# Patient Record
Sex: MALE | Race: BLACK OR AFRICAN AMERICAN | NOT HISPANIC OR LATINO | URBAN - METROPOLITAN AREA
[De-identification: names, ages, dates, MRNs, and addresses within clinical notes are randomized per-mention and may not be internally consistent; named-entity substitution may affect disease eponyms.]

---

## 2018-01-12 ASSESSMENT — MIFFLIN-ST. JEOR
SCORE: 1712.05
SCORE: 1683.02
SCORE: 1712.05
SCORE: 1683.02

## 2018-01-13 ENCOUNTER — SURGERY - HEALTHEAST (OUTPATIENT)
Dept: GASTROENTEROLOGY | Facility: CLINIC | Age: 63
End: 2018-01-13

## 2018-01-17 ENCOUNTER — HOME CARE/HOSPICE - HEALTHEAST (OUTPATIENT)
Dept: HOME HEALTH SERVICES | Facility: HOME HEALTH | Age: 63
End: 2018-01-17

## 2018-01-17 ASSESSMENT — MIFFLIN-ST. JEOR
SCORE: 1722.26
SCORE: 1722.26

## 2018-01-18 ASSESSMENT — MIFFLIN-ST. JEOR
SCORE: 1714.32
SCORE: 1714.32

## 2018-01-19 ASSESSMENT — MIFFLIN-ST. JEOR
SCORE: 1681.21
SCORE: 1681.21

## 2018-01-20 ASSESSMENT — MIFFLIN-ST. JEOR
SCORE: 1717.44
SCORE: 1717.44

## 2018-01-21 ASSESSMENT — MIFFLIN-ST. JEOR
SCORE: 1708.37
SCORE: 1708.37

## 2018-01-23 ASSESSMENT — MIFFLIN-ST. JEOR
SCORE: 1715.68
SCORE: 1715.68

## 2018-01-24 ASSESSMENT — MIFFLIN-ST. JEOR
SCORE: 1712.51
SCORE: 1712.51

## 2018-01-26 ASSESSMENT — MIFFLIN-ST. JEOR
SCORE: 1719.31
SCORE: 1719.31

## 2018-01-27 ASSESSMENT — MIFFLIN-ST. JEOR
SCORE: 1737.45
SCORE: 1737.45

## 2018-01-28 ASSESSMENT — MIFFLIN-ST. JEOR
SCORE: 1740.4
SCORE: 1740.4

## 2018-01-29 ASSESSMENT — MIFFLIN-ST. JEOR
SCORE: 1721.41
SCORE: 1721.41

## 2018-01-30 ENCOUNTER — ANESTHESIA - HEALTHEAST (OUTPATIENT)
Dept: SURGERY | Facility: CLINIC | Age: 63
End: 2018-01-30

## 2018-01-30 ENCOUNTER — SURGERY - HEALTHEAST (OUTPATIENT)
Dept: SURGERY | Facility: CLINIC | Age: 63
End: 2018-01-30

## 2018-01-30 ASSESSMENT — MIFFLIN-ST. JEOR
SCORE: 1749.19
SCORE: 1749.19

## 2018-01-31 ASSESSMENT — MIFFLIN-ST. JEOR
SCORE: 1780.38
SCORE: 1780.38

## 2018-02-01 ASSESSMENT — MIFFLIN-ST. JEOR
SCORE: 1776.46
SCORE: 1776.46

## 2018-02-02 ASSESSMENT — MIFFLIN-ST. JEOR
SCORE: 1737.45
SCORE: 1737.45

## 2018-02-03 ASSESSMENT — MIFFLIN-ST. JEOR
SCORE: 1735.3
SCORE: 1735.3

## 2018-02-04 ASSESSMENT — MIFFLIN-ST. JEOR
SCORE: 1736.15
SCORE: 1736.15

## 2018-02-05 ASSESSMENT — MIFFLIN-ST. JEOR
SCORE: 1731.33
SCORE: 1731.33

## 2018-02-06 ASSESSMENT — MIFFLIN-ST. JEOR
SCORE: 1709.33
SCORE: 1709.33

## 2018-02-08 ASSESSMENT — MIFFLIN-ST. JEOR
SCORE: 1700.71
SCORE: 1700.71

## 2018-02-09 ASSESSMENT — MIFFLIN-ST. JEOR
SCORE: 1702.07
SCORE: 1702.07

## 2018-02-10 ASSESSMENT — MIFFLIN-ST. JEOR
SCORE: 1699.8
SCORE: 1699.8

## 2018-02-12 ASSESSMENT — MIFFLIN-ST. JEOR
SCORE: 1690.28
SCORE: 1690.28

## 2018-02-13 ASSESSMENT — MIFFLIN-ST. JEOR
SCORE: 1662.72
SCORE: 1662.72

## 2018-04-25 ENCOUNTER — RECORDS - HEALTHEAST (OUTPATIENT)
Dept: ADMINISTRATIVE | Facility: OTHER | Age: 63
End: 2018-04-25

## 2018-05-03 ENCOUNTER — HOSPITAL ENCOUNTER (OUTPATIENT)
Dept: CT IMAGING | Facility: CLINIC | Age: 63
Discharge: HOME | End: 2018-05-03
Attending: INTERNAL MEDICINE

## 2018-05-03 DIAGNOSIS — R10.32 LLQ PAIN: ICD-10-CM

## 2018-05-03 LAB
CREAT BLD-MCNC: 0.9 MG/DL
POC GFR AMER AF HE - HISTORICAL: >60 ML/MIN/1.73M2
POC GFR NON AMER AF HE - HISTORICAL: >60 ML/MIN/1.73M2

## 2018-05-04 ENCOUNTER — RECORDS - HEALTHEAST (OUTPATIENT)
Dept: ADMINISTRATIVE | Facility: OTHER | Age: 63
End: 2018-05-04

## 2018-05-04 ENCOUNTER — HOSPITAL ENCOUNTER (OUTPATIENT)
Dept: MRI IMAGING | Facility: CLINIC | Age: 63
Discharge: HOME | End: 2018-05-04
Attending: INTERNAL MEDICINE

## 2018-05-04 DIAGNOSIS — K86.9 DISEASE OF PANCREAS, UNSPECIFIED: ICD-10-CM

## 2018-05-04 DIAGNOSIS — K86.89 MASS OF PANCREAS: ICD-10-CM

## 2018-06-04 ENCOUNTER — RECORDS - HEALTHEAST (OUTPATIENT)
Dept: ADMINISTRATIVE | Facility: OTHER | Age: 63
End: 2018-06-04

## 2018-06-14 ENCOUNTER — RECORDS - HEALTHEAST (OUTPATIENT)
Dept: ADMINISTRATIVE | Facility: OTHER | Age: 63
End: 2018-06-14

## 2018-06-22 ENCOUNTER — RECORDS - HEALTHEAST (OUTPATIENT)
Dept: ADMINISTRATIVE | Facility: OTHER | Age: 63
End: 2018-06-22

## 2018-07-09 ENCOUNTER — RECORDS - HEALTHEAST (OUTPATIENT)
Dept: ADMINISTRATIVE | Facility: OTHER | Age: 63
End: 2018-07-09

## 2018-07-11 ENCOUNTER — COMMUNICATION - HEALTHEAST (OUTPATIENT)
Dept: ONCOLOGY | Facility: HOSPITAL | Age: 63
End: 2018-07-11

## 2018-07-11 ENCOUNTER — AMBULATORY - HEALTHEAST (OUTPATIENT)
Dept: ONCOLOGY | Facility: HOSPITAL | Age: 63
End: 2018-07-11

## 2018-07-16 ENCOUNTER — COMMUNICATION - HEALTHEAST (OUTPATIENT)
Dept: ONCOLOGY | Facility: HOSPITAL | Age: 63
End: 2018-07-16

## 2018-07-18 ENCOUNTER — ANESTHESIA - HEALTHEAST (OUTPATIENT)
Dept: SURGERY | Facility: CLINIC | Age: 63
End: 2018-07-18

## 2018-07-18 ENCOUNTER — SURGERY - HEALTHEAST (OUTPATIENT)
Dept: SURGERY | Facility: CLINIC | Age: 63
End: 2018-07-18

## 2018-07-19 ENCOUNTER — SURGERY - HEALTHEAST (OUTPATIENT)
Dept: SURGERY | Facility: CLINIC | Age: 63
End: 2018-07-19

## 2018-07-19 ENCOUNTER — ANESTHESIA - HEALTHEAST (OUTPATIENT)
Dept: SURGERY | Facility: CLINIC | Age: 63
End: 2018-07-19

## 2018-07-20 ENCOUNTER — SURGERY - HEALTHEAST (OUTPATIENT)
Dept: SURGERY | Facility: CLINIC | Age: 63
End: 2018-07-20

## 2018-07-20 ENCOUNTER — ANESTHESIA - HEALTHEAST (OUTPATIENT)
Dept: SURGERY | Facility: CLINIC | Age: 63
End: 2018-07-20

## 2018-07-23 ENCOUNTER — INFUSION - HEALTHEAST (OUTPATIENT)
Dept: INFUSION THERAPY | Facility: CLINIC | Age: 63
End: 2018-07-23

## 2018-07-23 ENCOUNTER — COMMUNICATION - HEALTHEAST (OUTPATIENT)
Dept: ONCOLOGY | Facility: CLINIC | Age: 63
End: 2018-07-23

## 2018-07-23 DIAGNOSIS — C25.0 MALIGNANT NEOPLASM OF HEAD OF PANCREAS (H): ICD-10-CM

## 2018-07-24 ENCOUNTER — OFFICE VISIT - HEALTHEAST (OUTPATIENT)
Dept: ONCOLOGY | Facility: CLINIC | Age: 63
End: 2018-07-24

## 2018-07-24 ENCOUNTER — INFUSION - HEALTHEAST (OUTPATIENT)
Dept: INFUSION THERAPY | Facility: CLINIC | Age: 63
End: 2018-07-24

## 2018-07-24 ENCOUNTER — AMBULATORY - HEALTHEAST (OUTPATIENT)
Dept: INFUSION THERAPY | Facility: CLINIC | Age: 63
End: 2018-07-24

## 2018-07-24 DIAGNOSIS — C25.0 CA HEAD OF PANCREAS (H): ICD-10-CM

## 2018-07-24 DIAGNOSIS — Z51.11 ENCOUNTER FOR ANTINEOPLASTIC CHEMOTHERAPY: ICD-10-CM

## 2018-07-24 DIAGNOSIS — C25.0 MALIGNANT NEOPLASM OF HEAD OF PANCREAS (H): ICD-10-CM

## 2018-07-24 LAB
ALBUMIN SERPL-MCNC: 3.3 G/DL (ref 3.5–5)
ALP SERPL-CCNC: 227 U/L (ref 45–120)
ALT SERPL W P-5'-P-CCNC: 234 U/L (ref 0–45)
ANION GAP SERPL CALCULATED.3IONS-SCNC: 12 MMOL/L (ref 5–18)
AST SERPL W P-5'-P-CCNC: 54 U/L (ref 0–40)
BASOPHILS # BLD AUTO: 0 THOU/UL (ref 0–0.2)
BASOPHILS NFR BLD AUTO: 0 % (ref 0–2)
BILIRUB SERPL-MCNC: 3.2 MG/DL (ref 0–1)
BUN SERPL-MCNC: 9 MG/DL (ref 8–22)
CALCIUM SERPL-MCNC: 9.5 MG/DL (ref 8.5–10.5)
CHLORIDE BLD-SCNC: 104 MMOL/L (ref 98–107)
CO2 SERPL-SCNC: 25 MMOL/L (ref 22–31)
CREAT SERPL-MCNC: 0.84 MG/DL (ref 0.7–1.3)
EOSINOPHIL # BLD AUTO: 0.1 THOU/UL (ref 0–0.4)
EOSINOPHIL NFR BLD AUTO: 2 % (ref 0–6)
ERYTHROCYTE [DISTWIDTH] IN BLOOD BY AUTOMATED COUNT: 15.6 % (ref 11–14.5)
GFR SERPL CREATININE-BSD FRML MDRD: >60 ML/MIN/1.73M2
GLUCOSE BLD-MCNC: 283 MG/DL (ref 70–125)
HCT VFR BLD AUTO: 37.1 % (ref 40–54)
HGB BLD-MCNC: 12.6 G/DL (ref 14–18)
LYMPHOCYTES # BLD AUTO: 0.7 THOU/UL (ref 0.8–4.4)
LYMPHOCYTES NFR BLD AUTO: 14 % (ref 20–40)
MAGNESIUM SERPL-MCNC: 1.5 MG/DL (ref 1.8–2.6)
MCH RBC QN AUTO: 32.2 PG (ref 27–34)
MCHC RBC AUTO-ENTMCNC: 34 G/DL (ref 32–36)
MCV RBC AUTO: 95 FL (ref 80–100)
MONOCYTES # BLD AUTO: 0.6 THOU/UL (ref 0–0.9)
MONOCYTES NFR BLD AUTO: 12 % (ref 2–10)
NEUTROPHILS # BLD AUTO: 3.5 THOU/UL (ref 2–7.7)
NEUTROPHILS NFR BLD AUTO: 72 % (ref 50–70)
PLATELET # BLD AUTO: 289 THOU/UL (ref 140–440)
PMV BLD AUTO: 10 FL (ref 8.5–12.5)
POTASSIUM BLD-SCNC: 3.7 MMOL/L (ref 3.5–5)
PROT SERPL-MCNC: 6.6 G/DL (ref 6–8)
RBC # BLD AUTO: 3.91 MILL/UL (ref 4.4–6.2)
SODIUM SERPL-SCNC: 141 MMOL/L (ref 136–145)
WBC: 4.9 THOU/UL (ref 4–11)

## 2018-07-24 ASSESSMENT — MIFFLIN-ST. JEOR: SCORE: 1629.5

## 2018-07-25 ENCOUNTER — COMMUNICATION - HEALTHEAST (OUTPATIENT)
Dept: ONCOLOGY | Facility: HOSPITAL | Age: 63
End: 2018-07-25

## 2018-07-25 LAB — CANCER AG19-9 SERPL IA-ACNC: 690 U/ML (ref 0–37)

## 2018-07-26 ENCOUNTER — INFUSION - HEALTHEAST (OUTPATIENT)
Dept: INFUSION THERAPY | Facility: CLINIC | Age: 63
End: 2018-07-26

## 2018-07-26 DIAGNOSIS — C25.0 CA HEAD OF PANCREAS (H): ICD-10-CM

## 2018-07-26 DIAGNOSIS — C25.0 MALIGNANT NEOPLASM OF HEAD OF PANCREAS (H): ICD-10-CM

## 2018-07-26 DIAGNOSIS — Z51.11 ENCOUNTER FOR ANTINEOPLASTIC CHEMOTHERAPY: ICD-10-CM

## 2018-07-26 LAB
GENETICIST REVIEW: NORMAL
PROVIDER SIGNING NAME: NORMAL
REF LAB TEST METHOD: NORMAL
TEST PERFORMANCE INFO SPEC: NORMAL
UGT1A1 ALLELE GENO BLD/T: NORMAL
UGT1A1 GENE PROD MET ACT IMP BLD/T-IMP: NORMAL

## 2018-07-30 ENCOUNTER — COMMUNICATION - HEALTHEAST (OUTPATIENT)
Dept: ONCOLOGY | Facility: CLINIC | Age: 63
End: 2018-07-30

## 2018-07-30 ENCOUNTER — COMMUNICATION - HEALTHEAST (OUTPATIENT)
Dept: ONCOLOGY | Facility: HOSPITAL | Age: 63
End: 2018-07-30

## 2018-07-30 DIAGNOSIS — C25.0 CA HEAD OF PANCREAS (H): ICD-10-CM

## 2018-08-02 ENCOUNTER — COMMUNICATION - HEALTHEAST (OUTPATIENT)
Dept: ONCOLOGY | Facility: HOSPITAL | Age: 63
End: 2018-08-02

## 2018-08-06 ENCOUNTER — COMMUNICATION - HEALTHEAST (OUTPATIENT)
Dept: ONCOLOGY | Facility: CLINIC | Age: 63
End: 2018-08-06

## 2018-08-06 DIAGNOSIS — C25.0 MALIGNANT NEOPLASM OF HEAD OF PANCREAS (H): ICD-10-CM

## 2018-08-06 DIAGNOSIS — Z51.11 ENCOUNTER FOR ANTINEOPLASTIC CHEMOTHERAPY: ICD-10-CM

## 2018-08-06 DIAGNOSIS — C25.0 CA HEAD OF PANCREAS (H): ICD-10-CM

## 2018-08-08 ENCOUNTER — INFUSION - HEALTHEAST (OUTPATIENT)
Dept: INFUSION THERAPY | Facility: CLINIC | Age: 63
End: 2018-08-08

## 2018-08-08 ENCOUNTER — OFFICE VISIT - HEALTHEAST (OUTPATIENT)
Dept: ONCOLOGY | Facility: CLINIC | Age: 63
End: 2018-08-08

## 2018-08-08 ENCOUNTER — AMBULATORY - HEALTHEAST (OUTPATIENT)
Dept: INFUSION THERAPY | Facility: CLINIC | Age: 63
End: 2018-08-08

## 2018-08-08 DIAGNOSIS — Z51.11 ENCOUNTER FOR ANTINEOPLASTIC CHEMOTHERAPY: ICD-10-CM

## 2018-08-08 DIAGNOSIS — C25.0 MALIGNANT NEOPLASM OF HEAD OF PANCREAS (H): ICD-10-CM

## 2018-08-08 DIAGNOSIS — C25.0 CA HEAD OF PANCREAS (H): ICD-10-CM

## 2018-08-08 DIAGNOSIS — R73.9 HYPERGLYCEMIA: ICD-10-CM

## 2018-08-08 DIAGNOSIS — G89.3 CANCER ASSOCIATED PAIN: ICD-10-CM

## 2018-08-08 DIAGNOSIS — E87.0 HYPERNATREMIA: ICD-10-CM

## 2018-08-08 LAB
ALBUMIN SERPL-MCNC: 3.7 G/DL (ref 3.5–5)
ALP SERPL-CCNC: 171 U/L (ref 45–120)
ALT SERPL W P-5'-P-CCNC: 45 U/L (ref 0–45)
ANION GAP SERPL CALCULATED.3IONS-SCNC: 11 MMOL/L (ref 5–18)
AST SERPL W P-5'-P-CCNC: 15 U/L (ref 0–40)
BASOPHILS # BLD AUTO: 0 THOU/UL (ref 0–0.2)
BASOPHILS NFR BLD AUTO: 1 % (ref 0–2)
BILIRUB SERPL-MCNC: 1.3 MG/DL (ref 0–1)
BUN SERPL-MCNC: 6 MG/DL (ref 8–22)
CALCIUM SERPL-MCNC: 9.1 MG/DL (ref 8.5–10.5)
CHLORIDE BLD-SCNC: 102 MMOL/L (ref 98–107)
CO2 SERPL-SCNC: 22 MMOL/L (ref 22–31)
CREAT SERPL-MCNC: 0.96 MG/DL (ref 0.7–1.3)
EOSINOPHIL # BLD AUTO: 0.1 THOU/UL (ref 0–0.4)
EOSINOPHIL NFR BLD AUTO: 1 % (ref 0–6)
ERYTHROCYTE [DISTWIDTH] IN BLOOD BY AUTOMATED COUNT: 14.3 % (ref 11–14.5)
FASTING STATUS PATIENT QL REPORTED: NO
GFR SERPL CREATININE-BSD FRML MDRD: >60 ML/MIN/1.73M2
GLUCOSE BLD-MCNC: 354 MG/DL (ref 70–125)
GLUCOSE BLD-MCNC: 484 MG/DL (ref 70–125)
HCT VFR BLD AUTO: 38.4 % (ref 40–54)
HGB BLD-MCNC: 13.1 G/DL (ref 14–18)
LYMPHOCYTES # BLD AUTO: 1.4 THOU/UL (ref 0.8–4.4)
LYMPHOCYTES NFR BLD AUTO: 19 % (ref 20–40)
MAGNESIUM SERPL-MCNC: 1.9 MG/DL (ref 1.8–2.6)
MCH RBC QN AUTO: 32.1 PG (ref 27–34)
MCHC RBC AUTO-ENTMCNC: 34.1 G/DL (ref 32–36)
MCV RBC AUTO: 94 FL (ref 80–100)
MONOCYTES # BLD AUTO: 0.7 THOU/UL (ref 0–0.9)
MONOCYTES NFR BLD AUTO: 9 % (ref 2–10)
NEUTROPHILS # BLD AUTO: 5.3 THOU/UL (ref 2–7.7)
NEUTROPHILS NFR BLD AUTO: 71 % (ref 50–70)
PLATELET # BLD AUTO: 158 THOU/UL (ref 140–440)
PMV BLD AUTO: 10.3 FL (ref 8.5–12.5)
POTASSIUM BLD-SCNC: 4.3 MMOL/L (ref 3.5–5)
PROT SERPL-MCNC: 6.6 G/DL (ref 6–8)
RBC # BLD AUTO: 4.08 MILL/UL (ref 4.4–6.2)
SODIUM SERPL-SCNC: 135 MMOL/L (ref 136–145)
WBC: 7.6 THOU/UL (ref 4–11)

## 2018-08-08 ASSESSMENT — MIFFLIN-ST. JEOR: SCORE: 1576.43

## 2018-08-09 ENCOUNTER — COMMUNICATION - HEALTHEAST (OUTPATIENT)
Dept: ONCOLOGY | Facility: HOSPITAL | Age: 63
End: 2018-08-09

## 2018-08-10 ENCOUNTER — INFUSION - HEALTHEAST (OUTPATIENT)
Dept: INFUSION THERAPY | Facility: CLINIC | Age: 63
End: 2018-08-10

## 2018-08-10 DIAGNOSIS — C25.0 MALIGNANT NEOPLASM OF HEAD OF PANCREAS (H): ICD-10-CM

## 2018-08-10 DIAGNOSIS — Z51.11 ENCOUNTER FOR ANTINEOPLASTIC CHEMOTHERAPY: ICD-10-CM

## 2018-08-10 DIAGNOSIS — C25.0 CA HEAD OF PANCREAS (H): ICD-10-CM

## 2018-08-21 ENCOUNTER — INFUSION - HEALTHEAST (OUTPATIENT)
Dept: INFUSION THERAPY | Facility: CLINIC | Age: 63
End: 2018-08-21

## 2018-08-21 ENCOUNTER — AMBULATORY - HEALTHEAST (OUTPATIENT)
Dept: INFUSION THERAPY | Facility: CLINIC | Age: 63
End: 2018-08-21

## 2018-08-21 ENCOUNTER — OFFICE VISIT - HEALTHEAST (OUTPATIENT)
Dept: ONCOLOGY | Facility: CLINIC | Age: 63
End: 2018-08-21

## 2018-08-21 DIAGNOSIS — M21.372 FOOT DROP, LEFT: ICD-10-CM

## 2018-08-21 DIAGNOSIS — Z51.11 ENCOUNTER FOR ANTINEOPLASTIC CHEMOTHERAPY: ICD-10-CM

## 2018-08-21 DIAGNOSIS — C25.0 MALIGNANT NEOPLASM OF HEAD OF PANCREAS (H): ICD-10-CM

## 2018-08-21 DIAGNOSIS — C25.0 CA HEAD OF PANCREAS (H): ICD-10-CM

## 2018-08-21 LAB
ALBUMIN SERPL-MCNC: 3.4 G/DL (ref 3.5–5)
ALP SERPL-CCNC: 163 U/L (ref 45–120)
ALT SERPL W P-5'-P-CCNC: 35 U/L (ref 0–45)
ANION GAP SERPL CALCULATED.3IONS-SCNC: 9 MMOL/L (ref 5–18)
AST SERPL W P-5'-P-CCNC: 12 U/L (ref 0–40)
BASOPHILS # BLD AUTO: 0 THOU/UL (ref 0–0.2)
BASOPHILS NFR BLD AUTO: 0 % (ref 0–2)
BILIRUB SERPL-MCNC: 0.6 MG/DL (ref 0–1)
BUN SERPL-MCNC: 10 MG/DL (ref 8–22)
CALCIUM SERPL-MCNC: 8.7 MG/DL (ref 8.5–10.5)
CHLORIDE BLD-SCNC: 108 MMOL/L (ref 98–107)
CO2 SERPL-SCNC: 21 MMOL/L (ref 22–31)
CREAT SERPL-MCNC: 0.93 MG/DL (ref 0.7–1.3)
EOSINOPHIL COUNT (ABSOLUTE): 0 THOU/UL (ref 0–0.4)
EOSINOPHIL NFR BLD AUTO: 0 % (ref 0–6)
ERYTHROCYTE [DISTWIDTH] IN BLOOD BY AUTOMATED COUNT: 14.9 % (ref 11–14.5)
GFR SERPL CREATININE-BSD FRML MDRD: >60 ML/MIN/1.73M2
GLUCOSE BLD-MCNC: 295 MG/DL (ref 70–125)
HCT VFR BLD AUTO: 33.7 % (ref 40–54)
HGB BLD-MCNC: 11.7 G/DL (ref 14–18)
LYMPHOCYTES # BLD AUTO: 1.3 THOU/UL (ref 0.8–4.4)
LYMPHOCYTES NFR BLD AUTO: 12 % (ref 20–40)
MAGNESIUM SERPL-MCNC: 1.3 MG/DL (ref 1.8–2.6)
MCH RBC QN AUTO: 32.9 PG (ref 27–34)
MCHC RBC AUTO-ENTMCNC: 34.7 G/DL (ref 32–36)
MCV RBC AUTO: 95 FL (ref 80–100)
MONOCYTES # BLD AUTO: 0.3 THOU/UL (ref 0–0.9)
MONOCYTES NFR BLD AUTO: 3 % (ref 2–10)
PLAT MORPH BLD: ABNORMAL
PLATELET # BLD AUTO: 136 THOU/UL (ref 140–440)
PMV BLD AUTO: 9.3 FL (ref 8.5–12.5)
POTASSIUM BLD-SCNC: 4.4 MMOL/L (ref 3.5–5)
PROT SERPL-MCNC: 5.6 G/DL (ref 6–8)
RBC # BLD AUTO: 3.56 MILL/UL (ref 4.4–6.2)
SODIUM SERPL-SCNC: 138 MMOL/L (ref 136–145)
TOTAL NEUTROPHILS-ABS(DIFF): 9.5 THOU/UL (ref 2–7.7)
TOTAL NEUTROPHILS-REL(DIFF): 85 % (ref 50–70)
WBC: 11.2 THOU/UL (ref 4–11)

## 2018-08-21 RX ORDER — TRIAMCINOLONE ACETONIDE 1 MG/G
1 CREAM TOPICAL PRN
Status: SHIPPED | COMMUNITY
Start: 2018-06-04

## 2018-08-21 ASSESSMENT — MIFFLIN-ST. JEOR: SCORE: 1573.25

## 2018-08-23 ENCOUNTER — INFUSION - HEALTHEAST (OUTPATIENT)
Dept: INFUSION THERAPY | Facility: CLINIC | Age: 63
End: 2018-08-23

## 2018-08-23 DIAGNOSIS — C25.0 MALIGNANT NEOPLASM OF HEAD OF PANCREAS (H): ICD-10-CM

## 2018-08-23 DIAGNOSIS — C25.0 CA HEAD OF PANCREAS (H): ICD-10-CM

## 2018-08-23 DIAGNOSIS — Z51.11 ENCOUNTER FOR ANTINEOPLASTIC CHEMOTHERAPY: ICD-10-CM

## 2018-08-24 ENCOUNTER — COMMUNICATION - HEALTHEAST (OUTPATIENT)
Dept: ONCOLOGY | Facility: CLINIC | Age: 63
End: 2018-08-24

## 2018-08-24 ENCOUNTER — INFUSION - HEALTHEAST (OUTPATIENT)
Dept: INFUSION THERAPY | Facility: CLINIC | Age: 63
End: 2018-08-24

## 2018-08-24 DIAGNOSIS — C25.0 CA HEAD OF PANCREAS (H): ICD-10-CM

## 2018-08-24 DIAGNOSIS — Z51.11 ENCOUNTER FOR ANTINEOPLASTIC CHEMOTHERAPY: ICD-10-CM

## 2018-08-24 DIAGNOSIS — C25.0 MALIGNANT NEOPLASM OF HEAD OF PANCREAS (H): ICD-10-CM

## 2018-08-25 ENCOUNTER — AMBULATORY - HEALTHEAST (OUTPATIENT)
Dept: ONCOLOGY | Facility: CLINIC | Age: 63
End: 2018-08-25

## 2018-08-30 ENCOUNTER — HOSPITAL ENCOUNTER (OUTPATIENT)
Dept: MRI IMAGING | Facility: CLINIC | Age: 63
Setting detail: RADIATION/ONCOLOGY SERIES
Discharge: STILL ADMITTED OR ADMINISTRATIVE | End: 2018-08-30
Attending: INTERNAL MEDICINE

## 2018-08-30 DIAGNOSIS — M21.372 FOOT DROP, LEFT: ICD-10-CM

## 2018-08-31 ENCOUNTER — COMMUNICATION - HEALTHEAST (OUTPATIENT)
Dept: ONCOLOGY | Facility: HOSPITAL | Age: 63
End: 2018-08-31

## 2018-09-04 ENCOUNTER — INFUSION - HEALTHEAST (OUTPATIENT)
Dept: INFUSION THERAPY | Facility: CLINIC | Age: 63
End: 2018-09-04

## 2018-09-04 ENCOUNTER — AMBULATORY - HEALTHEAST (OUTPATIENT)
Dept: ONCOLOGY | Facility: CLINIC | Age: 63
End: 2018-09-04

## 2018-09-04 ENCOUNTER — OFFICE VISIT - HEALTHEAST (OUTPATIENT)
Dept: ONCOLOGY | Facility: CLINIC | Age: 63
End: 2018-09-04

## 2018-09-04 ENCOUNTER — AMBULATORY - HEALTHEAST (OUTPATIENT)
Dept: INFUSION THERAPY | Facility: CLINIC | Age: 63
End: 2018-09-04

## 2018-09-04 DIAGNOSIS — C25.0 CA HEAD OF PANCREAS (H): ICD-10-CM

## 2018-09-04 DIAGNOSIS — Z51.11 ENCOUNTER FOR ANTINEOPLASTIC CHEMOTHERAPY: ICD-10-CM

## 2018-09-04 DIAGNOSIS — E66.9 DIABETES MELLITUS TYPE 2 IN OBESE: ICD-10-CM

## 2018-09-04 DIAGNOSIS — K83.1 OBSTRUCTION OF BILE DUCT (H): ICD-10-CM

## 2018-09-04 DIAGNOSIS — G89.3 CANCER ASSOCIATED PAIN: ICD-10-CM

## 2018-09-04 DIAGNOSIS — C25.0 MALIGNANT NEOPLASM OF HEAD OF PANCREAS (H): ICD-10-CM

## 2018-09-04 DIAGNOSIS — E11.69 DIABETES MELLITUS TYPE 2 IN OBESE: ICD-10-CM

## 2018-09-04 DIAGNOSIS — C25.9 PANCREATIC CANCER (H): ICD-10-CM

## 2018-09-04 LAB
ALBUMIN SERPL-MCNC: 3.6 G/DL (ref 3.5–5)
ALP SERPL-CCNC: 176 U/L (ref 45–120)
ALT SERPL W P-5'-P-CCNC: 33 U/L (ref 0–45)
ANION GAP SERPL CALCULATED.3IONS-SCNC: 8 MMOL/L (ref 5–18)
AST SERPL W P-5'-P-CCNC: 11 U/L (ref 0–40)
BASOPHILS # BLD AUTO: 0 THOU/UL (ref 0–0.2)
BASOPHILS NFR BLD AUTO: 0 % (ref 0–2)
BILIRUB SERPL-MCNC: 0.5 MG/DL (ref 0–1)
BUN SERPL-MCNC: 10 MG/DL (ref 8–22)
CALCIUM SERPL-MCNC: 9.4 MG/DL (ref 8.5–10.5)
CHLORIDE BLD-SCNC: 104 MMOL/L (ref 98–107)
CO2 SERPL-SCNC: 25 MMOL/L (ref 22–31)
CREAT SERPL-MCNC: 0.76 MG/DL (ref 0.7–1.3)
EOSINOPHIL # BLD AUTO: 0 THOU/UL (ref 0–0.4)
EOSINOPHIL NFR BLD AUTO: 0 % (ref 0–6)
ERYTHROCYTE [DISTWIDTH] IN BLOOD BY AUTOMATED COUNT: 15.4 % (ref 11–14.5)
GFR SERPL CREATININE-BSD FRML MDRD: >60 ML/MIN/1.73M2
GLUCOSE BLD-MCNC: 167 MG/DL (ref 70–125)
HCT VFR BLD AUTO: 37.3 % (ref 40–54)
HGB BLD-MCNC: 12.7 G/DL (ref 14–18)
LYMPHOCYTES # BLD AUTO: 2 THOU/UL (ref 0.8–4.4)
LYMPHOCYTES NFR BLD AUTO: 21 % (ref 20–40)
MAGNESIUM SERPL-MCNC: 1.9 MG/DL (ref 1.8–2.6)
MCH RBC QN AUTO: 32.9 PG (ref 27–34)
MCHC RBC AUTO-ENTMCNC: 34 G/DL (ref 32–36)
MCV RBC AUTO: 97 FL (ref 80–100)
MONOCYTES # BLD AUTO: 1 THOU/UL (ref 0–0.9)
MONOCYTES NFR BLD AUTO: 11 % (ref 2–10)
NEUTROPHILS # BLD AUTO: 6.2 THOU/UL (ref 2–7.7)
NEUTROPHILS NFR BLD AUTO: 67 % (ref 50–70)
PLATELET # BLD AUTO: 169 THOU/UL (ref 140–440)
PMV BLD AUTO: 9.4 FL (ref 8.5–12.5)
POTASSIUM BLD-SCNC: 4.2 MMOL/L (ref 3.5–5)
PROT SERPL-MCNC: 6.2 G/DL (ref 6–8)
RBC # BLD AUTO: 3.86 MILL/UL (ref 4.4–6.2)
SODIUM SERPL-SCNC: 137 MMOL/L (ref 136–145)
WBC: 9.3 THOU/UL (ref 4–11)

## 2018-09-04 ASSESSMENT — MIFFLIN-ST. JEOR: SCORE: 1531.52

## 2018-09-05 ENCOUNTER — RECORDS - HEALTHEAST (OUTPATIENT)
Dept: ADMINISTRATIVE | Facility: OTHER | Age: 63
End: 2018-09-05

## 2018-09-06 ENCOUNTER — INFUSION - HEALTHEAST (OUTPATIENT)
Dept: INFUSION THERAPY | Facility: CLINIC | Age: 63
End: 2018-09-06

## 2018-09-06 DIAGNOSIS — C25.0 MALIGNANT NEOPLASM OF HEAD OF PANCREAS (H): ICD-10-CM

## 2018-09-06 DIAGNOSIS — Z51.11 ENCOUNTER FOR ANTINEOPLASTIC CHEMOTHERAPY: ICD-10-CM

## 2018-09-06 DIAGNOSIS — C25.0 CA HEAD OF PANCREAS (H): ICD-10-CM

## 2018-09-18 ENCOUNTER — AMBULATORY - HEALTHEAST (OUTPATIENT)
Dept: ONCOLOGY | Facility: CLINIC | Age: 63
End: 2018-09-18

## 2018-09-19 ENCOUNTER — INFUSION - HEALTHEAST (OUTPATIENT)
Dept: INFUSION THERAPY | Facility: CLINIC | Age: 63
End: 2018-09-19

## 2018-09-19 ENCOUNTER — OFFICE VISIT - HEALTHEAST (OUTPATIENT)
Dept: ONCOLOGY | Facility: CLINIC | Age: 63
End: 2018-09-19

## 2018-09-19 ENCOUNTER — AMBULATORY - HEALTHEAST (OUTPATIENT)
Dept: INFUSION THERAPY | Facility: CLINIC | Age: 63
End: 2018-09-19

## 2018-09-19 DIAGNOSIS — C25.0 CA HEAD OF PANCREAS (H): ICD-10-CM

## 2018-09-19 DIAGNOSIS — Z51.11 ENCOUNTER FOR ANTINEOPLASTIC CHEMOTHERAPY: ICD-10-CM

## 2018-09-19 DIAGNOSIS — C25.0 MALIGNANT NEOPLASM OF HEAD OF PANCREAS (H): ICD-10-CM

## 2018-09-19 LAB
ALBUMIN SERPL-MCNC: 3.4 G/DL (ref 3.5–5)
ALP SERPL-CCNC: 172 U/L (ref 45–120)
ALT SERPL W P-5'-P-CCNC: 25 U/L (ref 0–45)
ANION GAP SERPL CALCULATED.3IONS-SCNC: 13 MMOL/L (ref 5–18)
AST SERPL W P-5'-P-CCNC: 13 U/L (ref 0–40)
BASOPHILS # BLD AUTO: 0 THOU/UL (ref 0–0.2)
BASOPHILS NFR BLD AUTO: 0 % (ref 0–2)
BILIRUB SERPL-MCNC: 0.4 MG/DL (ref 0–1)
BUN SERPL-MCNC: 8 MG/DL (ref 8–22)
CALCIUM SERPL-MCNC: 9.3 MG/DL (ref 8.5–10.5)
CHLORIDE BLD-SCNC: 107 MMOL/L (ref 98–107)
CO2 SERPL-SCNC: 22 MMOL/L (ref 22–31)
CREAT SERPL-MCNC: 0.8 MG/DL (ref 0.7–1.3)
EOSINOPHIL # BLD AUTO: 0 THOU/UL (ref 0–0.4)
EOSINOPHIL NFR BLD AUTO: 0 % (ref 0–6)
ERYTHROCYTE [DISTWIDTH] IN BLOOD BY AUTOMATED COUNT: 15.1 % (ref 11–14.5)
GFR SERPL CREATININE-BSD FRML MDRD: >60 ML/MIN/1.73M2
GLUCOSE BLD-MCNC: 162 MG/DL (ref 70–125)
HCT VFR BLD AUTO: 38.3 % (ref 40–54)
HGB BLD-MCNC: 12.6 G/DL (ref 14–18)
LYMPHOCYTES # BLD AUTO: 1.6 THOU/UL (ref 0.8–4.4)
LYMPHOCYTES NFR BLD AUTO: 17 % (ref 20–40)
MAGNESIUM SERPL-MCNC: 1.8 MG/DL (ref 1.8–2.6)
MCH RBC QN AUTO: 32.6 PG (ref 27–34)
MCHC RBC AUTO-ENTMCNC: 32.9 G/DL (ref 32–36)
MCV RBC AUTO: 99 FL (ref 80–100)
MONOCYTES # BLD AUTO: 1 THOU/UL (ref 0–0.9)
MONOCYTES NFR BLD AUTO: 11 % (ref 2–10)
NEUTROPHILS # BLD AUTO: 6.5 THOU/UL (ref 2–7.7)
NEUTROPHILS NFR BLD AUTO: 72 % (ref 50–70)
PLATELET # BLD AUTO: 150 THOU/UL (ref 140–440)
PMV BLD AUTO: 9.8 FL (ref 8.5–12.5)
POTASSIUM BLD-SCNC: 4.2 MMOL/L (ref 3.5–5)
PROT SERPL-MCNC: 5.8 G/DL (ref 6–8)
RBC # BLD AUTO: 3.86 MILL/UL (ref 4.4–6.2)
SODIUM SERPL-SCNC: 142 MMOL/L (ref 136–145)
WBC: 9.2 THOU/UL (ref 4–11)

## 2018-09-19 ASSESSMENT — MIFFLIN-ST. JEOR: SCORE: 1541.95

## 2018-09-21 ENCOUNTER — INFUSION - HEALTHEAST (OUTPATIENT)
Dept: INFUSION THERAPY | Facility: CLINIC | Age: 63
End: 2018-09-21

## 2018-09-21 DIAGNOSIS — C25.0 CA HEAD OF PANCREAS (H): ICD-10-CM

## 2018-09-21 DIAGNOSIS — Z51.11 ENCOUNTER FOR ANTINEOPLASTIC CHEMOTHERAPY: ICD-10-CM

## 2018-09-21 DIAGNOSIS — C25.0 MALIGNANT NEOPLASM OF HEAD OF PANCREAS (H): ICD-10-CM

## 2018-10-03 ENCOUNTER — RECORDS - HEALTHEAST (OUTPATIENT)
Dept: ADMINISTRATIVE | Facility: OTHER | Age: 63
End: 2018-10-03

## 2018-10-03 ENCOUNTER — INFUSION - HEALTHEAST (OUTPATIENT)
Dept: INFUSION THERAPY | Facility: CLINIC | Age: 63
End: 2018-10-03

## 2018-10-03 ENCOUNTER — AMBULATORY - HEALTHEAST (OUTPATIENT)
Dept: INFUSION THERAPY | Facility: CLINIC | Age: 63
End: 2018-10-03

## 2018-10-03 ENCOUNTER — OFFICE VISIT - HEALTHEAST (OUTPATIENT)
Dept: ONCOLOGY | Facility: CLINIC | Age: 63
End: 2018-10-03

## 2018-10-03 DIAGNOSIS — C25.0 MALIGNANT NEOPLASM OF HEAD OF PANCREAS (H): ICD-10-CM

## 2018-10-03 DIAGNOSIS — Z51.11 ENCOUNTER FOR ANTINEOPLASTIC CHEMOTHERAPY: ICD-10-CM

## 2018-10-03 DIAGNOSIS — C25.0 CA HEAD OF PANCREAS (H): ICD-10-CM

## 2018-10-03 DIAGNOSIS — E11.8 TYPE 2 DIABETES MELLITUS WITH COMPLICATION, WITHOUT LONG-TERM CURRENT USE OF INSULIN (H): ICD-10-CM

## 2018-10-03 DIAGNOSIS — G89.3 CANCER ASSOCIATED PAIN: ICD-10-CM

## 2018-10-03 DIAGNOSIS — C25.9 PANCREATIC CANCER (H): ICD-10-CM

## 2018-10-03 LAB
ALBUMIN SERPL-MCNC: 3.6 G/DL (ref 3.5–5)
ALP SERPL-CCNC: 191 U/L (ref 45–120)
ALT SERPL W P-5'-P-CCNC: 21 U/L (ref 0–45)
ANION GAP SERPL CALCULATED.3IONS-SCNC: 9 MMOL/L (ref 5–18)
AST SERPL W P-5'-P-CCNC: 13 U/L (ref 0–40)
BASOPHILS # BLD AUTO: 0 THOU/UL (ref 0–0.2)
BASOPHILS NFR BLD AUTO: 0 % (ref 0–2)
BILIRUB SERPL-MCNC: 0.4 MG/DL (ref 0–1)
BUN SERPL-MCNC: 12 MG/DL (ref 8–22)
CALCIUM SERPL-MCNC: 9.2 MG/DL (ref 8.5–10.5)
CHLORIDE BLD-SCNC: 107 MMOL/L (ref 98–107)
CO2 SERPL-SCNC: 23 MMOL/L (ref 22–31)
CREAT SERPL-MCNC: 0.84 MG/DL (ref 0.7–1.3)
EOSINOPHIL # BLD AUTO: 0 THOU/UL (ref 0–0.4)
EOSINOPHIL NFR BLD AUTO: 0 % (ref 0–6)
ERYTHROCYTE [DISTWIDTH] IN BLOOD BY AUTOMATED COUNT: 15.3 % (ref 11–14.5)
GFR SERPL CREATININE-BSD FRML MDRD: >60 ML/MIN/1.73M2
GLUCOSE BLD-MCNC: 158 MG/DL (ref 70–125)
HCT VFR BLD AUTO: 38.4 % (ref 40–54)
HGB BLD-MCNC: 12.5 G/DL (ref 14–18)
LYMPHOCYTES # BLD AUTO: 1.6 THOU/UL (ref 0.8–4.4)
LYMPHOCYTES NFR BLD AUTO: 13 % (ref 20–40)
MAGNESIUM SERPL-MCNC: 2 MG/DL (ref 1.8–2.6)
MCH RBC QN AUTO: 33 PG (ref 27–34)
MCHC RBC AUTO-ENTMCNC: 32.6 G/DL (ref 32–36)
MCV RBC AUTO: 101 FL (ref 80–100)
MONOCYTES # BLD AUTO: 1.2 THOU/UL (ref 0–0.9)
MONOCYTES NFR BLD AUTO: 10 % (ref 2–10)
NEUTROPHILS # BLD AUTO: 9.4 THOU/UL (ref 2–7.7)
NEUTROPHILS NFR BLD AUTO: 77 % (ref 50–70)
PLATELET # BLD AUTO: 141 THOU/UL (ref 140–440)
PMV BLD AUTO: 10.1 FL (ref 8.5–12.5)
POTASSIUM BLD-SCNC: 4.3 MMOL/L (ref 3.5–5)
PROT SERPL-MCNC: 6.1 G/DL (ref 6–8)
RBC # BLD AUTO: 3.79 MILL/UL (ref 4.4–6.2)
SODIUM SERPL-SCNC: 139 MMOL/L (ref 136–145)
WBC: 12.3 THOU/UL (ref 4–11)

## 2018-10-03 ASSESSMENT — MIFFLIN-ST. JEOR: SCORE: 1524.26

## 2018-10-05 ENCOUNTER — INFUSION - HEALTHEAST (OUTPATIENT)
Dept: INFUSION THERAPY | Facility: CLINIC | Age: 63
End: 2018-10-05

## 2018-10-05 DIAGNOSIS — Z51.11 ENCOUNTER FOR ANTINEOPLASTIC CHEMOTHERAPY: ICD-10-CM

## 2018-10-05 DIAGNOSIS — C25.0 MALIGNANT NEOPLASM OF HEAD OF PANCREAS (H): ICD-10-CM

## 2018-10-05 DIAGNOSIS — C25.0 CA HEAD OF PANCREAS (H): ICD-10-CM

## 2018-10-09 ENCOUNTER — AMBULATORY - HEALTHEAST (OUTPATIENT)
Dept: ONCOLOGY | Facility: CLINIC | Age: 63
End: 2018-10-09

## 2018-10-09 DIAGNOSIS — C25.0 MALIGNANT NEOPLASM OF HEAD OF PANCREAS (H): ICD-10-CM

## 2018-10-10 ENCOUNTER — AMBULATORY - HEALTHEAST (OUTPATIENT)
Dept: ONCOLOGY | Facility: CLINIC | Age: 63
End: 2018-10-10

## 2018-10-10 DIAGNOSIS — C25.0 CA HEAD OF PANCREAS (H): ICD-10-CM

## 2018-10-10 DIAGNOSIS — C25.0 MALIGNANT NEOPLASM OF HEAD OF PANCREAS (H): ICD-10-CM

## 2018-10-11 ENCOUNTER — COMMUNICATION - HEALTHEAST (OUTPATIENT)
Dept: ADMINISTRATIVE | Facility: HOSPITAL | Age: 63
End: 2018-10-11

## 2018-10-11 ENCOUNTER — HOSPITAL ENCOUNTER (OUTPATIENT)
Dept: CT IMAGING | Facility: HOSPITAL | Age: 63
Setting detail: RADIATION/ONCOLOGY SERIES
Discharge: STILL ADMITTED OR ADMINISTRATIVE | End: 2018-10-11
Attending: INTERNAL MEDICINE

## 2018-10-11 ENCOUNTER — COMMUNICATION - HEALTHEAST (OUTPATIENT)
Dept: ONCOLOGY | Facility: CLINIC | Age: 63
End: 2018-10-11

## 2018-10-11 DIAGNOSIS — C25.0 MALIGNANT NEOPLASM OF HEAD OF PANCREAS (H): ICD-10-CM

## 2018-10-16 ENCOUNTER — OFFICE VISIT - HEALTHEAST (OUTPATIENT)
Dept: ONCOLOGY | Facility: CLINIC | Age: 63
End: 2018-10-16

## 2018-10-16 ENCOUNTER — AMBULATORY - HEALTHEAST (OUTPATIENT)
Dept: INFUSION THERAPY | Facility: CLINIC | Age: 63
End: 2018-10-16

## 2018-10-16 DIAGNOSIS — C25.0 MALIGNANT NEOPLASM OF HEAD OF PANCREAS (H): ICD-10-CM

## 2018-10-16 DIAGNOSIS — Z51.11 ENCOUNTER FOR ANTINEOPLASTIC CHEMOTHERAPY: ICD-10-CM

## 2018-10-16 DIAGNOSIS — C25.0 CA HEAD OF PANCREAS (H): ICD-10-CM

## 2018-10-16 LAB
ALBUMIN SERPL-MCNC: 3.3 G/DL (ref 3.5–5)
ALP SERPL-CCNC: 146 U/L (ref 45–120)
ALT SERPL W P-5'-P-CCNC: 25 U/L (ref 0–45)
ANION GAP SERPL CALCULATED.3IONS-SCNC: 10 MMOL/L (ref 5–18)
AST SERPL W P-5'-P-CCNC: 14 U/L (ref 0–40)
BASOPHILS # BLD AUTO: 0 THOU/UL (ref 0–0.2)
BASOPHILS NFR BLD AUTO: 0 % (ref 0–2)
BILIRUB SERPL-MCNC: 0.3 MG/DL (ref 0–1)
BUN SERPL-MCNC: 9 MG/DL (ref 8–22)
CALCIUM SERPL-MCNC: 8.5 MG/DL (ref 8.5–10.5)
CHLORIDE BLD-SCNC: 109 MMOL/L (ref 98–107)
CO2 SERPL-SCNC: 21 MMOL/L (ref 22–31)
CREAT SERPL-MCNC: 0.73 MG/DL (ref 0.7–1.3)
EOSINOPHIL # BLD AUTO: 0 THOU/UL (ref 0–0.4)
EOSINOPHIL NFR BLD AUTO: 0 % (ref 0–6)
ERYTHROCYTE [DISTWIDTH] IN BLOOD BY AUTOMATED COUNT: 15.8 % (ref 11–14.5)
GFR SERPL CREATININE-BSD FRML MDRD: >60 ML/MIN/1.73M2
GLUCOSE BLD-MCNC: 177 MG/DL (ref 70–125)
HCT VFR BLD AUTO: 33.8 % (ref 40–54)
HGB BLD-MCNC: 11.2 G/DL (ref 14–18)
LYMPHOCYTES # BLD AUTO: 1.5 THOU/UL (ref 0.8–4.4)
LYMPHOCYTES NFR BLD AUTO: 14 % (ref 20–40)
MAGNESIUM SERPL-MCNC: 1.9 MG/DL (ref 1.8–2.6)
MCH RBC QN AUTO: 33.5 PG (ref 27–34)
MCHC RBC AUTO-ENTMCNC: 33.1 G/DL (ref 32–36)
MCV RBC AUTO: 101 FL (ref 80–100)
MONOCYTES # BLD AUTO: 1.3 THOU/UL (ref 0–0.9)
MONOCYTES NFR BLD AUTO: 12 % (ref 2–10)
NEUTROPHILS # BLD AUTO: 7.5 THOU/UL (ref 2–7.7)
NEUTROPHILS NFR BLD AUTO: 73 % (ref 50–70)
PLATELET # BLD AUTO: 139 THOU/UL (ref 140–440)
PMV BLD AUTO: 9.7 FL (ref 8.5–12.5)
POTASSIUM BLD-SCNC: 3.4 MMOL/L (ref 3.5–5)
PROT SERPL-MCNC: 5.5 G/DL (ref 6–8)
RBC # BLD AUTO: 3.34 MILL/UL (ref 4.4–6.2)
SODIUM SERPL-SCNC: 140 MMOL/L (ref 136–145)
WBC: 10.4 THOU/UL (ref 4–11)

## 2018-10-16 ASSESSMENT — MIFFLIN-ST. JEOR: SCORE: 1541.05

## 2018-10-17 ENCOUNTER — INFUSION - HEALTHEAST (OUTPATIENT)
Dept: INFUSION THERAPY | Facility: CLINIC | Age: 63
End: 2018-10-17

## 2018-10-17 DIAGNOSIS — C25.0 MALIGNANT NEOPLASM OF HEAD OF PANCREAS (H): ICD-10-CM

## 2018-10-17 DIAGNOSIS — C25.0 CA HEAD OF PANCREAS (H): ICD-10-CM

## 2018-10-17 DIAGNOSIS — Z51.11 ENCOUNTER FOR ANTINEOPLASTIC CHEMOTHERAPY: ICD-10-CM

## 2018-10-19 ENCOUNTER — COMMUNICATION - HEALTHEAST (OUTPATIENT)
Dept: ONCOLOGY | Facility: CLINIC | Age: 63
End: 2018-10-19

## 2018-10-19 ENCOUNTER — INFUSION - HEALTHEAST (OUTPATIENT)
Dept: INFUSION THERAPY | Facility: CLINIC | Age: 63
End: 2018-10-19

## 2018-10-19 DIAGNOSIS — Z51.11 ENCOUNTER FOR ANTINEOPLASTIC CHEMOTHERAPY: ICD-10-CM

## 2018-10-19 DIAGNOSIS — C25.0 MALIGNANT NEOPLASM OF HEAD OF PANCREAS (H): ICD-10-CM

## 2018-10-19 DIAGNOSIS — C25.0 CA HEAD OF PANCREAS (H): ICD-10-CM

## 2018-10-31 ENCOUNTER — COMMUNICATION - HEALTHEAST (OUTPATIENT)
Dept: ONCOLOGY | Facility: HOSPITAL | Age: 63
End: 2018-10-31

## 2018-10-31 ENCOUNTER — COMMUNICATION - HEALTHEAST (OUTPATIENT)
Dept: ONCOLOGY | Facility: CLINIC | Age: 63
End: 2018-10-31

## 2018-10-31 ENCOUNTER — INFUSION - HEALTHEAST (OUTPATIENT)
Dept: INFUSION THERAPY | Facility: CLINIC | Age: 63
End: 2018-10-31

## 2018-10-31 ENCOUNTER — OFFICE VISIT - HEALTHEAST (OUTPATIENT)
Dept: ONCOLOGY | Facility: CLINIC | Age: 63
End: 2018-10-31

## 2018-10-31 ENCOUNTER — AMBULATORY - HEALTHEAST (OUTPATIENT)
Dept: INFUSION THERAPY | Facility: CLINIC | Age: 63
End: 2018-10-31

## 2018-10-31 DIAGNOSIS — C25.0 MALIGNANT NEOPLASM OF HEAD OF PANCREAS (H): ICD-10-CM

## 2018-10-31 DIAGNOSIS — C25.0 CA HEAD OF PANCREAS (H): ICD-10-CM

## 2018-10-31 DIAGNOSIS — G89.3 CANCER ASSOCIATED PAIN: ICD-10-CM

## 2018-10-31 DIAGNOSIS — Z51.11 ENCOUNTER FOR ANTINEOPLASTIC CHEMOTHERAPY: ICD-10-CM

## 2018-10-31 LAB
ALBUMIN SERPL-MCNC: 3.9 G/DL (ref 3.5–5)
ALP SERPL-CCNC: 157 U/L (ref 45–120)
ALT SERPL W P-5'-P-CCNC: 21 U/L (ref 0–45)
ANION GAP SERPL CALCULATED.3IONS-SCNC: 11 MMOL/L (ref 5–18)
AST SERPL W P-5'-P-CCNC: 11 U/L (ref 0–40)
BASOPHILS # BLD AUTO: 0 THOU/UL (ref 0–0.2)
BASOPHILS NFR BLD AUTO: 1 % (ref 0–2)
BILIRUB SERPL-MCNC: 0.3 MG/DL (ref 0–1)
BUN SERPL-MCNC: 16 MG/DL (ref 8–22)
CALCIUM SERPL-MCNC: 9.6 MG/DL (ref 8.5–10.5)
CHLORIDE BLD-SCNC: 109 MMOL/L (ref 98–107)
CO2 SERPL-SCNC: 20 MMOL/L (ref 22–31)
CREAT SERPL-MCNC: 0.77 MG/DL (ref 0.7–1.3)
EOSINOPHIL # BLD AUTO: 0 THOU/UL (ref 0–0.4)
EOSINOPHIL NFR BLD AUTO: 1 % (ref 0–6)
ERYTHROCYTE [DISTWIDTH] IN BLOOD BY AUTOMATED COUNT: 15.1 % (ref 11–14.5)
GFR SERPL CREATININE-BSD FRML MDRD: >60 ML/MIN/1.73M2
GLUCOSE BLD-MCNC: 164 MG/DL (ref 70–125)
HCT VFR BLD AUTO: 39.2 % (ref 40–54)
HGB BLD-MCNC: 13 G/DL (ref 14–18)
LYMPHOCYTES # BLD AUTO: 1.3 THOU/UL (ref 0.8–4.4)
LYMPHOCYTES NFR BLD AUTO: 15 % (ref 20–40)
MAGNESIUM SERPL-MCNC: 2.5 MG/DL (ref 1.8–2.6)
MCH RBC QN AUTO: 34.3 PG (ref 27–34)
MCHC RBC AUTO-ENTMCNC: 33.2 G/DL (ref 32–36)
MCV RBC AUTO: 103 FL (ref 80–100)
MONOCYTES # BLD AUTO: 1.1 THOU/UL (ref 0–0.9)
MONOCYTES NFR BLD AUTO: 12 % (ref 2–10)
NEUTROPHILS # BLD AUTO: 6.2 THOU/UL (ref 2–7.7)
NEUTROPHILS NFR BLD AUTO: 72 % (ref 50–70)
PLATELET # BLD AUTO: 132 THOU/UL (ref 140–440)
PMV BLD AUTO: 9.4 FL (ref 8.5–12.5)
POTASSIUM BLD-SCNC: 4.4 MMOL/L (ref 3.5–5)
PROT SERPL-MCNC: 6.7 G/DL (ref 6–8)
RBC # BLD AUTO: 3.79 MILL/UL (ref 4.4–6.2)
SODIUM SERPL-SCNC: 140 MMOL/L (ref 136–145)
WBC: 8.8 THOU/UL (ref 4–11)

## 2018-11-02 ENCOUNTER — INFUSION - HEALTHEAST (OUTPATIENT)
Dept: INFUSION THERAPY | Facility: CLINIC | Age: 63
End: 2018-11-02

## 2018-11-02 ENCOUNTER — COMMUNICATION - HEALTHEAST (OUTPATIENT)
Dept: ONCOLOGY | Facility: CLINIC | Age: 63
End: 2018-11-02

## 2018-11-02 DIAGNOSIS — Z51.11 ENCOUNTER FOR ANTINEOPLASTIC CHEMOTHERAPY: ICD-10-CM

## 2018-11-02 DIAGNOSIS — C25.0 CA HEAD OF PANCREAS (H): ICD-10-CM

## 2018-11-02 DIAGNOSIS — C25.0 MALIGNANT NEOPLASM OF HEAD OF PANCREAS (H): ICD-10-CM

## 2018-11-08 ENCOUNTER — HOSPITAL ENCOUNTER (OUTPATIENT)
Dept: CT IMAGING | Facility: CLINIC | Age: 63
Setting detail: RADIATION/ONCOLOGY SERIES
Discharge: STILL ADMITTED OR ADMINISTRATIVE | End: 2018-11-08
Attending: INTERNAL MEDICINE

## 2018-11-09 ENCOUNTER — COMMUNICATION - HEALTHEAST (OUTPATIENT)
Dept: ONCOLOGY | Facility: CLINIC | Age: 63
End: 2018-11-09

## 2018-11-09 DIAGNOSIS — Z51.11 ENCOUNTER FOR ANTINEOPLASTIC CHEMOTHERAPY: ICD-10-CM

## 2018-11-09 DIAGNOSIS — C25.0 MALIGNANT NEOPLASM OF HEAD OF PANCREAS (H): ICD-10-CM

## 2018-11-09 DIAGNOSIS — C25.0 CA HEAD OF PANCREAS (H): ICD-10-CM

## 2018-11-12 ENCOUNTER — COMMUNICATION - HEALTHEAST (OUTPATIENT)
Dept: ONCOLOGY | Facility: CLINIC | Age: 63
End: 2018-11-12

## 2018-11-14 ENCOUNTER — INFUSION - HEALTHEAST (OUTPATIENT)
Dept: INFUSION THERAPY | Facility: CLINIC | Age: 63
End: 2018-11-14

## 2018-11-14 ENCOUNTER — OFFICE VISIT - HEALTHEAST (OUTPATIENT)
Dept: ONCOLOGY | Facility: CLINIC | Age: 63
End: 2018-11-14

## 2018-11-14 ENCOUNTER — AMBULATORY - HEALTHEAST (OUTPATIENT)
Dept: INFUSION THERAPY | Facility: CLINIC | Age: 63
End: 2018-11-14

## 2018-11-14 DIAGNOSIS — C25.0 MALIGNANT NEOPLASM OF HEAD OF PANCREAS (H): ICD-10-CM

## 2018-11-14 DIAGNOSIS — C25.0 CA HEAD OF PANCREAS (H): ICD-10-CM

## 2018-11-14 DIAGNOSIS — Z51.11 ENCOUNTER FOR ANTINEOPLASTIC CHEMOTHERAPY: ICD-10-CM

## 2018-11-14 LAB
ALBUMIN SERPL-MCNC: 3.3 G/DL (ref 3.5–5)
ALP SERPL-CCNC: 134 U/L (ref 45–120)
ALT SERPL W P-5'-P-CCNC: 18 U/L (ref 0–45)
ANION GAP SERPL CALCULATED.3IONS-SCNC: 8 MMOL/L (ref 5–18)
AST SERPL W P-5'-P-CCNC: 14 U/L (ref 0–40)
BASOPHILS # BLD AUTO: 0 THOU/UL (ref 0–0.2)
BASOPHILS NFR BLD AUTO: 0 % (ref 0–2)
BILIRUB SERPL-MCNC: 0.2 MG/DL (ref 0–1)
BUN SERPL-MCNC: 11 MG/DL (ref 8–22)
CALCIUM SERPL-MCNC: 8.8 MG/DL (ref 8.5–10.5)
CHLORIDE BLD-SCNC: 106 MMOL/L (ref 98–107)
CO2 SERPL-SCNC: 25 MMOL/L (ref 22–31)
CREAT SERPL-MCNC: 0.7 MG/DL (ref 0.7–1.3)
EOSINOPHIL # BLD AUTO: 0 THOU/UL (ref 0–0.4)
EOSINOPHIL NFR BLD AUTO: 0 % (ref 0–6)
ERYTHROCYTE [DISTWIDTH] IN BLOOD BY AUTOMATED COUNT: 14.5 % (ref 11–14.5)
GFR SERPL CREATININE-BSD FRML MDRD: >60 ML/MIN/1.73M2
GLUCOSE BLD-MCNC: 122 MG/DL (ref 70–125)
HCT VFR BLD AUTO: 29.8 % (ref 40–54)
HGB BLD-MCNC: 9.9 G/DL (ref 14–18)
LYMPHOCYTES # BLD AUTO: 1.2 THOU/UL (ref 0.8–4.4)
LYMPHOCYTES NFR BLD AUTO: 13 % (ref 20–40)
MAGNESIUM SERPL-MCNC: 1.8 MG/DL (ref 1.8–2.6)
MCH RBC QN AUTO: 34.3 PG (ref 27–34)
MCHC RBC AUTO-ENTMCNC: 33.2 G/DL (ref 32–36)
MCV RBC AUTO: 103 FL (ref 80–100)
MONOCYTES # BLD AUTO: 1.3 THOU/UL (ref 0–0.9)
MONOCYTES NFR BLD AUTO: 15 % (ref 2–10)
NEUTROPHILS # BLD AUTO: 6.2 THOU/UL (ref 2–7.7)
NEUTROPHILS NFR BLD AUTO: 71 % (ref 50–70)
PLATELET # BLD AUTO: 129 THOU/UL (ref 140–440)
PMV BLD AUTO: 8.6 FL (ref 8.5–12.5)
POTASSIUM BLD-SCNC: 3.9 MMOL/L (ref 3.5–5)
PROT SERPL-MCNC: 5.6 G/DL (ref 6–8)
RBC # BLD AUTO: 2.89 MILL/UL (ref 4.4–6.2)
SODIUM SERPL-SCNC: 139 MMOL/L (ref 136–145)
WBC: 8.8 THOU/UL (ref 4–11)

## 2018-11-16 ENCOUNTER — INFUSION - HEALTHEAST (OUTPATIENT)
Dept: INFUSION THERAPY | Facility: CLINIC | Age: 63
End: 2018-11-16

## 2018-11-16 DIAGNOSIS — C25.0 MALIGNANT NEOPLASM OF HEAD OF PANCREAS (H): ICD-10-CM

## 2018-11-16 DIAGNOSIS — C25.0 CA HEAD OF PANCREAS (H): ICD-10-CM

## 2018-11-16 DIAGNOSIS — Z51.11 ENCOUNTER FOR ANTINEOPLASTIC CHEMOTHERAPY: ICD-10-CM

## 2018-11-28 ENCOUNTER — AMBULATORY - HEALTHEAST (OUTPATIENT)
Dept: INFUSION THERAPY | Facility: CLINIC | Age: 63
End: 2018-11-28

## 2018-11-28 ENCOUNTER — OFFICE VISIT - HEALTHEAST (OUTPATIENT)
Dept: ONCOLOGY | Facility: CLINIC | Age: 63
End: 2018-11-28

## 2018-11-28 DIAGNOSIS — C25.0 MALIGNANT NEOPLASM OF HEAD OF PANCREAS (H): ICD-10-CM

## 2018-11-28 DIAGNOSIS — Z51.11 ENCOUNTER FOR ANTINEOPLASTIC CHEMOTHERAPY: ICD-10-CM

## 2018-11-28 DIAGNOSIS — G89.3 CANCER ASSOCIATED PAIN: ICD-10-CM

## 2018-11-28 DIAGNOSIS — C25.0 CA HEAD OF PANCREAS (H): ICD-10-CM

## 2018-11-28 LAB
ALBUMIN SERPL-MCNC: 2.8 G/DL (ref 3.5–5)
ALP SERPL-CCNC: 133 U/L (ref 45–120)
ALT SERPL W P-5'-P-CCNC: 16 U/L (ref 0–45)
ANION GAP SERPL CALCULATED.3IONS-SCNC: 6 MMOL/L (ref 5–18)
AST SERPL W P-5'-P-CCNC: 15 U/L (ref 0–40)
BASOPHILS # BLD AUTO: 0 THOU/UL (ref 0–0.2)
BASOPHILS NFR BLD AUTO: 0 % (ref 0–2)
BILIRUB SERPL-MCNC: 0.2 MG/DL (ref 0–1)
BUN SERPL-MCNC: 11 MG/DL (ref 8–22)
CALCIUM SERPL-MCNC: 8.7 MG/DL (ref 8.5–10.5)
CHLORIDE BLD-SCNC: 102 MMOL/L (ref 98–107)
CO2 SERPL-SCNC: 30 MMOL/L (ref 22–31)
CREAT SERPL-MCNC: 0.62 MG/DL (ref 0.7–1.3)
EOSINOPHIL # BLD AUTO: 0 THOU/UL (ref 0–0.4)
EOSINOPHIL NFR BLD AUTO: 0 % (ref 0–6)
ERYTHROCYTE [DISTWIDTH] IN BLOOD BY AUTOMATED COUNT: 14.8 % (ref 11–14.5)
GFR SERPL CREATININE-BSD FRML MDRD: >60 ML/MIN/1.73M2
GLUCOSE BLD-MCNC: 152 MG/DL (ref 70–125)
HCT VFR BLD AUTO: 29 % (ref 40–54)
HGB BLD-MCNC: 9.2 G/DL (ref 14–18)
LYMPHOCYTES # BLD AUTO: 0.9 THOU/UL (ref 0.8–4.4)
LYMPHOCYTES NFR BLD AUTO: 9 % (ref 20–40)
MAGNESIUM SERPL-MCNC: 1.8 MG/DL (ref 1.8–2.6)
MCH RBC QN AUTO: 33.3 PG (ref 27–34)
MCHC RBC AUTO-ENTMCNC: 31.7 G/DL (ref 32–36)
MCV RBC AUTO: 105 FL (ref 80–100)
MONOCYTES # BLD AUTO: 1.2 THOU/UL (ref 0–0.9)
MONOCYTES NFR BLD AUTO: 12 % (ref 2–10)
NEUTROPHILS # BLD AUTO: 7.7 THOU/UL (ref 2–7.7)
NEUTROPHILS NFR BLD AUTO: 79 % (ref 50–70)
PLATELET # BLD AUTO: 270 THOU/UL (ref 140–440)
PMV BLD AUTO: 8.9 FL (ref 8.5–12.5)
POTASSIUM BLD-SCNC: 3.9 MMOL/L (ref 3.5–5)
PROT SERPL-MCNC: 5.5 G/DL (ref 6–8)
RBC # BLD AUTO: 2.76 MILL/UL (ref 4.4–6.2)
SODIUM SERPL-SCNC: 138 MMOL/L (ref 136–145)
WBC: 9.8 THOU/UL (ref 4–11)

## 2018-11-28 RX ORDER — DIPHENOXYLATE HCL/ATROPINE 2.5-.025MG
TABLET ORAL
Qty: 120 TABLET | Refills: 1 | Status: SHIPPED | OUTPATIENT
Start: 2018-11-28

## 2018-11-29 ENCOUNTER — HOSPITAL ENCOUNTER (OUTPATIENT)
Dept: INTERVENTIONAL RADIOLOGY/VASCULAR | Facility: HOSPITAL | Age: 63
Setting detail: RADIATION/ONCOLOGY SERIES
Discharge: STILL ADMITTED OR ADMINISTRATIVE | End: 2018-11-29
Attending: INTERNAL MEDICINE

## 2018-11-29 DIAGNOSIS — Z51.11 ENCOUNTER FOR ANTINEOPLASTIC CHEMOTHERAPY: ICD-10-CM

## 2018-11-29 DIAGNOSIS — C25.0 MALIGNANT NEOPLASM OF HEAD OF PANCREAS (H): ICD-10-CM

## 2018-11-29 ASSESSMENT — MIFFLIN-ST. JEOR: SCORE: 1489.79

## 2018-12-05 ENCOUNTER — AMBULATORY - HEALTHEAST (OUTPATIENT)
Dept: INFUSION THERAPY | Facility: CLINIC | Age: 63
End: 2018-12-05

## 2018-12-05 ENCOUNTER — INFUSION - HEALTHEAST (OUTPATIENT)
Dept: INFUSION THERAPY | Facility: CLINIC | Age: 63
End: 2018-12-05

## 2018-12-05 ENCOUNTER — OFFICE VISIT - HEALTHEAST (OUTPATIENT)
Dept: ONCOLOGY | Facility: CLINIC | Age: 63
End: 2018-12-05

## 2018-12-05 DIAGNOSIS — Z51.11 ENCOUNTER FOR ANTINEOPLASTIC CHEMOTHERAPY: ICD-10-CM

## 2018-12-05 DIAGNOSIS — C25.0 CA HEAD OF PANCREAS (H): ICD-10-CM

## 2018-12-05 DIAGNOSIS — C25.0 MALIGNANT NEOPLASM OF HEAD OF PANCREAS (H): ICD-10-CM

## 2018-12-05 DIAGNOSIS — C25.0 MALIGNANT TUMOR OF HEAD OF PANCREAS (H): ICD-10-CM

## 2018-12-05 DIAGNOSIS — G89.3 CANCER ASSOCIATED PAIN: ICD-10-CM

## 2018-12-05 LAB
ALBUMIN SERPL-MCNC: 3.1 G/DL (ref 3.5–5)
ALP SERPL-CCNC: 136 U/L (ref 45–120)
ALT SERPL W P-5'-P-CCNC: 36 U/L (ref 0–45)
ANION GAP SERPL CALCULATED.3IONS-SCNC: 8 MMOL/L (ref 5–18)
AST SERPL W P-5'-P-CCNC: 22 U/L (ref 0–40)
BASOPHILS # BLD AUTO: 0 THOU/UL (ref 0–0.2)
BASOPHILS NFR BLD AUTO: 0 % (ref 0–2)
BILIRUB SERPL-MCNC: 0.2 MG/DL (ref 0–1)
BUN SERPL-MCNC: 11 MG/DL (ref 8–22)
CALCIUM SERPL-MCNC: 9 MG/DL (ref 8.5–10.5)
CHLORIDE BLD-SCNC: 106 MMOL/L (ref 98–107)
CO2 SERPL-SCNC: 26 MMOL/L (ref 22–31)
CREAT SERPL-MCNC: 0.64 MG/DL (ref 0.7–1.3)
EOSINOPHIL # BLD AUTO: 0 THOU/UL (ref 0–0.4)
EOSINOPHIL NFR BLD AUTO: 1 % (ref 0–6)
ERYTHROCYTE [DISTWIDTH] IN BLOOD BY AUTOMATED COUNT: 15 % (ref 11–14.5)
GFR SERPL CREATININE-BSD FRML MDRD: >60 ML/MIN/1.73M2
GLUCOSE BLD-MCNC: 81 MG/DL (ref 70–125)
HCT VFR BLD AUTO: 31 % (ref 40–54)
HGB BLD-MCNC: 9.7 G/DL (ref 14–18)
LYMPHOCYTES # BLD AUTO: 1 THOU/UL (ref 0.8–4.4)
LYMPHOCYTES NFR BLD AUTO: 14 % (ref 20–40)
MAGNESIUM SERPL-MCNC: 2.2 MG/DL (ref 1.8–2.6)
MCH RBC QN AUTO: 33.2 PG (ref 27–34)
MCHC RBC AUTO-ENTMCNC: 31.3 G/DL (ref 32–36)
MCV RBC AUTO: 106 FL (ref 80–100)
MONOCYTES # BLD AUTO: 0.7 THOU/UL (ref 0–0.9)
MONOCYTES NFR BLD AUTO: 10 % (ref 2–10)
NEUTROPHILS # BLD AUTO: 5.4 THOU/UL (ref 2–7.7)
NEUTROPHILS NFR BLD AUTO: 75 % (ref 50–70)
PLATELET # BLD AUTO: 285 THOU/UL (ref 140–440)
PMV BLD AUTO: 8.6 FL (ref 8.5–12.5)
POTASSIUM BLD-SCNC: 4.2 MMOL/L (ref 3.5–5)
PROT SERPL-MCNC: 5.7 G/DL (ref 6–8)
RBC # BLD AUTO: 2.92 MILL/UL (ref 4.4–6.2)
SODIUM SERPL-SCNC: 140 MMOL/L (ref 136–145)
WBC: 7.2 THOU/UL (ref 4–11)

## 2018-12-06 ENCOUNTER — COMMUNICATION - HEALTHEAST (OUTPATIENT)
Dept: ONCOLOGY | Facility: CLINIC | Age: 63
End: 2018-12-06

## 2018-12-06 ENCOUNTER — HOSPITAL ENCOUNTER (OUTPATIENT)
Dept: INTERVENTIONAL RADIOLOGY/VASCULAR | Facility: HOSPITAL | Age: 63
Discharge: HOME | End: 2018-12-06
Admitting: RADIOLOGY

## 2018-12-06 DIAGNOSIS — K83.1 BILIARY OBSTRUCTION (H): ICD-10-CM

## 2018-12-06 ASSESSMENT — MIFFLIN-ST. JEOR: SCORE: 1503.4

## 2018-12-07 ENCOUNTER — INFUSION - HEALTHEAST (OUTPATIENT)
Dept: INFUSION THERAPY | Facility: CLINIC | Age: 63
End: 2018-12-07

## 2018-12-07 DIAGNOSIS — C25.0 CA HEAD OF PANCREAS (H): ICD-10-CM

## 2018-12-07 DIAGNOSIS — Z51.11 ENCOUNTER FOR ANTINEOPLASTIC CHEMOTHERAPY: ICD-10-CM

## 2018-12-07 DIAGNOSIS — C25.0 MALIGNANT NEOPLASM OF HEAD OF PANCREAS (H): ICD-10-CM

## 2018-12-12 ENCOUNTER — COMMUNICATION - HEALTHEAST (OUTPATIENT)
Dept: ONCOLOGY | Facility: CLINIC | Age: 63
End: 2018-12-12

## 2018-12-12 ENCOUNTER — COMMUNICATION - HEALTHEAST (OUTPATIENT)
Dept: INTERVENTIONAL RADIOLOGY/VASCULAR | Facility: HOSPITAL | Age: 63
End: 2018-12-12

## 2018-12-13 ENCOUNTER — OFFICE VISIT - HEALTHEAST (OUTPATIENT)
Dept: ONCOLOGY | Facility: CLINIC | Age: 63
End: 2018-12-13

## 2018-12-13 DIAGNOSIS — G89.3 CANCER ASSOCIATED PAIN: ICD-10-CM

## 2018-12-13 DIAGNOSIS — C25.9 PANCREATIC CANCER (H): ICD-10-CM

## 2018-12-13 DIAGNOSIS — K83.1 OBSTRUCTIVE JAUNDICE (H): ICD-10-CM

## 2018-12-13 DIAGNOSIS — C25.0 CA HEAD OF PANCREAS (H): ICD-10-CM

## 2018-12-13 LAB
ALBUMIN SERPL-MCNC: 3.3 G/DL (ref 3.5–5)
ALP SERPL-CCNC: 200 U/L (ref 45–120)
ALT SERPL W P-5'-P-CCNC: 19 U/L (ref 0–45)
AST SERPL W P-5'-P-CCNC: 11 U/L (ref 0–40)
BASOPHILS # BLD AUTO: 0.1 THOU/UL (ref 0–0.2)
BASOPHILS NFR BLD AUTO: 0 % (ref 0–2)
BILIRUB DIRECT SERPL-MCNC: <0.1 MG/DL
BILIRUB SERPL-MCNC: 0.2 MG/DL (ref 0–1)
EOSINOPHIL # BLD AUTO: 0.1 THOU/UL (ref 0–0.4)
EOSINOPHIL NFR BLD AUTO: 0 % (ref 0–6)
ERYTHROCYTE [DISTWIDTH] IN BLOOD BY AUTOMATED COUNT: 14.8 % (ref 11–14.5)
HCT VFR BLD AUTO: 33.9 % (ref 40–54)
HGB BLD-MCNC: 10.9 G/DL (ref 14–18)
LYMPHOCYTES # BLD AUTO: 1.2 THOU/UL (ref 0.8–4.4)
LYMPHOCYTES NFR BLD AUTO: 5 % (ref 20–40)
MCH RBC QN AUTO: 33.3 PG (ref 27–34)
MCHC RBC AUTO-ENTMCNC: 32.2 G/DL (ref 32–36)
MCV RBC AUTO: 104 FL (ref 80–100)
MONOCYTES # BLD AUTO: 3.2 THOU/UL (ref 0–0.9)
MONOCYTES NFR BLD AUTO: 14 % (ref 2–10)
NEUTROPHILS # BLD AUTO: 18.1 THOU/UL (ref 2–7.7)
NEUTROPHILS NFR BLD AUTO: 80 % (ref 50–70)
PLAT MORPH BLD: NORMAL
PLATELET # BLD AUTO: 235 THOU/UL (ref 140–440)
PMV BLD AUTO: 9.6 FL (ref 8.5–12.5)
PROT SERPL-MCNC: 5.7 G/DL (ref 6–8)
RBC # BLD AUTO: 3.27 MILL/UL (ref 4.4–6.2)
URATE SERPL-MCNC: 4.4 MG/DL (ref 3–8)
WBC: 22.8 THOU/UL (ref 4–11)

## 2018-12-18 ENCOUNTER — COMMUNICATION - HEALTHEAST (OUTPATIENT)
Dept: ONCOLOGY | Facility: CLINIC | Age: 63
End: 2018-12-18

## 2019-01-01 ENCOUNTER — COMMUNICATION - HEALTHEAST (OUTPATIENT)
Dept: ONCOLOGY | Facility: CLINIC | Age: 64
End: 2019-01-01

## 2019-01-01 ENCOUNTER — HOSPITAL ENCOUNTER (OUTPATIENT)
Dept: CT IMAGING | Facility: HOSPITAL | Age: 64
Discharge: HOME | End: 2019-07-01
Attending: SURGERY

## 2019-01-01 ENCOUNTER — HOSPITAL ENCOUNTER (OUTPATIENT)
Dept: CT IMAGING | Facility: CLINIC | Age: 64
Discharge: HOME | End: 2019-05-21
Attending: SURGERY

## 2019-01-01 ENCOUNTER — HOSPITAL ENCOUNTER (OUTPATIENT)
Dept: CT IMAGING | Facility: CLINIC | Age: 64
Setting detail: RADIATION/ONCOLOGY SERIES
Discharge: STILL ADMITTED OR ADMINISTRATIVE | End: 2019-09-24
Attending: INTERNAL MEDICINE

## 2019-01-01 ENCOUNTER — AMBULATORY - HEALTHEAST (OUTPATIENT)
Dept: INFUSION THERAPY | Facility: CLINIC | Age: 64
End: 2019-01-01

## 2019-01-01 ENCOUNTER — ANESTHESIA - HEALTHEAST (OUTPATIENT)
Dept: SURGERY | Facility: CLINIC | Age: 64
End: 2019-01-01

## 2019-01-01 ENCOUNTER — AMBULATORY - HEALTHEAST (OUTPATIENT)
Dept: ONCOLOGY | Facility: CLINIC | Age: 64
End: 2019-01-01

## 2019-01-01 ENCOUNTER — HOSPITAL ENCOUNTER (OUTPATIENT)
Dept: MRI IMAGING | Facility: CLINIC | Age: 64
Setting detail: RADIATION/ONCOLOGY SERIES
Discharge: STILL ADMITTED OR ADMINISTRATIVE | End: 2019-02-12
Attending: INTERNAL MEDICINE

## 2019-01-01 ENCOUNTER — DOCUMENTATION ONLY (OUTPATIENT)
Dept: OTHER | Facility: CLINIC | Age: 64
End: 2019-01-01

## 2019-01-01 ENCOUNTER — INFUSION - HEALTHEAST (OUTPATIENT)
Dept: INFUSION THERAPY | Facility: CLINIC | Age: 64
End: 2019-01-01

## 2019-01-01 ENCOUNTER — RECORDS - HEALTHEAST (OUTPATIENT)
Dept: ADMINISTRATIVE | Facility: OTHER | Age: 64
End: 2019-01-01

## 2019-01-01 ENCOUNTER — OFFICE VISIT - HEALTHEAST (OUTPATIENT)
Dept: ONCOLOGY | Facility: CLINIC | Age: 64
End: 2019-01-01

## 2019-01-01 ENCOUNTER — SURGERY - HEALTHEAST (OUTPATIENT)
Dept: SURGERY | Facility: CLINIC | Age: 64
End: 2019-01-01

## 2019-01-01 ENCOUNTER — COMMUNICATION - HEALTHEAST (OUTPATIENT)
Dept: ONCOLOGY | Facility: HOSPITAL | Age: 64
End: 2019-01-01

## 2019-01-01 ENCOUNTER — AMBULATORY - HEALTHEAST (OUTPATIENT)
Dept: ONCOLOGY | Facility: HOSPITAL | Age: 64
End: 2019-01-01

## 2019-01-01 ENCOUNTER — HOSPITAL ENCOUNTER (OUTPATIENT)
Dept: INTERVENTIONAL RADIOLOGY/VASCULAR | Facility: HOSPITAL | Age: 64
Discharge: HOME | End: 2019-07-01
Attending: SURGERY | Admitting: RADIOLOGY

## 2019-01-01 ENCOUNTER — HOSPITAL ENCOUNTER (OUTPATIENT)
Dept: ULTRASOUND IMAGING | Facility: HOSPITAL | Age: 64
Discharge: HOME | End: 2019-07-01
Attending: SURGERY

## 2019-01-01 ENCOUNTER — AMBULATORY - HEALTHEAST (OUTPATIENT)
Dept: OTHER | Facility: CLINIC | Age: 64
End: 2019-01-01

## 2019-01-01 ENCOUNTER — COMMUNICATION - HEALTHEAST (OUTPATIENT)
Dept: ADMINISTRATIVE | Facility: HOSPITAL | Age: 64
End: 2019-01-01

## 2019-01-01 ENCOUNTER — HOSPITAL ENCOUNTER (OUTPATIENT)
Dept: CT IMAGING | Facility: CLINIC | Age: 64
Setting detail: RADIATION/ONCOLOGY SERIES
Discharge: STILL ADMITTED OR ADMINISTRATIVE | End: 2019-02-12
Attending: INTERNAL MEDICINE

## 2019-01-01 DIAGNOSIS — C25.0 CA HEAD OF PANCREAS (H): ICD-10-CM

## 2019-01-01 DIAGNOSIS — C25.9 PANCREATIC CANCER METASTASIZED TO LIVER (H): ICD-10-CM

## 2019-01-01 DIAGNOSIS — C25.0 MALIGNANT NEOPLASM OF HEAD OF PANCREAS (H): ICD-10-CM

## 2019-01-01 DIAGNOSIS — E11.8 TYPE 2 DIABETES MELLITUS WITH COMPLICATION, WITHOUT LONG-TERM CURRENT USE OF INSULIN (H): ICD-10-CM

## 2019-01-01 DIAGNOSIS — C78.7 PANCREATIC CANCER METASTASIZED TO LIVER (H): ICD-10-CM

## 2019-01-01 DIAGNOSIS — E86.0 DEHYDRATION: ICD-10-CM

## 2019-01-01 DIAGNOSIS — Z51.11 ENCOUNTER FOR ANTINEOPLASTIC CHEMOTHERAPY: ICD-10-CM

## 2019-01-01 DIAGNOSIS — G47.00 PERSISTENT INSOMNIA: ICD-10-CM

## 2019-01-01 DIAGNOSIS — D50.9 IRON DEFICIENCY ANEMIA, UNSPECIFIED IRON DEFICIENCY ANEMIA TYPE: ICD-10-CM

## 2019-01-01 DIAGNOSIS — E87.6 HYPOKALEMIA: ICD-10-CM

## 2019-01-01 DIAGNOSIS — F43.21 ADJUSTMENT DISORDER WITH DEPRESSED MOOD: ICD-10-CM

## 2019-01-01 DIAGNOSIS — K86.89 PANCREATIC INSUFFICIENCY: ICD-10-CM

## 2019-01-01 DIAGNOSIS — G89.3 CANCER RELATED PAIN: ICD-10-CM

## 2019-01-01 DIAGNOSIS — G89.3 CANCER ASSOCIATED PAIN: ICD-10-CM

## 2019-01-01 DIAGNOSIS — D63.0 ANEMIA IN NEOPLASTIC DISEASE: ICD-10-CM

## 2019-01-01 DIAGNOSIS — E83.42 HYPOMAGNESEMIA: ICD-10-CM

## 2019-01-01 DIAGNOSIS — C25.9 PANCREATIC CANCER (H): ICD-10-CM

## 2019-01-01 DIAGNOSIS — R16.0 LIVER MASS, RIGHT LOBE: ICD-10-CM

## 2019-01-01 DIAGNOSIS — R10.31 RIGHT LOWER QUADRANT ABDOMINAL PAIN: ICD-10-CM

## 2019-01-01 LAB
ALBUMIN SERPL-MCNC: 3.3 G/DL (ref 3.5–5)
ALBUMIN SERPL-MCNC: 3.3 G/DL (ref 3.5–5)
ALBUMIN SERPL-MCNC: 3.4 G/DL (ref 3.5–5)
ALBUMIN SERPL-MCNC: 3.5 G/DL (ref 3.5–5)
ALBUMIN SERPL-MCNC: 3.6 G/DL (ref 3.5–5)
ALBUMIN SERPL-MCNC: 3.7 G/DL (ref 3.5–5)
ALBUMIN SERPL-MCNC: 3.8 G/DL (ref 3.5–5)
ALP SERPL-CCNC: 103 U/L (ref 45–120)
ALP SERPL-CCNC: 152 U/L (ref 45–120)
ALP SERPL-CCNC: 187 U/L (ref 45–120)
ALP SERPL-CCNC: 198 U/L (ref 45–120)
ALP SERPL-CCNC: 227 U/L (ref 45–120)
ALP SERPL-CCNC: 278 U/L (ref 45–120)
ALP SERPL-CCNC: 425 U/L (ref 45–120)
ALP SERPL-CCNC: 506 U/L (ref 45–120)
ALP SERPL-CCNC: 99 U/L (ref 45–120)
ALT SERPL W P-5'-P-CCNC: 20 U/L (ref 0–45)
ALT SERPL W P-5'-P-CCNC: 23 U/L (ref 0–45)
ALT SERPL W P-5'-P-CCNC: 251 U/L (ref 0–45)
ALT SERPL W P-5'-P-CCNC: 29 U/L (ref 0–45)
ALT SERPL W P-5'-P-CCNC: 30 U/L (ref 0–45)
ALT SERPL W P-5'-P-CCNC: 36 U/L (ref 0–45)
ALT SERPL W P-5'-P-CCNC: 37 U/L (ref 0–45)
ALT SERPL W P-5'-P-CCNC: 57 U/L (ref 0–45)
ALT SERPL W P-5'-P-CCNC: 84 U/L (ref 0–45)
ANION GAP SERPL CALCULATED.3IONS-SCNC: 10 MMOL/L (ref 5–18)
ANION GAP SERPL CALCULATED.3IONS-SCNC: 12 MMOL/L (ref 5–18)
ANION GAP SERPL CALCULATED.3IONS-SCNC: 12 MMOL/L (ref 5–18)
ANION GAP SERPL CALCULATED.3IONS-SCNC: 7 MMOL/L (ref 5–18)
ANION GAP SERPL CALCULATED.3IONS-SCNC: 7 MMOL/L (ref 5–18)
ANION GAP SERPL CALCULATED.3IONS-SCNC: 8 MMOL/L (ref 5–18)
ANION GAP SERPL CALCULATED.3IONS-SCNC: 8 MMOL/L (ref 5–18)
ANION GAP SERPL CALCULATED.3IONS-SCNC: 9 MMOL/L (ref 5–18)
ANION GAP SERPL CALCULATED.3IONS-SCNC: 9 MMOL/L (ref 5–18)
AST SERPL W P-5'-P-CCNC: 11 U/L (ref 0–40)
AST SERPL W P-5'-P-CCNC: 118 U/L (ref 0–40)
AST SERPL W P-5'-P-CCNC: 13 U/L (ref 0–40)
AST SERPL W P-5'-P-CCNC: 14 U/L (ref 0–40)
AST SERPL W P-5'-P-CCNC: 19 U/L (ref 0–40)
AST SERPL W P-5'-P-CCNC: 20 U/L (ref 0–40)
AST SERPL W P-5'-P-CCNC: 23 U/L (ref 0–40)
AST SERPL W P-5'-P-CCNC: 28 U/L (ref 0–40)
AST SERPL W P-5'-P-CCNC: 44 U/L (ref 0–40)
BASOPHILS # BLD AUTO: 0 THOU/UL (ref 0–0.2)
BASOPHILS NFR BLD AUTO: 0 % (ref 0–2)
BASOPHILS NFR BLD AUTO: 1 % (ref 0–2)
BILIRUB SERPL-MCNC: 0.2 MG/DL (ref 0–1)
BILIRUB SERPL-MCNC: 0.3 MG/DL (ref 0–1)
BILIRUB SERPL-MCNC: 0.3 MG/DL (ref 0–1)
BILIRUB SERPL-MCNC: 0.6 MG/DL (ref 0–1)
BUN SERPL-MCNC: 11 MG/DL (ref 8–22)
BUN SERPL-MCNC: 14 MG/DL (ref 8–22)
BUN SERPL-MCNC: 15 MG/DL (ref 8–22)
BUN SERPL-MCNC: 16 MG/DL (ref 8–22)
BUN SERPL-MCNC: 8 MG/DL (ref 8–22)
BUN SERPL-MCNC: 8 MG/DL (ref 8–22)
BUN SERPL-MCNC: 9 MG/DL (ref 8–22)
CALCIUM SERPL-MCNC: 8.8 MG/DL (ref 8.5–10.5)
CALCIUM SERPL-MCNC: 8.9 MG/DL (ref 8.5–10.5)
CALCIUM SERPL-MCNC: 9.1 MG/DL (ref 8.5–10.5)
CALCIUM SERPL-MCNC: 9.2 MG/DL (ref 8.5–10.5)
CALCIUM SERPL-MCNC: 9.2 MG/DL (ref 8.5–10.5)
CALCIUM SERPL-MCNC: 9.3 MG/DL (ref 8.5–10.5)
CALCIUM SERPL-MCNC: 9.7 MG/DL (ref 8.5–10.5)
CANCER AG19-9 SERPL IA-ACNC: 1400 U/ML (ref 0–37)
CANCER AG19-9 SERPL IA-ACNC: 2257 U/ML (ref 0–37)
CANCER AG19-9 SERPL IA-ACNC: 2499 U/ML (ref 0–37)
CANCER AG19-9 SERPL IA-ACNC: 36 U/ML (ref 0–37)
CANCER AG19-9 SERPL IA-ACNC: ABNORMAL U/ML (ref 0–37)
CAP COMMENT: ABNORMAL
CHLORIDE BLD-SCNC: 102 MMOL/L (ref 98–107)
CHLORIDE BLD-SCNC: 104 MMOL/L (ref 98–107)
CHLORIDE BLD-SCNC: 105 MMOL/L (ref 98–107)
CHLORIDE BLD-SCNC: 106 MMOL/L (ref 98–107)
CHLORIDE BLD-SCNC: 108 MMOL/L (ref 98–107)
CHLORIDE BLD-SCNC: 86 MMOL/L (ref 98–107)
CHLORIDE BLD-SCNC: 99 MMOL/L (ref 98–107)
CO2 SERPL-SCNC: 23 MMOL/L (ref 22–31)
CO2 SERPL-SCNC: 24 MMOL/L (ref 22–31)
CO2 SERPL-SCNC: 25 MMOL/L (ref 22–31)
CO2 SERPL-SCNC: 26 MMOL/L (ref 22–31)
CO2 SERPL-SCNC: 27 MMOL/L (ref 22–31)
CO2 SERPL-SCNC: 27 MMOL/L (ref 22–31)
CO2 SERPL-SCNC: 29 MMOL/L (ref 22–31)
CO2 SERPL-SCNC: 29 MMOL/L (ref 22–31)
CO2 SERPL-SCNC: 47 MMOL/L (ref 22–31)
CREAT BLD-MCNC: 0.5 MG/DL (ref 0.7–1.3)
CREAT BLD-MCNC: 0.6 MG/DL (ref 0.7–1.3)
CREAT SERPL-MCNC: 0.63 MG/DL (ref 0.7–1.3)
CREAT SERPL-MCNC: 0.68 MG/DL (ref 0.7–1.3)
CREAT SERPL-MCNC: 0.75 MG/DL (ref 0.7–1.3)
CREAT SERPL-MCNC: 0.75 MG/DL (ref 0.7–1.3)
CREAT SERPL-MCNC: 0.77 MG/DL (ref 0.7–1.3)
CREAT SERPL-MCNC: 0.78 MG/DL (ref 0.7–1.3)
CREAT SERPL-MCNC: 0.8 MG/DL (ref 0.7–1.3)
CREAT SERPL-MCNC: 0.87 MG/DL (ref 0.7–1.3)
CREAT SERPL-MCNC: 1.02 MG/DL (ref 0.7–1.3)
EOSINOPHIL # BLD AUTO: 0 THOU/UL (ref 0–0.4)
EOSINOPHIL # BLD AUTO: 0.1 THOU/UL (ref 0–0.4)
EOSINOPHIL # BLD AUTO: 0.2 THOU/UL (ref 0–0.4)
EOSINOPHIL COUNT (ABSOLUTE): 0.1 THOU/UL (ref 0–0.4)
EOSINOPHIL NFR BLD AUTO: 0 % (ref 0–6)
EOSINOPHIL NFR BLD AUTO: 1 % (ref 0–6)
EOSINOPHIL NFR BLD AUTO: 3 % (ref 0–6)
ERYTHROCYTE [DISTWIDTH] IN BLOOD BY AUTOMATED COUNT: 13.5 % (ref 11–14.5)
ERYTHROCYTE [DISTWIDTH] IN BLOOD BY AUTOMATED COUNT: 14.1 % (ref 11–14.5)
ERYTHROCYTE [DISTWIDTH] IN BLOOD BY AUTOMATED COUNT: 14.2 % (ref 11–14.5)
ERYTHROCYTE [DISTWIDTH] IN BLOOD BY AUTOMATED COUNT: 16.6 % (ref 11–14.5)
ERYTHROCYTE [DISTWIDTH] IN BLOOD BY AUTOMATED COUNT: 17.5 % (ref 11–14.5)
ERYTHROCYTE [DISTWIDTH] IN BLOOD BY AUTOMATED COUNT: 19.8 % (ref 11–14.5)
ERYTHROCYTE [DISTWIDTH] IN BLOOD BY AUTOMATED COUNT: 20.5 % (ref 11–14.5)
ERYTHROCYTE [DISTWIDTH] IN BLOOD BY AUTOMATED COUNT: 23.4 % (ref 11–14.5)
ERYTHROCYTE [DISTWIDTH] IN BLOOD BY AUTOMATED COUNT: 23.5 % (ref 11–14.5)
ERYTHROCYTE [DISTWIDTH] IN BLOOD BY AUTOMATED COUNT: 23.5 % (ref 11–14.5)
ERYTHROCYTE [DISTWIDTH] IN BLOOD BY AUTOMATED COUNT: 23.6 % (ref 11–14.5)
FERRITIN SERPL-MCNC: 123 NG/ML (ref 27–300)
FERRITIN SERPL-MCNC: 21 NG/ML (ref 27–300)
GFR SERPL CREATININE-BSD FRML MDRD: >60 ML/MIN/1.73M2
GLUCOSE BLD-MCNC: 174 MG/DL (ref 70–125)
GLUCOSE BLD-MCNC: 294 MG/DL (ref 70–125)
GLUCOSE BLD-MCNC: 304 MG/DL (ref 70–125)
GLUCOSE BLD-MCNC: 352 MG/DL (ref 70–125)
GLUCOSE BLD-MCNC: 353 MG/DL (ref 70–125)
GLUCOSE BLD-MCNC: 356 MG/DL (ref 70–125)
GLUCOSE BLD-MCNC: 397 MG/DL (ref 70–125)
GLUCOSE BLD-MCNC: 432 MG/DL (ref 70–125)
GLUCOSE BLD-MCNC: 96 MG/DL (ref 70–125)
GLUCOSE BLDC GLUCOMTR-MCNC: 248 MG/DL (ref 70–139)
HCT VFR BLD AUTO: 27 % (ref 40–54)
HCT VFR BLD AUTO: 27.6 % (ref 40–54)
HCT VFR BLD AUTO: 27.7 % (ref 40–54)
HCT VFR BLD AUTO: 30.8 % (ref 40–54)
HCT VFR BLD AUTO: 32.7 % (ref 40–54)
HCT VFR BLD AUTO: 33.9 % (ref 40–54)
HCT VFR BLD AUTO: 34.1 % (ref 40–54)
HCT VFR BLD AUTO: 34.4 % (ref 40–54)
HCT VFR BLD AUTO: 34.8 % (ref 40–54)
HCT VFR BLD AUTO: 35.7 % (ref 40–54)
HCT VFR BLD AUTO: 37.3 % (ref 40–54)
HGB BLD-MCNC: 10.2 G/DL (ref 14–18)
HGB BLD-MCNC: 10.3 G/DL (ref 14–18)
HGB BLD-MCNC: 10.6 G/DL (ref 14–18)
HGB BLD-MCNC: 10.7 G/DL (ref 14–18)
HGB BLD-MCNC: 11.3 G/DL (ref 14–18)
HGB BLD-MCNC: 11.4 G/DL (ref 14–18)
HGB BLD-MCNC: 12.1 G/DL (ref 14–18)
HGB BLD-MCNC: 14.3 G/DL (ref 14–18)
HGB BLD-MCNC: 8.3 G/DL (ref 14–18)
HGB BLD-MCNC: 8.4 G/DL (ref 14–18)
HGB BLD-MCNC: 8.5 G/DL (ref 14–18)
HGB BLD-MCNC: 9.5 G/DL (ref 14–18)
INR PPP: 1.08 (ref 0.9–1.1)
IRON SATN MFR SERPL: 25 % (ref 20–50)
IRON SATN MFR SERPL: 4 % (ref 20–50)
IRON SERPL-MCNC: 15 UG/DL (ref 42–175)
IRON SERPL-MCNC: 48 UG/DL (ref 42–175)
LAB AP CHARGES (HE HISTORICAL CONVERSION): ABNORMAL
LAB AP INITIAL CYTO EVAL (HE HISTORICAL CONVERSION): ABNORMAL
LAB MED GENERAL PATH INTERP (HE HISTORICAL CONVERSION): ABNORMAL
LYMPHOCYTES # BLD AUTO: 0.7 THOU/UL (ref 0.8–4.4)
LYMPHOCYTES # BLD AUTO: 0.7 THOU/UL (ref 0.8–4.4)
LYMPHOCYTES # BLD AUTO: 0.8 THOU/UL (ref 0.8–4.4)
LYMPHOCYTES # BLD AUTO: 0.8 THOU/UL (ref 0.8–4.4)
LYMPHOCYTES # BLD AUTO: 0.9 THOU/UL (ref 0.8–4.4)
LYMPHOCYTES # BLD AUTO: 0.9 THOU/UL (ref 0.8–4.4)
LYMPHOCYTES # BLD AUTO: 1 THOU/UL (ref 0.8–4.4)
LYMPHOCYTES # BLD AUTO: 1.1 THOU/UL (ref 0.8–4.4)
LYMPHOCYTES # BLD AUTO: 1.3 THOU/UL (ref 0.8–4.4)
LYMPHOCYTES NFR BLD AUTO: 15 % (ref 20–40)
LYMPHOCYTES NFR BLD AUTO: 16 % (ref 20–40)
LYMPHOCYTES NFR BLD AUTO: 16 % (ref 20–40)
LYMPHOCYTES NFR BLD AUTO: 17 % (ref 20–40)
LYMPHOCYTES NFR BLD AUTO: 18 % (ref 20–40)
LYMPHOCYTES NFR BLD AUTO: 18 % (ref 20–40)
LYMPHOCYTES NFR BLD AUTO: 21 % (ref 20–40)
LYMPHOCYTES NFR BLD AUTO: 21 % (ref 20–40)
LYMPHOCYTES NFR BLD AUTO: 27 % (ref 20–40)
LYMPHOCYTES NFR BLD AUTO: 32 % (ref 20–40)
LYMPHOCYTES NFR BLD AUTO: 9 % (ref 20–40)
MAGNESIUM SERPL-MCNC: 1.8 MG/DL (ref 1.8–2.6)
MAGNESIUM SERPL-MCNC: 1.8 MG/DL (ref 1.8–2.6)
MAGNESIUM SERPL-MCNC: 1.9 MG/DL (ref 1.8–2.6)
MCH RBC QN AUTO: 25.8 PG (ref 27–34)
MCH RBC QN AUTO: 26.3 PG (ref 27–34)
MCH RBC QN AUTO: 26.4 PG (ref 27–34)
MCH RBC QN AUTO: 27.2 PG (ref 27–34)
MCH RBC QN AUTO: 27.4 PG (ref 27–34)
MCH RBC QN AUTO: 27.6 PG (ref 27–34)
MCH RBC QN AUTO: 30.2 PG (ref 27–34)
MCH RBC QN AUTO: 30.4 PG (ref 27–34)
MCH RBC QN AUTO: 30.9 PG (ref 27–34)
MCH RBC QN AUTO: 31.5 PG (ref 27–34)
MCH RBC QN AUTO: 32.1 PG (ref 27–34)
MCHC RBC AUTO-ENTMCNC: 30.4 G/DL (ref 32–36)
MCHC RBC AUTO-ENTMCNC: 30.4 G/DL (ref 32–36)
MCHC RBC AUTO-ENTMCNC: 30.7 G/DL (ref 32–36)
MCHC RBC AUTO-ENTMCNC: 30.7 G/DL (ref 32–36)
MCHC RBC AUTO-ENTMCNC: 30.8 G/DL (ref 32–36)
MCHC RBC AUTO-ENTMCNC: 30.8 G/DL (ref 32–36)
MCHC RBC AUTO-ENTMCNC: 31.2 G/DL (ref 32–36)
MCHC RBC AUTO-ENTMCNC: 31.4 G/DL (ref 32–36)
MCHC RBC AUTO-ENTMCNC: 31.9 G/DL (ref 32–36)
MCHC RBC AUTO-ENTMCNC: 32.4 G/DL (ref 32–36)
MCHC RBC AUTO-ENTMCNC: 32.5 G/DL (ref 32–36)
MCV RBC AUTO: 101 FL (ref 80–100)
MCV RBC AUTO: 85 FL (ref 80–100)
MCV RBC AUTO: 86 FL (ref 80–100)
MCV RBC AUTO: 86 FL (ref 80–100)
MCV RBC AUTO: 89 FL (ref 80–100)
MCV RBC AUTO: 95 FL (ref 80–100)
MCV RBC AUTO: 96 FL (ref 80–100)
MCV RBC AUTO: 97 FL (ref 80–100)
MCV RBC AUTO: 98 FL (ref 80–100)
MONOCYTES # BLD AUTO: 0.4 THOU/UL (ref 0–0.9)
MONOCYTES # BLD AUTO: 0.5 THOU/UL (ref 0–0.9)
MONOCYTES # BLD AUTO: 0.6 THOU/UL (ref 0–0.9)
MONOCYTES # BLD AUTO: 0.6 THOU/UL (ref 0–0.9)
MONOCYTES # BLD AUTO: 0.8 THOU/UL (ref 0–0.9)
MONOCYTES # BLD AUTO: 0.9 THOU/UL (ref 0–0.9)
MONOCYTES # BLD AUTO: 0.9 THOU/UL (ref 0–0.9)
MONOCYTES # BLD AUTO: 1.1 THOU/UL (ref 0–0.9)
MONOCYTES NFR BLD AUTO: 10 % (ref 2–10)
MONOCYTES NFR BLD AUTO: 12 % (ref 2–10)
MONOCYTES NFR BLD AUTO: 12 % (ref 2–10)
MONOCYTES NFR BLD AUTO: 13 % (ref 2–10)
MONOCYTES NFR BLD AUTO: 14 % (ref 2–10)
MONOCYTES NFR BLD AUTO: 14 % (ref 2–10)
MONOCYTES NFR BLD AUTO: 16 % (ref 2–10)
MONOCYTES NFR BLD AUTO: 17 % (ref 2–10)
MONOCYTES NFR BLD AUTO: 17 % (ref 2–10)
MONOCYTES NFR BLD AUTO: 8 % (ref 2–10)
MONOCYTES NFR BLD AUTO: 9 % (ref 2–10)
NEUTROPHILS # BLD AUTO: 1.8 THOU/UL (ref 2–7.7)
NEUTROPHILS # BLD AUTO: 1.9 THOU/UL (ref 2–7.7)
NEUTROPHILS # BLD AUTO: 2.1 THOU/UL (ref 2–7.7)
NEUTROPHILS # BLD AUTO: 2.2 THOU/UL (ref 2–7.7)
NEUTROPHILS # BLD AUTO: 3.4 THOU/UL (ref 2–7.7)
NEUTROPHILS # BLD AUTO: 3.6 THOU/UL (ref 2–7.7)
NEUTROPHILS # BLD AUTO: 3.7 THOU/UL (ref 2–7.7)
NEUTROPHILS # BLD AUTO: 3.8 THOU/UL (ref 2–7.7)
NEUTROPHILS # BLD AUTO: 5.3 THOU/UL (ref 2–7.7)
NEUTROPHILS # BLD AUTO: 6.4 THOU/UL (ref 2–7.7)
NEUTROPHILS NFR BLD AUTO: 50 % (ref 50–70)
NEUTROPHILS NFR BLD AUTO: 60 % (ref 50–70)
NEUTROPHILS NFR BLD AUTO: 60 % (ref 50–70)
NEUTROPHILS NFR BLD AUTO: 63 % (ref 50–70)
NEUTROPHILS NFR BLD AUTO: 64 % (ref 50–70)
NEUTROPHILS NFR BLD AUTO: 67 % (ref 50–70)
NEUTROPHILS NFR BLD AUTO: 67 % (ref 50–70)
NEUTROPHILS NFR BLD AUTO: 69 % (ref 50–70)
NEUTROPHILS NFR BLD AUTO: 74 % (ref 50–70)
NEUTROPHILS NFR BLD AUTO: 79 % (ref 50–70)
PATH REPORT.ADDENDUM SPEC: ABNORMAL
PATH REPORT.ADDENDUM SPEC: ABNORMAL
PATH REPORT.COMMENTS IMP SPEC: ABNORMAL
PATH REPORT.FINAL DX SPEC: ABNORMAL
PATH REPORT.MICROSCOPIC SPEC OTHER STN: ABNORMAL
PATH REPORT.RELEVANT HX SPEC: ABNORMAL
PLAT MORPH BLD: ABNORMAL
PLAT MORPH BLD: ABNORMAL
PLAT MORPH BLD: NORMAL
PLATELET # BLD AUTO: 107 THOU/UL (ref 140–440)
PLATELET # BLD AUTO: 152 THOU/UL (ref 140–440)
PLATELET # BLD AUTO: 166 THOU/UL (ref 140–440)
PLATELET # BLD AUTO: 168 THOU/UL (ref 140–440)
PLATELET # BLD AUTO: 168 THOU/UL (ref 140–440)
PLATELET # BLD AUTO: 190 THOU/UL (ref 140–440)
PLATELET # BLD AUTO: 192 THOU/UL (ref 140–440)
PLATELET # BLD AUTO: 241 THOU/UL (ref 140–440)
PLATELET # BLD AUTO: 278 THOU/UL (ref 140–440)
PLATELET # BLD AUTO: 285 THOU/UL (ref 140–440)
PLATELET # BLD AUTO: 349 THOU/UL (ref 140–440)
PLATELET # BLD AUTO: 444 THOU/UL (ref 140–440)
PMV BLD AUTO: 10 FL (ref 8.5–12.5)
PMV BLD AUTO: 10.1 FL (ref 8.5–12.5)
PMV BLD AUTO: 8.9 FL (ref 8.5–12.5)
PMV BLD AUTO: 8.9 FL (ref 8.5–12.5)
PMV BLD AUTO: 9 FL (ref 8.5–12.5)
PMV BLD AUTO: 9 FL (ref 8.5–12.5)
PMV BLD AUTO: 9.4 FL (ref 8.5–12.5)
PMV BLD AUTO: 9.4 FL (ref 8.5–12.5)
PMV BLD AUTO: 9.6 FL (ref 8.5–12.5)
PMV BLD AUTO: 9.8 FL (ref 8.5–12.5)
PMV BLD AUTO: 9.9 FL (ref 8.5–12.5)
POLYCHROMASIA BLD QL SMEAR: ABNORMAL
POTASSIUM BLD-SCNC: 2.5 MMOL/L (ref 3.5–5)
POTASSIUM BLD-SCNC: 3 MMOL/L (ref 3.5–5)
POTASSIUM BLD-SCNC: 3.9 MMOL/L (ref 3.5–5)
POTASSIUM BLD-SCNC: 3.9 MMOL/L (ref 3.5–5)
POTASSIUM BLD-SCNC: 4.1 MMOL/L (ref 3.5–5)
POTASSIUM BLD-SCNC: 4.2 MMOL/L (ref 3.5–5)
POTASSIUM BLD-SCNC: 4.3 MMOL/L (ref 3.5–5)
POTASSIUM BLD-SCNC: 4.4 MMOL/L (ref 3.5–5)
PROT SERPL-MCNC: 5.5 G/DL (ref 6–8)
PROT SERPL-MCNC: 5.8 G/DL (ref 6–8)
PROT SERPL-MCNC: 5.9 G/DL (ref 6–8)
PROT SERPL-MCNC: 6 G/DL (ref 6–8)
PROT SERPL-MCNC: 6.2 G/DL (ref 6–8)
PROT SERPL-MCNC: 6.3 G/DL (ref 6–8)
PROT SERPL-MCNC: 6.5 G/DL (ref 6–8)
PROT SERPL-MCNC: 6.5 G/DL (ref 6–8)
PROT SERPL-MCNC: 6.6 G/DL (ref 6–8)
RBC # BLD AUTO: 3.13 MILL/UL (ref 4.4–6.2)
RBC # BLD AUTO: 3.14 MILL/UL (ref 4.4–6.2)
RBC # BLD AUTO: 3.23 MILL/UL (ref 4.4–6.2)
RBC # BLD AUTO: 3.25 MILL/UL (ref 4.4–6.2)
RBC # BLD AUTO: 3.54 MILL/UL (ref 4.4–6.2)
RBC # BLD AUTO: 3.55 MILL/UL (ref 4.4–6.2)
RBC # BLD AUTO: 3.59 MILL/UL (ref 4.4–6.2)
RBC # BLD AUTO: 3.69 MILL/UL (ref 4.4–6.2)
RBC # BLD AUTO: 3.79 MILL/UL (ref 4.4–6.2)
RBC # BLD AUTO: 3.87 MILL/UL (ref 4.4–6.2)
RBC # BLD AUTO: 3.92 MILL/UL (ref 4.4–6.2)
RESULT FLAG (HE HISTORICAL CONVERSION): ABNORMAL
SODIUM SERPL-SCNC: 136 MMOL/L (ref 136–145)
SODIUM SERPL-SCNC: 137 MMOL/L (ref 136–145)
SODIUM SERPL-SCNC: 138 MMOL/L (ref 136–145)
SODIUM SERPL-SCNC: 139 MMOL/L (ref 136–145)
SODIUM SERPL-SCNC: 140 MMOL/L (ref 136–145)
SODIUM SERPL-SCNC: 141 MMOL/L (ref 136–145)
SODIUM SERPL-SCNC: 141 MMOL/L (ref 136–145)
SODIUM SERPL-SCNC: 142 MMOL/L (ref 136–145)
SODIUM SERPL-SCNC: 143 MMOL/L (ref 136–145)
SPECIMEN DESCRIPTION: ABNORMAL
TIBC SERPL-MCNC: 190 UG/DL (ref 313–563)
TIBC SERPL-MCNC: 345 UG/DL (ref 313–563)
TOTAL NEUTROPHILS-ABS(DIFF): 4.4 THOU/UL (ref 2–7.7)
TOTAL NEUTROPHILS-REL(DIFF): 76 % (ref 50–70)
TRANSFERRIN SERPL-MCNC: 152 MG/DL (ref 212–360)
TRANSFERRIN SERPL-MCNC: 276 MG/DL (ref 212–360)
WBC: 3.1 THOU/UL (ref 4–11)
WBC: 3.3 THOU/UL (ref 4–11)
WBC: 3.6 THOU/UL (ref 4–11)
WBC: 3.6 THOU/UL (ref 4–11)
WBC: 5 THOU/UL (ref 4–11)
WBC: 5.1 THOU/UL (ref 4–11)
WBC: 5.6 THOU/UL (ref 4–11)
WBC: 5.7 THOU/UL (ref 4–11)
WBC: 5.8 THOU/UL (ref 4–11)
WBC: 7.9 THOU/UL (ref 4–11)
WBC: 8.2 THOU/UL (ref 4–11)

## 2019-01-01 RX ORDER — HYDROMORPHONE HYDROCHLORIDE 1 MG/ML
2 SOLUTION ORAL EVERY 4 HOURS PRN
Qty: 80 ML | Refills: 0 | Status: SHIPPED | OUTPATIENT
Start: 2019-01-01

## 2019-01-01 RX ORDER — SITAGLIPTIN 100 MG/1
TABLET, FILM COATED ORAL
Qty: 30 TABLET | Refills: 0 | Status: SHIPPED | OUTPATIENT
Start: 2019-01-01

## 2019-01-01 RX ORDER — INSULIN GLARGINE 100 [IU]/ML
INJECTION, SOLUTION SUBCUTANEOUS
Refills: 0 | Status: SHIPPED | COMMUNITY
Start: 2019-01-01

## 2019-01-01 RX ORDER — GLIMEPIRIDE 4 MG/1
4 TABLET ORAL DAILY
Status: SHIPPED | COMMUNITY
Start: 2019-01-01

## 2019-01-01 RX ORDER — ZOLPIDEM TARTRATE 6.25 MG/1
6.25 TABLET, FILM COATED, EXTENDED RELEASE ORAL
Qty: 20 TABLET | Refills: 0 | Status: SHIPPED | OUTPATIENT
Start: 2019-01-01

## 2019-01-01 RX ORDER — HYDROMORPHONE HYDROCHLORIDE 2 MG/1
2 TABLET ORAL EVERY 6 HOURS PRN
Qty: 45 TABLET | Refills: 0 | Status: SHIPPED | OUTPATIENT
Start: 2019-01-01

## 2019-01-01 RX ORDER — ASCORBIC ACID 500 MG
500 TABLET ORAL DAILY
Status: SHIPPED | COMMUNITY
Start: 2019-01-01

## 2019-01-01 RX ORDER — PANCRELIPASE 30000; 6000; 19000 [USP'U]/1; [USP'U]/1; [USP'U]/1
CAPSULE, DELAYED RELEASE PELLETS ORAL
Qty: 60 CAPSULE | Refills: 0 | Status: SHIPPED | OUTPATIENT
Start: 2019-01-01

## 2019-01-01 RX ORDER — POTASSIUM CHLORIDE 1500 MG/1
20 TABLET, EXTENDED RELEASE ORAL 2 TIMES DAILY
Qty: 30 TABLET | Refills: 0 | Status: SHIPPED | OUTPATIENT
Start: 2019-01-01

## 2019-01-01 ASSESSMENT — MIFFLIN-ST. JEOR
SCORE: 1577.79
SCORE: 1583.68
SCORE: 1544.22
SCORE: 1561.91
SCORE: 1399.07
SCORE: 1403.61

## 2019-01-03 ENCOUNTER — OFFICE VISIT - HEALTHEAST (OUTPATIENT)
Dept: ONCOLOGY | Facility: CLINIC | Age: 64
End: 2019-01-03

## 2019-01-03 ENCOUNTER — INFUSION - HEALTHEAST (OUTPATIENT)
Dept: INFUSION THERAPY | Facility: CLINIC | Age: 64
End: 2019-01-03

## 2019-01-03 ENCOUNTER — AMBULATORY - HEALTHEAST (OUTPATIENT)
Dept: INFUSION THERAPY | Facility: CLINIC | Age: 64
End: 2019-01-03

## 2019-01-03 DIAGNOSIS — C25.0 MALIGNANT NEOPLASM OF HEAD OF PANCREAS (H): ICD-10-CM

## 2019-01-03 DIAGNOSIS — Z51.11 ENCOUNTER FOR ANTINEOPLASTIC CHEMOTHERAPY: ICD-10-CM

## 2019-01-03 DIAGNOSIS — C25.0 CA HEAD OF PANCREAS (H): ICD-10-CM

## 2019-01-03 LAB
ALBUMIN SERPL-MCNC: 3.5 G/DL (ref 3.5–5)
ALP SERPL-CCNC: 107 U/L (ref 45–120)
ALT SERPL W P-5'-P-CCNC: 23 U/L (ref 0–45)
ANION GAP SERPL CALCULATED.3IONS-SCNC: 8 MMOL/L (ref 5–18)
AST SERPL W P-5'-P-CCNC: 16 U/L (ref 0–40)
BASOPHILS # BLD AUTO: 0 THOU/UL (ref 0–0.2)
BASOPHILS NFR BLD AUTO: 0 % (ref 0–2)
BILIRUB SERPL-MCNC: 0.3 MG/DL (ref 0–1)
BUN SERPL-MCNC: 18 MG/DL (ref 8–22)
CALCIUM SERPL-MCNC: 8.9 MG/DL (ref 8.5–10.5)
CHLORIDE BLD-SCNC: 104 MMOL/L (ref 98–107)
CO2 SERPL-SCNC: 26 MMOL/L (ref 22–31)
CREAT SERPL-MCNC: 0.74 MG/DL (ref 0.7–1.3)
EOSINOPHIL # BLD AUTO: 0.1 THOU/UL (ref 0–0.4)
EOSINOPHIL NFR BLD AUTO: 1 % (ref 0–6)
ERYTHROCYTE [DISTWIDTH] IN BLOOD BY AUTOMATED COUNT: 13.8 % (ref 11–14.5)
GFR SERPL CREATININE-BSD FRML MDRD: >60 ML/MIN/1.73M2
GLUCOSE BLD-MCNC: 158 MG/DL (ref 70–125)
HCT VFR BLD AUTO: 36.2 % (ref 40–54)
HGB BLD-MCNC: 11.6 G/DL (ref 14–18)
LYMPHOCYTES # BLD AUTO: 1 THOU/UL (ref 0.8–4.4)
LYMPHOCYTES NFR BLD AUTO: 19 % (ref 20–40)
MAGNESIUM SERPL-MCNC: 1.9 MG/DL (ref 1.8–2.6)
MCH RBC QN AUTO: 32.2 PG (ref 27–34)
MCHC RBC AUTO-ENTMCNC: 32 G/DL (ref 32–36)
MCV RBC AUTO: 101 FL (ref 80–100)
MONOCYTES # BLD AUTO: 0.7 THOU/UL (ref 0–0.9)
MONOCYTES NFR BLD AUTO: 15 % (ref 2–10)
NEUTROPHILS # BLD AUTO: 3.3 THOU/UL (ref 2–7.7)
NEUTROPHILS NFR BLD AUTO: 65 % (ref 50–70)
PLATELET # BLD AUTO: 191 THOU/UL (ref 140–440)
PMV BLD AUTO: 9.6 FL (ref 8.5–12.5)
POTASSIUM BLD-SCNC: 4.4 MMOL/L (ref 3.5–5)
PROT SERPL-MCNC: 6 G/DL (ref 6–8)
RBC # BLD AUTO: 3.6 MILL/UL (ref 4.4–6.2)
SODIUM SERPL-SCNC: 138 MMOL/L (ref 136–145)
WBC: 5.1 THOU/UL (ref 4–11)

## 2019-01-05 ENCOUNTER — INFUSION - HEALTHEAST (OUTPATIENT)
Dept: INFUSION THERAPY | Facility: CLINIC | Age: 64
End: 2019-01-05

## 2019-01-05 DIAGNOSIS — Z51.11 ENCOUNTER FOR ANTINEOPLASTIC CHEMOTHERAPY: ICD-10-CM

## 2019-01-05 DIAGNOSIS — C25.0 CA HEAD OF PANCREAS (H): ICD-10-CM

## 2019-01-05 DIAGNOSIS — C25.0 MALIGNANT NEOPLASM OF HEAD OF PANCREAS (H): ICD-10-CM

## 2019-01-08 ENCOUNTER — COMMUNICATION - HEALTHEAST (OUTPATIENT)
Dept: ONCOLOGY | Facility: CLINIC | Age: 64
End: 2019-01-08

## 2020-04-30 ENCOUNTER — AMBULATORY - HEALTHEAST (OUTPATIENT)
Dept: ONCOLOGY | Facility: CLINIC | Age: 65
End: 2020-04-30

## 2021-05-26 VITALS
OXYGEN SATURATION: 93 % | SYSTOLIC BLOOD PRESSURE: 137 MMHG | DIASTOLIC BLOOD PRESSURE: 89 MMHG | HEART RATE: 107 BPM | TEMPERATURE: 97.8 F

## 2021-05-26 VITALS
HEART RATE: 70 BPM | OXYGEN SATURATION: 97 % | SYSTOLIC BLOOD PRESSURE: 157 MMHG | TEMPERATURE: 98.2 F | DIASTOLIC BLOOD PRESSURE: 94 MMHG

## 2021-05-27 NOTE — ANESTHESIA POSTPROCEDURE EVALUATION
Patient: Gurpreet Gu  CHOLECYSTECTOMY, LAPAROSCOPIC  Anesthesia type: general    Patient location: PACU  Last vitals:   Vitals:    03/25/19 2000   BP: (!) 149/92   Pulse: (!) 112   Resp: (!) 30   Temp:    SpO2: 94%     Post vital signs: stable  Level of consciousness: awake and responds to simple questions  Post-anesthesia pain: pain controlled  Post-anesthesia nausea and vomiting: no  Pulmonary: unassisted, return to baseline  Cardiovascular: stable and blood pressure at baseline  Hydration: adequate  Anesthetic events: no    QCDR Measures:  ASA# 11 - Lenora-op Cardiac Arrest: ASA11B - Patient did NOT experience unanticipated cardiac arrest  ASA# 12 - Lenora-op Mortality Rate: ASA12B - Patient did NOT die  ASA# 13 - PACU Re-Intubation Rate: ASA13B - Patient did NOT require a new airway mgmt  ASA# 10 - Composite Anes Safety: ASA10A - No serious adverse event    Additional Notes:

## 2021-05-27 NOTE — TELEPHONE ENCOUNTER
Left message for patient to return call.   Also sent My Chart message. Need to ask how patient is using medication hydromorphone; how many, how often and for what reason?  Almita Hogan RN

## 2021-05-27 NOTE — ANESTHESIA PREPROCEDURE EVALUATION
Anesthesia Evaluation      Patient summary reviewed   No history of anesthetic complications     Airway   Mallampati: II  Neck ROM: limited   Pulmonary - normal exam   (+) sleep apnea,                          Cardiovascular - normal exam  (+) hypertension, , hypercholesterolemia,      Neuro/Psych      Endo/Other    (+) diabetes mellitus type 2,      GI/Hepatic/Renal    (+)   chronic renal disease,           Dental    (+) poor dentition                         Anesthesia Plan  Planned anesthetic: general endotracheal  1/2 MAC Inhaled anesthetic: 1/2 MAC Propofol, Zofran, Decadron  Ketamine  +/- TAP d/w pt if converted to open  ASA 3   Induction: intravenous   Anesthetic plan and risks discussed with: patient  Anesthesia plan special considerations: rapid sequence induction, antiemetics,   Post-op plan: routine recovery

## 2021-05-27 NOTE — ANESTHESIA CARE TRANSFER NOTE
Last vitals:   Vitals:    03/25/19 1933   BP: 173/90   Pulse: (!) 110   Resp: 24   Temp: 36.7  C (98.1  F)   SpO2: 94%     Patient's level of consciousness is drowsy  Spontaneous respirations: yes  Maintains airway independently: yes  Dentition unchanged: yes  Oropharynx: oropharynx clear of all foreign objects    QCDR Measures:  ASA# 20 - Surgical Safety Checklist: WHO surgical safety checklist completed prior to induction    PQRS# 430 - Adult PONV Prevention: 4558F - Pt received => 2 anti-emetic agents (different classes) preop & intraop  ASA# 8 - Peds PONV Prevention: NA - Not pediatric patient, not GA or 2 or more risk factors NOT present  PQRS# 424 - Lenora-op Temp Management: 4559F - At least one body temp DOCUMENTED => 35.5C or 95.9F within required timeframe  PQRS# 426 - PACU Transfer Protocol: - Transfer of care checklist used  ASA# 14 - Acute Post-op Pain: ASA14B - Patient did NOT experience pain >= 7 out of 10

## 2021-05-30 NOTE — PROGRESS NOTES
07/01/19 0945   Provider Pre-Sedation Assessment   Difficult Airway?  No   Plan for Sedation Moderate   Assessment reviewed by proceduralist Yes   Update H&P Completed Updated H&P in Update H&P Note section

## 2021-05-30 NOTE — TELEPHONE ENCOUNTER
Call placed to Gurpreet to check in and see if he had made any decisions after his appointment with Dr Quick. Left my call back number and asked that he call me back at his earliest convenience.

## 2021-05-30 NOTE — PROGRESS NOTES
[facility] Hematology and Oncology Progress Note    Patient: Gurpreet Gu  MRN: [id number]  Date of Service: 7/20/2019      Reason for Visit    Chief Complaint   Patient presents with     HE Cancer     Pancreatic Cancer       Assessment and Plan  Cancer Staging  Malignant neoplasm of head of pancreas (H)  Staging form: Exocrine Pancreas, AJCC 8th Edition  - Clinical stage from 7/24/2018: Stage IB (cT2, cN0, cM0) - Signed by Xochitl Quick MD on 7/24/2018      ECOG Performance   ECOG Performance Status: 0     Distress Assessment  Distress Assessment Score: 5(visit today)    Pain  Currently in Pain: No/denies  Pain Score (Initial OR Reassessment): No/Denies Pain  Location: intermittent abd, back    #.  Progressive to metastatic pancreatic cancer of the head of the pancreas.  Initially diagnosed at borderline resectable pancreatic cancer (3.8 x 4.1 cm by CT) pancreatic head mass in May 2018.   Completed 12 cycles of neoadjuvant chemotherapy with FOLFIRINOX in January 2019.  Upon serial evaluation prior to Whipple's surgery, a small 6 mm hypodense lesion in the right hepatic lobe initially evident in July 2018 has gradually enlarged to 1.9 cm along with gradually enlarging pancreatic head mass.  He was amendable for image guided liver biopsy and confirmed metastatic pancreatic cancer.  The Whipple surgery was canceled and he is returned to medical oncology.   I explained to him that knowing the metastatic pancreatic cancer before the Whipple surgery was actually a good thing for him as the surgery will not change the overall outcome.  At the time we know about a hypodense lesion in the right lobe of the liver last year was not amendable for biopsy and it appeared to be stable during the chemotherapy.  He voiced understanding of the rationale of the treatment plan.   I discussed about overall prognosis of metastatic pancreatic cancer.  I also discussed about restarting chemotherapy with palliative intent would be  reasonable if he desires.   I discussed about palliative chemotherapy with gemcitabine alone or gemcitabine with Abraxane with potential benefit of few months of progression free and overall survival.  He want to think about whether it is worth of going through this treatment.Gurpreet has a lot of thoughts about not restarting chemotherapy.  He wants to know about how he is an alive going to be, her pain management.  At the same time, he does not want to see palliative or hospice either.  He is trying to digest these information.    I explained to him that I will add additional testing for MMR and NTRK gene fusion profile to see whether he could be candidate for immunotherapy or other targeted therapy.  I also recommended him to see genetic counselor to do BRCA testing can also help us chemotherapy choice.  He is willing to do blood test but he does not want to see another provider.   After an extensive and long discussion, he needs more time to think it over.  He will call me back with his decision in a week.  I explained to him that he may want to be open about his help with his daughter.     #.  Abdominal pain and back pain-likely cancer related   Reported intermittent.  However clinically, he grimaces a lot during the encounter from pain.  I refilled Dilaudid for him to use as needed.    #.  Chronic diarrhea   Likely from pancreatic insufficiency and post cholecystectomy.  He is currently on Lomotil, Creon.  He is advised to keep up with fluid intakes.    Problem List    1. CA head of pancreas (H)  DNR (Do Not Resuscitate, Includes DNI)    Pathology Additional Testing    HYDROmorphone (DILAUDID) 2 MG tablet    DISCONTINUED: HYDROmorphone (DILAUDID) 2 MG tablet    DISCONTINUED: HYDROmorphone (DILAUDID) 2 MG tablet    Added automatically from request for surgery [id number]      ______________________________________________________________________________  Diagnosis  May 2018- new ill-defined hypodensity in the  pancreatic head mass measuring about 3.5 cm by CT scan upon evaluation of left lower quadrant pain.  6/7/2018-FNA of the pancreatic head mass was positive for adenocarcinoma.  6/22/2018: PET/CT scan show ill-defined mass along the upper anterior margin of the pancreas adjacent to the duodenum with moderate FDG avidity, SUV max 4.7.  No additional sites of suspicious FDG avidity.    Treatment to date  7/24/2018 -1/23/2019: Completed neoadjuvant chemotherapy with FOLFIRINOX.   -Dose reduced in first cycle due to hyperbilirubinemia :5-FU to 50% (1400 mg/m  from 2800 mg/m ), irinotecan dose to 135 mg/m  from 180 mg/m .  Increased to full dose starting cycle #2 till C#6 of chemotherapy.    -Decreased irinotecan dose to 162 mg/m  from 180 mg/m  due to persistent diarrhea (watery about 12 times per day) throughout the cycle, and decreased oxaliplatin to 65 mg/m  due to persistent and worsening neuropathy in hands> feet and intolerable cold sensitivity starting cycle #7.  Further dose reduction of irinotecan to 150 mg/m  from cycle # 9-12.    History of Present Illness    Therapy presents today for follow-up.  The Whipple surgery was canceled which would have been 2 days ago, after biopsy of the liver lesion confirmed metastatic pancreatic cancer.  He was quite sad but he was prepared as well.  He has lost about 25 pounds since March 2019 when I saw him last.  Since last visit, he has gangrenous cholecystitis and recover from this.  Since then, he has worsening watery diarrhea more than 12 times a day.  He has some pain medication he took after liver biopsy.  Currently he has some discomfort in his abdomen but he denies significant amount or constant pain.  Described as intermittent abdominal pain and back pain.      He has not been quite open with his daughter about his new diagnosis.     Pain Status  Currently in Pain: No/denies    Review of Systems    Constitutional  Constitutional (WDL): Exceptions to WDL  Fatigue:  None(trouble sleeping)  Fever: None  Chills: None  Weight Gain: None  Weight Loss: 10 - <20% from baseline, nutritional support indicated(25# 3/31/19)  Neurosensory  Neurosensory (WDL): Exceptions to WDL  Peripheral Motor Neuropathy: Asymptomatic, clinical or diagnostic observations only, intervention not indicated  Ataxia: Asymptomatic, clinical or diagnostic observations only, intervention not indicated  Peripheral Sensory Neuropathy: Asymptomatic, loss of deep tendon reflexes or paresthesia(feet)  Confusion: None  Syncope: None  Eye   Eye Disorder (WDL): All eye disorder elements are within defined limits  Ear  Ear Disorder (WDL): All ear disorder elements are within defined limits  Cardiovascular  Cardiovascular (WDL): Exceptions to WDL(tachy)  Palpitations: None  Edema: No  Phlebitis: None  Superficial thrombophlebitis: None  Pulmonary  Respiratory (WDL): Exceptions to WDL  Cough: None  Dyspnea: Shortness of breath with moderate exertion(occ)  Hypoxia: None  Gastrointestinal  Gastrointestinal (WDL): Exceptions to WDL(intermittent abd pain)  Anorexia: None  Constipation: None  Diarrhea: Increase of greater than or equal to 7 stools per day over baseline, incontinence, hospitalization indicated, severe increase in ostomy output compared to baseline, limiting self care ADL(loose)  Dysphagia: None  Esophagitis: Asymptomatic, clinical or diagnostic observations only, intervention not indicated(occ)  Nausea: Loss of appetite without alteration in eating habits  Pharyngitis: None  Vomiting: None  Dysgeusia: None  Dry Mouth: None  Genitourinary  Genitourinary (WDL): Exceptions to WDL  Urinary Frequency: Present  Urinary Retention: None  Lymphatic  Lymph (WDL): All lymph disorder elements are within defined limits  Musculoskeletal and Connective Tissue  Musculoskeletal and Connetive Tissue Disorders (WDL): Exceptions to WDL  Arthralgia: Mild pain(intermittent back)  Bone Pain: None  Muscle Weakness : Symptomatic,  perceived by patient but not evident on physical exam  Myalgia: Mild pain(intermittent back)  Integumentary  Integumentary (WDL): All integumentary elements are within defined limits  Patient Coping  Patient Coping: Open/discussion  Distress Assessment  Distress Assessment Score: 5(visit today)  Accompanied by  Accompanied by: Alone  Oral Chemo Adherence       Past History  Past Medical History:   Diagnosis Date     Diabetes mellitus (H)      Hypercholesteremia      Hypertension      Sleep apnea        Physical Exam    Recent Vitals 7/17/2019   Weight 149 lbs 6 oz   BSA (m2) 1.83 m2   /58   Pulse 109   Temp 98.7   Temp src 1   SpO2 99   Some recent data might be hidden     General: alert, awake, not in acute distress.  HEENT: Head: Normal, normocephalic, atraumatic.  Eye: Normal external eye, conjunctiva, lids cornea, DASHAWN.  Ears:  Non-tender.  Nose: Normal external nose, mucus membranes and septum.  Pharynx: Dental Hygiene adequate. Normal buccal mucosa. Normal pharynx.  Neck / Thyroid: Supple, no masses, nodes, nodules or enlargement.  Lymphatics: No abnormally enlarged lymph nodes.  Chest: Normal chest wall and respirations. Clear to auscultation.  Heart: S1 S2 RRR, no murmur.   Abdomen: abdomen is soft without significant tenderness, masses, organomegaly or guarding.  No clinically evident of ascites  Extremities: normal strength, tone, and muscle mass  Skin: normal. no rash or abnormalities.  Thinning of head hair.  CNS: non focal.    Lab Results    No results found for this or any previous visit (from the past 168 hour(s)).    Imaging    Image Guided Liver Biopsy    Result Date: 7/1/2019  EXAM: 1. PERCUTANEOUS BIOPSY LIVER LESION 2. ULTRASOUND GUIDANCE 3. CONSCIOUS SEDATION LOCATION: [facility] DATE/TIME: 7/1/2019 11:11 AM INDICATION: Hepatic lesion with FDG activity on PET/CT. PROCEDURE: Informed consent obtained. Time out performed. The site was prepped and draped in sterile fashion. 10 mL of  1 percent lidocaine was infused into the local soft tissues. Using standard technique and under direct ultrasound guidance, a 18 gauge biopsy needle was used to make three core biopsies. Tissue was submitted to Pathology. The patient tolerated the procedure well. No complications. RADIOLOGIC SUPERVISION AND INTERPRETATION: Liver lesion seen. ULTRASOUND GUIDANCE: Images demonstrate the biopsy needle in satisfactory position. SEDATION: Versed 3 mg. Fentanyl 150 mcg. The procedure was performed with administration intravenous conscious sedation with appropriate preoperative, intraoperative, and postoperative evaluation. 45 minutes of supervised face to face conscious sedation time was provided by a radiology nurse under my direct supervision.     CONCLUSION: Status post US-guided biopsy liver lesion. Reference CPT Code: 16399, 78461, 24644, 99153 x 2.    Ct Abdomen Pelvis Without Oral With Iv Contrast    Result Date: 7/1/2019  EXAM: CT ABDOMEN PELVIS WO ORAL W IV CONTRAST LOCATION:  DATE/TIME: 7/1/2019 1:49 PM INDICATION: Abdominal pain abdominal pain after liver biopsy. History of pancreatic cancer and common bile duct stent placement. COMPARISON: Ultrasound-guided liver biopsy today. Ultrasound of the liver 06/26/2019. PET CT 06/18/2019. CT abdomen and pelvis 05/21/2019. TECHNIQUE: Helical enhanced thin-section CT scan of the abdomen and pelvis was performed following injection of IV contrast. Multiplanar reformats were obtained. Dose reduction techniques were used. CONTRAST: Iohexol (Omni) 100 mL FINDINGS: LUNG BASES: Negative. ABDOMEN: Compared to the prior contrast-enhanced CT abdomen of 05/21/2019, little change in the known hypodense mass of the pancreatic head which is estimated to measure about 3.6 x 2.7 cm today. The common duct stent remains in satisfactory position and  unchanged from previous. Moderate biliary dilatation despite the stent is similar to the prior study. Mild dilatation of  the main pancreatic duct to 5 mm has progressed from 3.5 mm on prior. Body and tail of the pancreas are chronically moderately atrophic. A hypodense lesion of the central portion of hepatic segment VI has enlarged to 1.9 cm from 7 mm on 05/21/2019 (e.g image 28 of series 2). No new hepatic lesions. Multiple prominent lymph nodes of the rell hepatis are unchanged which measure up to 2.2 x 1.6 cm. No intra or extrahepatic fluid collection. Portal vein is patent. Post gastrojejunostomy and cholecystectomy. Spleen, adrenal glands, and kidneys are negative. PELVIS: Colonic diverticulosis. Normal appendix. MUSCULOSKELETAL: Negative.     CONCLUSION: 1.  No complication after liver biopsy. 2.  Little change in the known 3.6 cm pancreatic head neoplasm. 3.  Enlarging right hepatic lesion consistent with a metastasis. 4.  Stable lymphadenopathy of the rell hepatis. 5.  Stable moderate biliary dilatation. Mild progression in mild pancreatic ductal dilatation. Common bile duct stent in satisfactory and unchanged position. I discussed the findings with DANIAL Vegas of the ER at 2:30 PM on 07/01/2019.       Signed by: Xochitl Quick MD

## 2021-05-30 NOTE — TELEPHONE ENCOUNTER
Spoke with Gurpreet.  He would like to proceed with palliative intent chemotherapy.  I discussed about starting with single agent gemcitabine and he agreed.  Treatment plan will be and placed.  A wellness clinic staff will call him for appointment date and time.    He still have a bout of diarrhea about 10 times a day.  I advised him to increase Creon 2 tablets with each meal and 1 tablet with each snacks.    Reported his pain has improved and manageable with pain medication.    Xochitl Quick MD

## 2021-05-30 NOTE — H&P
H&P documented within 30 days (by Dr. John Lemus on 6/27/2019). I have performed and assessment and examined the patient, as necessary, to update the patient's current status that may have changed since the prior History and Physical.

## 2021-05-30 NOTE — PROGRESS NOTES
Patient is ready for discharge. Patient call light is buzzing. Enter the patients room and friend stated the patient did not feel well. Patient complained of headache and abdomen pain. Patient skin is diaphoretic. Checked patient blood glucose. Patient wants to go into the bathroom, inform patient he should use a bedpan or urinal patient refuse and start walking to the bathroom. Nurse is behind the patient. See vitals at time of episode and blood glucose. Patient reported having a small bowel movement and urinate. Paged Radiologist, Dr. Spencer is here to assess the patient. CT is ordered. Patient had emesis in the trash can brown liquid in color. Dr. Spencer is aware.

## 2021-05-31 NOTE — TELEPHONE ENCOUNTER
Called and left message for Gurpreet to call us regarding a low iron level result and to set up a possible iron infusion when he has his Friday appt with Ken Eid. Marjorie Mcginnis RN

## 2021-05-31 NOTE — PROGRESS NOTES
Pt amb into clinic for chemo and Feraheme infusion. Reviewed plan of care. Discussed purpose and potential side effects of Feraheme and po iron, ways to manage constipation, when to call, etc; hand-outs given. Per Ken, NP, pt to receive Feraheme x 2 doses and attempt 1 325 mg po iron tablet daily with po Vit C or orange juice. If pt tolerates well, he may increase to 2 tabs two times a day. Pt states understanding. Pt tolerated Gemzar & Feraheme infusions well. Report given to BESS Sheridan.

## 2021-05-31 NOTE — PROGRESS NOTES
Patient here today for labs, OV with Dr. Quick, and chemo for metastatic pancreatic CA/SERENITY Bang RN

## 2021-05-31 NOTE — PROGRESS NOTES
Patient ambulated into Infusion Care by himself. Patient is alert and oriented and PORT had been accessed in the [facility].Premeds administered and PPE donned. Chemotherapy double checked with Bonnie Pugh RN. Patient tolerated infusion of Gemzar without any problems. PORT flushed with Normal Saline and 6 cc Heparin and deaccessed. Dry 2 x 2 gauze taped over PORT site. All questions answered and patient was discharged home.

## 2021-05-31 NOTE — PROGRESS NOTES
Pt here today for treatment following MD visit. He tolerated infusion well and left clinic ambulatory and independent.

## 2021-05-31 NOTE — PROGRESS NOTES
[facility] Hematology and Oncology Progress Note    Patient: Gurpreet Gu  MRN: [id number]  Date of Service: 8/2/2019         Reason for Visit:    D1C1 palliative Gemzar chemotherapy.    Assessment and Plan:    1) Malignant neoplasm of head of pancreas (H)     Stage IB (cT2, cN0, cM0)     64 y.o.     2018, April - evaluation of left lower quadrant pain:    05/03/18 CT A&P - new ill-defined hypodensity in the pancreatic head, mass measuring about 3.5 cm.     06/07/2018 FNA of the pancreatic head mass - positive for adenocarcinoma.    06/22/2018 PET/CT - ill-defined mass along the upper anterior margin of the pancreas adjacent to the duodenum with moderate FDG avidity, SUV max 4.7.  No additional sites of suspicious FDG avidity.    Borderline resectable pancreatic cancer (3.8 x 4.1 cm by CT and 43 x 33 mm pancreatic head mass.    07/19/18 biliary drain placed by IR.    07/24/18 - 01/23/19 completed 12 cycles FOLFIRINOX (dose reduced 5-FU and irinotecan due to hyperbilirubinemia) + Neulasta.    C2 through C6 received standard dosing irinotecan and 5-FU.    C7 on - received dose reduced irinotecan due to persistent loose/diarrea stools.    C7, C9 - 12 received ~20% dose reduced oxaliplatin.    11/08/18 CT CAP - Pancreatic head mass has significantly decreased in size.  External biliary drain and internal biliary stent with decompression of the intrahepatic biliary system.  Stable small hypodense right hepatic lobe lesion. No other focal liver lesions.  No adenopathy within the chest, abdomen or pelvis.    02/12/19 CT CAP - The pancreatic head mass is ill-defined and has not increased in size from the comparison study. A small lesion in the medial right hepatic lobe is not appreciated on this study. No new lymphadenopathy in the chest, abdomen, or pelvis. No new pulmonary nodules.     03/24/19 CT A&P - Dilated gallbladder with gallbladder wall thickening, pericholecystic fluid/edema and increased intrahepatic bile duct  dilatation. Findings suspicious for septic cholangitis secondary to occluded internal metallic common bile duct stent. Ill-defined pancreatic head mass unchanged in appearance. There is slight increase in gastrohepatic and celiac lymph nodes as described above. Stable 4 mm hypodense right hepatic lesion.    03/25/19 laparoscopic cholecystectomy for gangrenous cholecystitis    05/21/19 CT A&P - new ill-defined hypodensity in the pancreatic head mass measuring about 3.5 cm by CT scan upon evaluation of left lower quadrant pain.    06/07/18 - FNA of the pancreatic head mass was positive for adenocarcinoma.    06/22/18 NM PET - ill-defined mass along the upper anterior margin of the pancreas adjacent to the duodenum with moderate FDG avidity, SUV max 4.7.  No additional sites of suspicious FDG avidity.    07/01/19 CT A&P - Little change in the known 3.6 cm pancreatic head neoplasm. Enlarging right hepatic lesion consistent with a metastasis. Stable lymphadenopathy of the rell hepatis. Stable moderate biliary dilatation. Mild progression in mild pancreatic ductal dilatation. Common bile duct stent in satisfactory and unchanged position.    Whipple surgery canceled after biopsy of the liver lesion confirmed metastatic pancreatic cancer.        Dr Quick recommended and patient opted to try palliative Gemzar chemotherapy.  MMR and NTRK gene fusion profile have been sent to see whether he could be candidate for immunotherapy or other targeted therapy.    08/02/19:    CBC - WBC, ANC and Plts WNL.  HgB drop @ 8.3.    LFTs - increasing LFTs. Bili WNL.  Elevated non-fasting BS.    Magnesium - pending.    CA19.9 - pending.    The patient presents in good spirits, looking and feeling quite fatigued.  He is afebrile.  His weight has decreased ~25 pounds since March, now fairly stable.  He has a good appetite.  Rare nausea and no vomiting.  since his laparoscopic surgery for gangrenous cholecystitis he has worsening watery diarrhea.   The dose of creon was recently increased which has helped.  He has not tried any imodium.  Chronic insomnia - requests Ambien.    Chronic diarrhea - likely from pancreatic insufficiency and post cholecystectomy.      Continue Creon, 2 tablets with each meal and 1 tablet with each snack.      May try imodium, 2 tabs with first episode diarrhea each day followed by 1 tab with each subsequent episode diarrhea each day.    /60 - .    Continue:    Lisinopril 10 mg daily    Norvasc 5 mg daily.    Follows with PCP    Reviewed goals and risks of planned palliative chemotherapy.    Consent to treat has been signed.    Proceed D1C1 palliative Gemzar chemotherapy.    Patient instructed to contact us if temp > 100.4F as antibiotics and even hospitalization may be indicated.    Ambien 6.25 mg CR ordered for sleep prn.  RX Eprescribed to his pharmacy #[id number].    With drop in HgB over the past month (no known bleeding or dark stools) will obtain iron studies.    08/29/19 - follow up D1C2 palliative Gemzar chemotherapy.       2) Medical cannabis:    Patient requested registry for pain and sleep - completed.      Has not started yet but intends to.    3) Acute left foot drop:    Improved with time.  No lumbosacral radiculopathy or paresthesia.      ? related to/exacerbated by not well controlled diabetes + oxaliplatin-related neuropathy.  Possibly getting better.    08/30/18 MR L-spine - Unremarkable appearance of the cord and cauda equina nerve roots. Mild degenerative changes as above, without sites of high-grade spinal or neural foraminal stenosis. No evidence of bony metastatic disease.    4) Diabetes type 2:    On Glucophage + Jardiance + Amaryl.    Encouraged improved control.    5) Pain:    ABD - cancer related.    Managed with 0-2 dilaudid/day.    Reviewed opioid bowel prophylaxis.    ECOG Performance:     ECOG Performance Status: 1     Distress Assessment:    Distress Assessment Score: 3(starting chemo again  today-wondering how going to feel)    Pain:    Currently in Pain: Yes  Pain Score (Initial OR Reassessment): 2  Location: intermittent abd, back        TT > 25 minutes face to face with > 50% counseling and care coordination.    Ken KATIANA Eid, CNP   ___________________________________________    Interim History:    Gurpreet presents in good spirits, looking and feeling quite fatigued anticipating starting palliative chemotherapy.  He is afebrile.  His weight has decreased ~25 pounds since March, now fairly stable.  He has a good appetite, rare nausea and no vomiting.  Since his laparoscopic surgery for gangrenous cholecystitis he has worsening watery diarrhea.  The dose of creon was recently increased which has helped.  He has not tried any imodium.  He's had chronic insomnia for quite some time and is requesting Ambien.  His abdominal pain is well managed with current analgesia.  He denies cough, fever, chills, unusual headaches, visual or mentation disturbance, bladder issues.     Review of Systems:    Constitutional  Constitutional (WDL): Exceptions to WDL  Fatigue: Fatigue not relieved by rest - Limiting instrumental ADL  Fever: 38.0 - 39.0 degrees C (100.4 - 102.2 degrees F)(1 day of fevers ~ 1 week ago-101.8)  Chills: Mild sensation of cold, shivering, chattering of teeth(with fever ~1 week ago)  Weight Gain: None  Weight Loss: to <10% from baseline, intervention not indicated(2# 7/17/19)  Neurosensory  Peripheral Motor Neuropathy: Asymptomatic, clinical or diagnostic observations only, intervention not indicated  Ataxia: Asymptomatic, clinical or diagnostic observations only, intervention not indicated  Peripheral Sensory Neuropathy: Asymptomatic, loss of deep tendon reflexes or paresthesia(feet)  Confusion: None  Syncope: None  Eye   Eye Disorder (WDL): All eye disorder elements are within defined limits  Ear  Ear Disorder (WDL): All ear disorder elements are within defined  limits  Cardiovascular  Cardiovascular (WDL): Exceptions to WDL(tachy)  Palpitations: None  Edema: No  Phlebitis: None  Superficial thrombophlebitis: None  Pulmonary  Respiratory (WDL): Within Defined Limits  Gastrointestinal  Gastrointestinal (WDL): Exceptions to WDL(intermittent abd pain)  Constipation: None  Diarrhea: Increase of greater than or equal to 7 stools per day over baseline, incontinence, hospitalization indicated, severe increase in ostomy output compared to baseline, limiting self care ADL(~6-8 stools/day-loose)  Dysphagia: None  Esophagitis: Asymptomatic, clinical or diagnostic observations only, intervention not indicated(occ)  Nausea: Loss of appetite without alteration in eating habits(occ)  Pharyngitis: None  Vomiting: None  Dysgeusia: None  Dry Mouth: None  Genitourinary  Genitourinary (WDL): Exceptions to WDL  Urinary Frequency: Present(@ noc)  Urinary Retention: None  Urinary Tract Pain: None  Lymphatic  Lymph (WDL): All lymph disorder elements are within defined limits  Musculoskeletal and Connective Tissue  Musculoskeletal and Connetive Tissue Disorders (WDL): Exceptions to WDL  Arthralgia: Mild pain(back)  Bone Pain: None  Muscle Weakness : Symptomatic, perceived by patient but not evident on physical exam  Myalgia: Mild pain(back)  Integumentary  Integumentary (WDL): All integumentary elements are within defined limits  Patient Coping  Patient Coping: Open/discussion;Accepting  Distress Assessment  Distress Assessment Score: 3(starting chemo again today-wondering how going to feel)  Accompanied by  Accompanied by: Alone  Oral Chemo Adherence       Past Histories:    Past Medical History:   Diagnosis Date     Diabetes mellitus (H)      Hypercholesteremia      Hypertension      Sleep apnea        Physical Exam:    Recent Vitals 8/2/2019   Weight 147 lbs 10 oz   BSA (m2) 1.82 m2   /60   Pulse 102   Temp 98.6   Temp src 1   SpO2 99   Some recent data might be hidden     General:  Alert.   Pleasant.  No acute distress.    HEENT:  Anicteric sclera.  DASHAWN.  EOMI.  No mucosal lesions.  Lymphatics:  No abnormally enlarged lymph nodes.  Chest:   Lungs clear to auscultation.  Heart:   S1 S2 heard.  RRR.  No murmur heard.   Abdomen:  Somewhat firm.  Mild tenderness.  No distention.  No guarding.  Distant BS.  Extremities:  No edema.  Skin:   No rash noted.  CNS:   Non focal.    Lab Results:    Reviewed with patient.    Recent Results (from the past 24 hour(s))   Comprehensive Metabolic Panel   Result Value Ref Range    Sodium 139 136 - 145 mmol/L    Potassium 4.2 3.5 - 5.0 mmol/L    Chloride 104 98 - 107 mmol/L    CO2 26 22 - 31 mmol/L    Anion Gap, Calculation 9 5 - 18 mmol/L    Glucose 353 (H) 70 - 125 mg/dL    BUN 8 8 - 22 mg/dL    Creatinine 0.75 0.70 - 1.30 mg/dL    GFR MDRD Af Amer >60 >60 mL/min/1.73m2    GFR MDRD Non Af Amer >60 >60 mL/min/1.73m2    Bilirubin, Total 0.2 0.0 - 1.0 mg/dL    Calcium 9.7 8.5 - 10.5 mg/dL    Protein, Total 6.6 6.0 - 8.0 g/dL    Albumin 3.8 3.5 - 5.0 g/dL    Alkaline Phosphatase 278 (H) 45 - 120 U/L    AST 44 (H) 0 - 40 U/L    ALT 84 (H) 0 - 45 U/L   HM1 (CBC with Diff)   Result Value Ref Range    WBC 5.8 4.0 - 11.0 thou/uL    RBC 3.14 (L) 4.40 - 6.20 mill/uL    Hemoglobin 8.3 (L) 14.0 - 18.0 g/dL    Hematocrit 27.0 (L) 40.0 - 54.0 %    MCV 86 80 - 100 fL    MCH 26.4 (L) 27.0 - 34.0 pg    MCHC 30.7 (L) 32.0 - 36.0 g/dL    RDW 16.6 (H) 11.0 - 14.5 %    Platelets 285 140 - 440 thou/uL    MPV 9.9 8.5 - 12.5 fL   Manual Differential   Result Value Ref Range    Total Neutrophils % 76 (H) 50 - 70 %    Lymphocytes % 15 (L) 20 - 40 %    Monocytes % 8 2 - 10 %    Eosinophils %  1 0 - 6 %    Basophils % 0 0 - 2 %    Total Neutrophils Absolute 4.4 2.0 - 7.7 thou/ul    Lymphocytes Absolute 0.9 0.8 - 4.4 thou/uL    Monocytes Absolute 0.5 0.0 - 0.9 thou/uL    Eosinophils Absolute 0.1 0.0 - 0.4 thou/uL    Basophils Absolute 0.0 0.0 - 0.2 thou/uL    Platelet Estimate Normal Normal     Polychromasia 1+ (!) Negative         Imaging:    No new imaging.

## 2021-05-31 NOTE — PROGRESS NOTES
[facility] Hematology and Oncology Progress Note    Patient: Gurpreet Gu  MRN: [id number]  Date of Service: 8/29/2019      Reason for Visit    Chief Complaint   Patient presents with     HE Cancer     Pancreatic Cancer       Assessment and Plan  Cancer Staging  Malignant neoplasm of head of pancreas (H)  Staging form: Exocrine Pancreas, AJCC 8th Edition  - Clinical stage from 7/24/2018: Stage IB (cT2, cN0, cM0) - Signed by Xochitl Quick MD on 7/24/2018      ECOG Performance   ECOG Performance Status: 1     Distress Assessment  Distress Assessment Score: 4(thought appt was tomorrow)    Pain  Currently in Pain: Yes  Pain Score (Initial OR Reassessment): 4  Location: Abd    #.  Progressive to metastatic pancreatic cancer of the head of the pancreas.  Initially diagnosed at borderline resectable pancreatic cancer (3.8 x 4.1 cm by CT) pancreatic head mass in May 2018. MMR- intact. NTRK negative.   He is here for cycle #2 chemotherapy.  He is feeling okay.  He denies any major intolerance to chemotherapy at this point.  Labs were reviewed and noted improvement in hemoglobin after IV iron therapy.   We will continue cycle #2 chemotherapy with gemcitabine.   Plan for restaging CT scan in 4 weeks prior to cycle #3 chemotherapy.     #.  Abdominal pain and back pain-cancer related   He appears minimizing his symptoms.  He has intermittent grimacing during the visit from abdominal discomfort.  I offer him to start on long-acting pain medication and he agreed.  I will start MS Contin 15 mg twice daily for schedule and continue Dilaudid 2 mg 2-3 times a day as needed.  Will increase MS Contin gradually to achieve the good basal pain control.     He is offered palliative care referral.  He does not want to follow-up or see a new provider at this point.      #.  Chronic diarrhea   Likely from pancreatic insufficiency and post cholecystectomy.  He is currently on Lomotil, Creon.  I double his Creon dosage to 6000 three times a  day with meal and 1 with snack.    #. DM-2   Uncontrolled.  I refilled his diabetic medication.  He is strongly encouraged him to follow up with Dr. Reed for diabetes management.    Problem List    1. Cancer related pain  morphine (MS CONTIN) 15 MG 12 hr tablet   2. Malignant neoplasm of head of pancreas (H)  CC OFFICE VISIT LONG    morphine (MS CONTIN) 15 MG 12 hr tablet    CT Chest Abdomen Pelvis With Oral With IV Cont    Infusion Appointment    Infusion Appointment    CC OFFICE VISIT LONG    Infusion Appointment    DISCONTINUED: sodium chloride 0.9% 250 mL infusion    DISCONTINUED: ondansetron injection 8 mg (ZOFRAN)    DISCONTINUED: gemcitabine 1,800 mg in sodium chloride 0.9% 250 mL chemo (GEMZAR)    DISCONTINUED: sodium chloride flush 20 mL (NS)    DISCONTINUED: heparin lockflush (PF) porcine 300-600 Units    DISCONTINUED: diphenhydrAMINE injection 50 mg (BENADRYL)    DISCONTINUED: famotidine 20 mg/2 mL injection 20 mg (PEPCID)    DISCONTINUED: hydrocortisone sod succ (PF) injection 100 mg    DISCONTINUED: acetaminophen tablet 1,000 mg (TYLENOL)    DISCONTINUED: sodium chloride 0.9% 500 mL   3. Encounter for antineoplastic chemotherapy  CC OFFICE VISIT LONG    Infusion Appointment    Infusion Appointment    CC OFFICE VISIT LONG    Infusion Appointment    DISCONTINUED: sodium chloride 0.9% 250 mL infusion    DISCONTINUED: ondansetron injection 8 mg (ZOFRAN)    DISCONTINUED: gemcitabine 1,800 mg in sodium chloride 0.9% 250 mL chemo (GEMZAR)    DISCONTINUED: sodium chloride flush 20 mL (NS)    DISCONTINUED: heparin lockflush (PF) porcine 300-600 Units    DISCONTINUED: diphenhydrAMINE injection 50 mg (BENADRYL)    DISCONTINUED: famotidine 20 mg/2 mL injection 20 mg (PEPCID)    DISCONTINUED: hydrocortisone sod succ (PF) injection 100 mg    DISCONTINUED: acetaminophen tablet 1,000 mg (TYLENOL)    DISCONTINUED: sodium chloride 0.9% 500 mL   4. CA head of pancreas (H)  CC OFFICE VISIT LONG    Infusion Appointment     Infusion Appointment    CC OFFICE VISIT LONG    Infusion Appointment    DISCONTINUED: sodium chloride 0.9% 250 mL infusion    DISCONTINUED: ondansetron injection 8 mg (ZOFRAN)    DISCONTINUED: gemcitabine 1,800 mg in sodium chloride 0.9% 250 mL chemo (GEMZAR)    DISCONTINUED: sodium chloride flush 20 mL (NS)    DISCONTINUED: heparin lockflush (PF) porcine 300-600 Units    DISCONTINUED: diphenhydrAMINE injection 50 mg (BENADRYL)    DISCONTINUED: famotidine 20 mg/2 mL injection 20 mg (PEPCID)    DISCONTINUED: hydrocortisone sod succ (PF) injection 100 mg    DISCONTINUED: acetaminophen tablet 1,000 mg (TYLENOL)    DISCONTINUED: sodium chloride 0.9% 500 mL   5. Pancreatic cancer metastasized to liver (H)  CC OFFICE VISIT LONG    CT Chest Abdomen Pelvis With Oral With IV Cont    Infusion Appointment    Infusion Appointment    CC OFFICE VISIT LONG    Infusion Appointment    DISCONTINUED: sodium chloride 0.9% 250 mL infusion    DISCONTINUED: ondansetron injection 8 mg (ZOFRAN)    DISCONTINUED: gemcitabine 1,800 mg in sodium chloride 0.9% 250 mL chemo (GEMZAR)    DISCONTINUED: sodium chloride flush 20 mL (NS)    DISCONTINUED: heparin lockflush (PF) porcine 300-600 Units    DISCONTINUED: diphenhydrAMINE injection 50 mg (BENADRYL)    DISCONTINUED: famotidine 20 mg/2 mL injection 20 mg (PEPCID)    DISCONTINUED: hydrocortisone sod succ (PF) injection 100 mg    DISCONTINUED: acetaminophen tablet 1,000 mg (TYLENOL)    DISCONTINUED: sodium chloride 0.9% 500 mL   6. Pancreatic insufficiency  lipase-protease-amylase (CREON) 6,000-19,000 -30,000 unit CpDR capsule   7. Type 2 diabetes mellitus with complication, without long-term current use of insulin (H)  SITagliptin (JANUVIA) 100 MG tablet    empagliflozin (JARDIANCE) 10 mg Tab      ______________________________________________________________________________  Diagnosis  May 2018- new ill-defined hypodensity in the pancreatic head mass measuring about 3.5 cm by CT scan upon  evaluation of left lower quadrant pain.  6/7/2018-FNA of the pancreatic head mass was positive for adenocarcinoma.  6/22/2018: PET/CT scan show ill-defined mass along the upper anterior margin of the pancreas adjacent to the duodenum with moderate FDG avidity, SUV max 4.7.  No additional sites of suspicious FDG avidity.    Treatment to date  7/24/2018 -1/23/2019: Completed neoadjuvant chemotherapy with FOLFIRINOX.     -Dose reduced in first cycle due to hyperbilirubinemia :5-FU to 50% (1400 mg/m  from 2800 mg/m ), irinotecan dose to 135 mg/m  from 180 mg/m .  Increased to full dose starting cycle #2 till C#6 of chemotherapy.    -Decreased irinotecan dose to 162 mg/m  from 180 mg/m  due to persistent diarrhea (watery about 12 times per day) throughout the cycle, and decreased oxaliplatin to 65 mg/m  due to persistent and worsening neuropathy in hands> feet and intolerable cold sensitivity starting cycle #7.  Further dose reduction of irinotecan to 150 mg/m  from cycle # 9-12.  5/2019-radiographic progression of the pancreatic mass  7/2019-a small 6 mm hypodense lesion in the right hepatic lobe initially evident in July 2018 has gradually enlarged to 1.9 cm along with gradually enlarging pancreatic head mass.  Image guided liver biopsy confirmed metastatic adenocarcinoma of pancreas.  7/24/2019-palliative intent C#1 gemcitabine      History of Present Illness    Gurpreet presents today for scheduled chemotherapy.    Reported diarrhea about 10-12 times watery stool a day.  Constant abdominal pain about 3-4 out of 10 with intermittent worsening pain.  Described as cramping.  He has been taking Dilaudid 2 mg 2-3 times a day.  No vomiting.     He shared with me that he visited her daughter last Friday in [place] and came back here on Monday.  He discussed with her about his end-of-life care.     Pain Status  Currently in Pain: Yes    Review of Systems    Constitutional  Constitutional (WDL): Exceptions to WDL  Fatigue:  Fatigue relieved by rest(better since taking iron)  Fever: None  Chills: None  Weight Gain: None  Weight Loss: to <10% from baseline, intervention not indicated(3# 8/9/19)  Neurosensory  Neurosensory (WDL): Exceptions to WDL  Peripheral Motor Neuropathy: Asymptomatic, clinical or diagnostic observations only, intervention not indicated(foot drop)  Ataxia: Asymptomatic, clinical or diagnostic observations only, intervention not indicated  Peripheral Sensory Neuropathy: Asymptomatic, loss of deep tendon reflexes or paresthesia  Confusion: None  Syncope: None  Eye   Eye Disorder (WDL): All eye disorder elements are within defined limits  Ear  Ear Disorder (WDL): All ear disorder elements are within defined limits  Cardiovascular  Cardiovascular (WDL): All cardiovascular elements are within defined limits  Pulmonary  Respiratory (WDL): Exceptions to WDL  Cough: None  Dyspnea: Shortness of breath with moderate exertion(intermittently)  Hypoxia: None  Gastrointestinal  Gastrointestinal (WDL): Exceptions to WDL  Anorexia: None  Constipation: None  Diarrhea: Increase of greater than or equal to 7 stools per day over baseline, incontinence, hospitalization indicated, severe increase in ostomy output compared to baseline, limiting self care ADL(10-12/day-watery)  Dysphagia: None  Esophagitis: Asymptomatic, clinical or diagnostic observations only, intervention not indicated(occ)  Nausea: Loss of appetite without alteration in eating habits  Pharyngitis: None  Vomiting: None  Dysgeusia: None  Dry Mouth: None  Genitourinary  Genitourinary (WDL): All genitourinary elements are within defined limits  Lymphatic  Lymph (WDL): All lymph disorder elements are within defined limits  Musculoskeletal and Connective Tissue  Musculoskeletal and Connetive Tissue Disorders (WDL): Exceptions to WDL  Arthralgia: Mild pain  Bone Pain: None  Muscle Weakness : Symptomatic, perceived by patient but not evident on physical exam  Myalgia: Mild  pain  Integumentary  Integumentary (WDL): All integumentary elements are within defined limits  Patient Coping  Patient Coping: Accepting;Open/discussion  Distress Assessment  Distress Assessment Score: 4(thought appt was tomorrow)  Accompanied by  Accompanied by: Alone  Oral Chemo Adherence       Past History  Past Medical History:   Diagnosis Date     Diabetes mellitus (H)      Hypercholesteremia      Hypertension      Sleep apnea        Physical Exam    Recent Vitals 8/29/2019   Weight 140 lbs 5 oz   BSA (m2) 1.77 m2   /73   Pulse 94   Temp 98.5   Temp src 1   SpO2 99   Some recent data might be hidden     General: alert, awake, in mild distress with intermittent grimacing from pain.    HEENT: Head: Normal, normocephalic, atraumatic.  Eye: Normal external eye, conjunctiva, lids cornea, DASHAWN.  Ears:  Non-tender.  Nose: Normal external nose, mucus membranes and septum.  Pharynx: Dental Hygiene adequate. Normal buccal mucosa. Normal pharynx.  Neck / Thyroid: Supple, no masses, nodes, nodules or enlargement.  Lymphatics: No abnormally enlarged lymph nodes.  Chest: Normal chest wall and respirations. Clear to auscultation.  Heart: S1 S2 RRR, no murmur.   Abdomen: abdomen is slightly protuberant, soft with mild diffuse tenderness, no palpable masses, organomegaly, guarding.    Extremities: normal strength, tone, and muscle mass  Skin: normal. no rash or abnormalities  CNS: non focal.    Lab Results    Recent Results (from the past 168 hour(s))   Comprehensive Metabolic Panel   Result Value Ref Range    Sodium 138 136 - 145 mmol/L    Potassium 4.4 3.5 - 5.0 mmol/L    Chloride 99 98 - 107 mmol/L    CO2 27 22 - 31 mmol/L    Anion Gap, Calculation 12 5 - 18 mmol/L    Glucose 432 (HH) 70 - 125 mg/dL    BUN 16 8 - 22 mg/dL    Creatinine 1.02 0.70 - 1.30 mg/dL    GFR MDRD Af Amer >60 >60 mL/min/1.73m2    GFR MDRD Non Af Amer >60 >60 mL/min/1.73m2    Bilirubin, Total 0.2 0.0 - 1.0 mg/dL    Calcium 9.2 8.5 - 10.5 mg/dL     Protein, Total 6.5 6.0 - 8.0 g/dL    Albumin 3.6 3.5 - 5.0 g/dL    Alkaline Phosphatase 425 (H) 45 - 120 U/L    AST 28 0 - 40 U/L    ALT 57 (H) 0 - 45 U/L   HM1 (CBC with Diff)   Result Value Ref Range    WBC 5.7 4.0 - 11.0 thou/uL    RBC 3.79 (L) 4.40 - 6.20 mill/uL    Hemoglobin 10.3 (L) 14.0 - 18.0 g/dL    Hematocrit 33.9 (L) 40.0 - 54.0 %    MCV 89 80 - 100 fL    MCH 27.2 27.0 - 34.0 pg    MCHC 30.4 (L) 32.0 - 36.0 g/dL    RDW 23.5 (H) 11.0 - 14.5 %    Platelets 444 (H) 140 - 440 thou/uL    MPV 10.0 8.5 - 12.5 fL    Neutrophils % 67 50 - 70 %    Lymphocytes % 18 (L) 20 - 40 %    Monocytes % 13 (H) 2 - 10 %    Eosinophils % 1 0 - 6 %    Basophils % 1 0 - 2 %    Neutrophils Absolute 3.8 2.0 - 7.7 thou/uL    Lymphocytes Absolute 1.0 0.8 - 4.4 thou/uL    Monocytes Absolute 0.8 0.0 - 0.9 thou/uL    Eosinophils Absolute 0.0 0.0 - 0.4 thou/uL    Basophils Absolute 0.0 0.0 - 0.2 thou/uL   Manual Differential   Result Value Ref Range    Platelet Estimate Increased (!) Normal       Imaging    No results found.    Signed by: Xochitl Quick MD

## 2021-05-31 NOTE — TELEPHONE ENCOUNTER
"Message from LINA Eid CNP:      \"Please inform Gurpreet his iron studies show iron deficiency.  See if he's ever tried oral iron.  If no we will begin oral iron twice daily with orange juice.  If tried oral iron and not tolerated we will need to bring him in for iron replete.\"    Message left with patient to let him know iron/ferritin low and to call back for further instructions.  [employer] message also sent/SERENITY Bang RN  "

## 2021-05-31 NOTE — PROGRESS NOTES
Patient here today for labs, OV with LINA Eid CNP, and D1C1-gemcitabine for metastatic pancreatic CA/SERENITY Bang RN

## 2021-05-31 NOTE — TELEPHONE ENCOUNTER
Dr. Quick requesting patient's PCP (Dr. Reed) manage patient's diabetes/elevated blood sugars.  Called [facility], they will pass message on to Dr. Reed/SERENITY Bang RN

## 2021-06-01 ENCOUNTER — RECORDS - HEALTHEAST (OUTPATIENT)
Dept: ADMINISTRATIVE | Facility: CLINIC | Age: 66
End: 2021-06-01

## 2021-06-01 VITALS — WEIGHT: 173.1 LBS | HEIGHT: 70 IN | BODY MASS INDEX: 24.78 KG/M2

## 2021-06-01 VITALS
WEIGHT: 189 LBS | HEIGHT: 70 IN | BODY MASS INDEX: 27.06 KG/M2 | BODY MASS INDEX: 27.06 KG/M2 | HEIGHT: 70 IN | WEIGHT: 189 LBS | HEIGHT: 70 IN | BODY MASS INDEX: 27.06 KG/M2 | WEIGHT: 189 LBS

## 2021-06-01 VITALS
HEIGHT: 70 IN | WEIGHT: 192.13 LBS | BODY MASS INDEX: 27.51 KG/M2 | BODY MASS INDEX: 27.51 KG/M2 | HEIGHT: 70 IN | WEIGHT: 192.13 LBS

## 2021-06-01 VITALS — HEIGHT: 70 IN | WEIGHT: 163.2 LBS | BODY MASS INDEX: 23.37 KG/M2

## 2021-06-01 VITALS — WEIGHT: 184.8 LBS | BODY MASS INDEX: 26.45 KG/M2 | HEIGHT: 70 IN

## 2021-06-01 VITALS — HEIGHT: 70 IN | BODY MASS INDEX: 24.68 KG/M2 | WEIGHT: 172.4 LBS

## 2021-06-01 NOTE — TELEPHONE ENCOUNTER
Good Morning,            Please refill the following medications. Thanking you in advance.            1. Hydromorphone- pain      2. Creon- digestion      3. Zolpidem - sleep

## 2021-06-01 NOTE — PROGRESS NOTES
[facility] Hematology and Oncology Progress Note    Patient: Gurpreet Gu  MRN: [id number]  Date of Service: 9/25/2019      Reason for Visit    Chief Complaint   Patient presents with     HE Cancer     Pancreatic Cancer       Assessment and Plan  Cancer Staging  Malignant neoplasm of head of pancreas (H)  Staging form: Exocrine Pancreas, AJCC 8th Edition  - Clinical stage from 7/24/2018: Stage IB (cT2, cN0, cM0) - Signed by Xochitl Quick MD on 7/24/2018      ECOG Performance   ECOG Performance Status: 1     Distress Assessment  Distress Assessment Score: 4(scan results)    Pain  Currently in Pain: Yes  Pain Score (Initial OR Reassessment): 2  Location: abdomen    #.  Metastatic pancreatic cancer of the head of the pancreas.  Initially diagnosed at borderline resectable pancreatic cancer (3.8 x 4.1 cm by CT) pancreatic head mass in May 2018. MMR- intact. NTRK negative.   He continues to lose weight.  He does not feel to get chemotherapy today mainly because his cousin is visiting him this week.  He will resume chemotherapy next week.  We reviewed the interval CT scan.  Pancreatic head mass is slightly decreased, however solitary liver met has slightly increase in size.  Lymph nodes were stable.  I explained to him that overall it is a mixed response, I would continue at least current chemotherapy with weekly gemcitabine.  We discussed option of adding Abraxane but he is afraid of side effects of neuropathy and possibly added fatigue.  After a long discussion, we decided to continue current chemotherapy with gemcitabine and to reassess the treatment response after another 2 cycles of chemotherapy.     Return to chemotherapy next week with labs.  He does not need provider visit for that they as today's visit will complete at the visit for next week.     Follow-up provider visit prior to cycle #4 chemotherapy.   We will plan for restaging CT scan prior to cycle #5 chemotherapy.    Today we again discussed about  overall goals of care and management going forward.  We discussed that he will be eligible for Social Security application.  He still very enjoy working and he likes to continue as long as he is able.     #.  Diabetes- type 2    He has been losing weight significantly whether it is related to uncontrolled diabetes.  I stressed to him that his blood glucose is very concerning and need to address without any further delay.  He admitted that he will call his primary doctor for further management of diabetes.     #.  Abdominal pain and back pain, cancer related   He said that his back pain is about the same.  He takes MS Contin 15 mg twice a day and Dilaudid 2 mg 2-3 times a day as needed.   He does not want to see palliative care at this point.    #.  Chronic diarrhea   Likely from pancreatic insufficiency and post cholecystectomy.  He is currently on Lomotil, Creon 6000 three times a day with meal and 1 with snack.      Problem List    1. Malignant neoplasm of head of pancreas (H)  CC OFFICE VISIT LONG    Infusion Appointment    Infusion Appointment    CC OFFICE VISIT LONG    Infusion Appointment   2. Encounter for antineoplastic chemotherapy  CC OFFICE VISIT LONG    Infusion Appointment    Infusion Appointment    CC OFFICE VISIT LONG    Infusion Appointment   3. CA head of pancreas (H)  CC OFFICE VISIT LONG    Infusion Appointment    Infusion Appointment    CC OFFICE VISIT LONG    Infusion Appointment   4. Pancreatic cancer metastasized to liver (H)  CC OFFICE VISIT LONG    Infusion Appointment    Infusion Appointment    CC OFFICE VISIT LONG    Infusion Appointment      ______________________________________________________________________________  Diagnosis  May 2018- new ill-defined hypodensity in the pancreatic head mass measuring about 3.5 cm by CT scan upon evaluation of left lower quadrant pain.  6/7/2018-FNA of the pancreatic head mass was positive for adenocarcinoma.  6/22/2018: PET/CT scan show ill-defined  mass along the upper anterior margin of the pancreas adjacent to the duodenum with moderate FDG avidity, SUV max 4.7.  No additional sites of suspicious FDG avidity.    Treatment to date  7/24/2018 -1/23/2019: Completed neoadjuvant chemotherapy with FOLFIRINOX.     -Dose reduced in first cycle due to hyperbilirubinemia :5-FU to 50% (1400 mg/m  from 2800 mg/m ), irinotecan dose to 135 mg/m  from 180 mg/m .  Increased to full dose starting cycle #2 till C#6 of chemotherapy.    -Decreased irinotecan dose to 162 mg/m  from 180 mg/m  due to persistent diarrhea (watery about 12 times per day) throughout the cycle, and decreased oxaliplatin to 65 mg/m  due to persistent and worsening neuropathy in hands> feet and intolerable cold sensitivity starting cycle #7.  Further dose reduction of irinotecan to 150 mg/m  from cycle # 9-12.  5/2019-radiographic progression of the pancreatic mass  7/2019-a small 6 mm hypodense lesion in the right hepatic lobe initially evident in July 2018 has gradually enlarged to 1.9 cm along with gradually enlarging pancreatic head mass.  Image guided liver biopsy confirmed metastatic adenocarcinoma of pancreas.  7/24/2019-palliative intent C#1 gemcitabine      History of Present Illness    Gurpreet present by himself today.  He reported that he did not feel well week ago and he is feeling a little better off 1 week break from chemotherapy.  He has intermittent dizziness, vomiting and abdominal pain.  He has not see his primary care provider regarding his diabetes management.  He has a glucometer but he has not been checking his glucose either.  He he reported that his cousin is coming to visit him this weekend that he does not want to get chemotherapy this week.  He prefers to move his appointment to Friday afternoons for infusion appointment, and that way, he can recover from fatigue and return to work on Monday.  He continues to work 7 hours a day.  His coworker has been very kind to him and  donating their PTO's.  He does not feel he wants to apply for this Social Security.    Pain Status  Currently in Pain: Yes    Review of Systems    Constitutional  Constitutional (WDL): Exceptions to WDL  Fatigue: Fatigue not relieved by rest - Limiting instrumental ADL  Fever: None  Chills: None  Weight Gain: None  Weight Loss: None  Neurosensory  Neurosensory (WDL): Exceptions to WDL  Peripheral Motor Neuropathy: Asymptomatic, clinical or diagnostic observations only, intervention not indicated  Ataxia: Asymptomatic, clinical or diagnostic observations only, intervention not indicated  Peripheral Sensory Neuropathy: Asymptomatic, loss of deep tendon reflexes or paresthesia  Confusion: None  Syncope: None  Eye   Eye Disorder (WDL): All eye disorder elements are within defined limits  Ear  Ear Disorder (WDL): All ear disorder elements are within defined limits  Cardiovascular  Cardiovascular (WDL): All cardiovascular elements are within defined limits  Pulmonary  Respiratory (WDL): Exceptions to WDL  Cough: None  Dyspnea: Shortness of breath with moderate exertion  Hypoxia: None  Gastrointestinal  Gastrointestinal (WDL): Exceptions to WDL  Anorexia: Loss of appetite without alteration in eating habits  Constipation: None  Diarrhea: Increase of 4 - 6 stools per day over baseline, moderate increase in ostomy output compared to baseline  Dysphagia: None  Esophagitis: Asymptomatic, clinical or diagnostic observations only, intervention not indicated  Nausea: Loss of appetite without alteration in eating habits  Pharyngitis: None  Vomitin - 2 episodes ( by 5 minutes) in 24 hrs(1 week ago)  Dysgeusia: None  Dry Mouth: None  Genitourinary  Genitourinary (WDL): Exceptions to WDL  Urinary Frequency: Present  Urinary Retention: None  Urinary Tract Pain: None  Lymphatic  Lymph (WDL): All lymph disorder elements are within defined limits  Musculoskeletal and Connective Tissue  Musculoskeletal and Connetive Tissue  Disorders (WDL): Exceptions to WDL  Integumentary  Integumentary (WDL): All integumentary elements are within defined limits  Patient Coping  Patient Coping: Accepting;Open/discussion  Distress Assessment  Distress Assessment Score: 4(scan results)  Accompanied by  Accompanied by: Alone  Oral Chemo Adherence       Past History  Past Medical History:   Diagnosis Date     Diabetes mellitus (H)      Hypercholesteremia      Hypertension      Sleep apnea        Physical Exam    Recent Vitals 9/25/2019   Height -   Weight 133 lbs 5 oz   BSA (m2) 1.73 m2   /64   Pulse 107   Temp 98.2   Temp src 1   SpO2 98   Some recent data might be hidden     General: alert, awake, not in acute distress  HEENT: Head: Normal, normocephalic, atraumatic.  Eye: Normal external eye, conjunctiva, lids cornea, DASHAWN.  Ears:  Non-tender.  Nose: Normal external nose, mucus membranes and septum.  Pharynx: Dental Hygiene adequate. Normal buccal mucosa. Normal pharynx.  Neck / Thyroid: Supple, no masses, nodes, nodules or enlargement.  Lymphatics: No abnormally enlarged lymph nodes.  Chest: Normal chest wall and respirations. Clear to auscultation.  Heart: S1 S2 RRR, no murmur.   Abdomen: abdomen is soft without significant tenderness, masses, organomegaly or guarding  Extremities: normal strength, tone, and muscle mass  Skin: normal. no rash or abnormalities  CNS: non focal.    Lab Results    No results found for this or any previous visit (from the past 168 hour(s)).    Imaging    Ct Chest Abdomen Pelvis With Oral With Iv Cont    Result Date: 9/24/2019  EXAM: CT CHEST ABDOMEN PELVIS W ORAL W IV CONTRAST LOCATION: [facility] DATE/TIME: 9/24/2019 4:26 PM INDICATION: Pancreatic adenocarcinoma metastatic to liver. COMPARISON: CTs abdomen and pelvis 7/1/2019, 5/21/2019 and 3/24/2019, 6/18/2019 PET/CT and 2/12/2019 CT chest, abdomen and pelvis. TECHNIQUE: Helical thin-section CT scan of the chest, abdomen, and pelvis was performed before  and after injection of IV contrast. Multiplanar reformats were obtained. Dose reduction techniques were used. CONTRAST: Iohexol (Omni) 100 mL. FINDINGS: CHEST: Benign calcified granuloma left lower lobe and several thin strands of fibrosis in the right middle lobe and both lower lobes unchanged. Lungs are otherwise clear. No pleural effusion. No pulmonary nodules. No lymphadenopathy in the chest. Heart size normal. Port anterior left chest wall with left subclavian vein central venous catheter tip mid SVC.  ABDOMEN: Ill-defined pancreatic head mass measures about 3.3 cm transverse and 2.5 cm AP (axial image 109) compared with 3.7 x 2.7 cm previously. Slight increase in degree of distal pancreatic ductal dilatation. Stable encasement of the hepatic artery by  tumor and mild extrinsic mass effect on the portal vein confluence and left hepatic vein. Encasement and obstruction of the proximal duodenum with surgical gastrojejunostomy. Stable moderate bile duct dilatation due to tumor obstruction decompressed with well-positioned patent endoluminal metallic common duct stent. Multiple lymph nodes in the ariane hepatis probably stable biopsy-proven hypoenhancing low-attenuation metastasis segment 6 right hepatic lobe inferiorly 2.5 x 2.1 cm, previously 1.9 x 1.6 cm. PELVIS: No mass, free fluid or lymphadenopathy. MUSCULOSKELETAL: Negative.     CONCLUSION: 1.  Pancreatic head mass appears to be minimally smaller compared with 7/1/2019. 2.  Biopsy-proven metastasis right hepatic lobe has increased in size slightly. 3.  Ariane hepatis lymph node enlargement appears to be stable. 4.  No new disease      Signed by: Xochitl Quick MD

## 2021-06-02 VITALS — BODY MASS INDEX: 23.13 KG/M2 | WEIGHT: 161.2 LBS

## 2021-06-02 VITALS — WEIGHT: 161.6 LBS | HEIGHT: 70 IN | BODY MASS INDEX: 23.13 KG/M2

## 2021-06-02 VITALS — BODY MASS INDEX: 23.66 KG/M2 | HEIGHT: 70 IN | WEIGHT: 165.3 LBS

## 2021-06-02 VITALS — HEIGHT: 70 IN | BODY MASS INDEX: 22.48 KG/M2 | WEIGHT: 157 LBS

## 2021-06-02 VITALS — BODY MASS INDEX: 22.58 KG/M2 | WEIGHT: 157.4 LBS

## 2021-06-02 VITALS — BODY MASS INDEX: 24.18 KG/M2 | WEIGHT: 168.5 LBS

## 2021-06-02 VITALS — BODY MASS INDEX: 23.69 KG/M2 | HEIGHT: 70 IN | WEIGHT: 165.5 LBS

## 2021-06-02 VITALS — HEIGHT: 70 IN | WEIGHT: 154 LBS | BODY MASS INDEX: 22.05 KG/M2

## 2021-06-02 VITALS — HEIGHT: 70 IN | WEIGHT: 174.7 LBS | BODY MASS INDEX: 25.01 KG/M2

## 2021-06-02 VITALS — BODY MASS INDEX: 21.52 KG/M2 | WEIGHT: 150 LBS

## 2021-06-02 VITALS — BODY MASS INDEX: 22.15 KG/M2 | WEIGHT: 154.4 LBS

## 2021-06-02 VITALS — WEIGHT: 161.8 LBS | BODY MASS INDEX: 23.22 KG/M2

## 2021-06-02 VITALS — BODY MASS INDEX: 24.74 KG/M2 | WEIGHT: 172.4 LBS

## 2021-06-02 NOTE — PROGRESS NOTES
Patient ambulated into Infusion Care by himself. PORT accessed with excellent blood return and labs drawn. Premeds administered and PPE donned. Gemzar double checked with Virginia Barlow RN. Patient tolerated infusion without any problems. PORT flushed with Normal Saline and 6 cc Heparin and deaccessed. Dry 2 x 2 gauze taped over PORT site and all questions answered. Patient declined the 2019 Influenza Vaccine.

## 2021-06-02 NOTE — PROGRESS NOTES
"Gurpreet presents today for D15C3 Gemzar. He tells me he didn't come last week for D8 because he wasn't feeling well. He reported n/v/fatigue. He is feeling \"mostly better now.\" PAC accessed and labs drawn, and meet parameters for tx. However, noted . Order for 10 units regular insulin received by jonn Eid, NP. Pt to recheck bg at home. He tolerated gemzar without incident. Upon completion, PAC heparinized and he left ambulatory per self. Fatimah Mike    "

## 2021-06-02 NOTE — TELEPHONE ENCOUNTER
I called to check on Gurpreet since he cancelled his appt today to find out how he was feeling. I left a message for him to call me back and that we got his refill requests which we will send to Ken Eid. Marjorie Mcginnis RN

## 2021-06-03 VITALS
OXYGEN SATURATION: 98 % | BODY MASS INDEX: 19.13 KG/M2 | DIASTOLIC BLOOD PRESSURE: 64 MMHG | SYSTOLIC BLOOD PRESSURE: 110 MMHG | WEIGHT: 133.3 LBS | HEART RATE: 107 BPM | TEMPERATURE: 98.2 F

## 2021-06-03 VITALS — BODY MASS INDEX: 21.44 KG/M2 | WEIGHT: 149.4 LBS

## 2021-06-03 VITALS — WEIGHT: 140.3 LBS | BODY MASS INDEX: 20.13 KG/M2

## 2021-06-03 VITALS — BODY MASS INDEX: 20.59 KG/M2 | WEIGHT: 143.5 LBS

## 2021-06-03 VITALS
BODY MASS INDEX: 19.53 KG/M2 | SYSTOLIC BLOOD PRESSURE: 137 MMHG | TEMPERATURE: 97.5 F | HEART RATE: 85 BPM | DIASTOLIC BLOOD PRESSURE: 72 MMHG | WEIGHT: 136.1 LBS

## 2021-06-03 VITALS — BODY MASS INDEX: 21.18 KG/M2 | WEIGHT: 147.6 LBS

## 2021-06-03 VITALS
DIASTOLIC BLOOD PRESSURE: 74 MMHG | SYSTOLIC BLOOD PRESSURE: 117 MMHG | OXYGEN SATURATION: 99 % | TEMPERATURE: 98 F | BODY MASS INDEX: 19.18 KG/M2 | HEART RATE: 97 BPM | WEIGHT: 134 LBS | HEIGHT: 70 IN

## 2021-06-03 VITALS
TEMPERATURE: 97.8 F | WEIGHT: 135 LBS | HEIGHT: 70 IN | SYSTOLIC BLOOD PRESSURE: 98 MMHG | DIASTOLIC BLOOD PRESSURE: 68 MMHG | BODY MASS INDEX: 19.33 KG/M2 | OXYGEN SATURATION: 100 % | HEART RATE: 99 BPM

## 2021-06-03 NOTE — TELEPHONE ENCOUNTER
Refill request received from [email or web address] for Januvia and Creon.    Januvia last refilled by Dr. Quick on 10/30/19.  Quantity #30. No refills.    Creon last refilled by Dr. Quick on 11/1/19.  Quantity #60. No refills.    Message sent to Dr. Quick/SERENITY Bang RN

## 2021-06-03 NOTE — TELEPHONE ENCOUNTER
Patient has been cancelling appts as of late. He will need a provider visit prior to filling Creon. Fatimah Mike

## 2021-06-04 VITALS
WEIGHT: 122.9 LBS | SYSTOLIC BLOOD PRESSURE: 128 MMHG | BODY MASS INDEX: 17.63 KG/M2 | TEMPERATURE: 97.7 F | HEART RATE: 100 BPM | OXYGEN SATURATION: 96 % | DIASTOLIC BLOOD PRESSURE: 83 MMHG

## 2021-06-04 NOTE — TELEPHONE ENCOUNTER
Refill request received from [facility] for Michaeluvia.  Last refilled by Dr. Quick on 12/2/19. Quantity #30. No refills.  Message sent to LINA Eid CNP as Dr. Quick is out of the office today and tomorrow.  Patient has follow-up visit with Dr. Quick on 1/7/20/SERENITY Bang RN

## 2021-06-04 NOTE — PROGRESS NOTES
Patient stated that he felt much better after receiving the fluids with potassium and receiving Dilaudid IV. Pt. Discharged per self.

## 2021-06-04 NOTE — PROGRESS NOTES
[facility] Hematology and Oncology Progress Note    Patient: Gurpreet Gu  MRN: [id number]  Date of Service: 12/16/2019      Reason for Visit    Chief Complaint   Patient presents with     HE Cancer     Malignant neoplasm of head of pancreas       Assessment and Plan  Cancer Staging  Malignant neoplasm of head of pancreas (H)  Staging form: Exocrine Pancreas, AJCC 8th Edition  - Clinical stage from 7/24/2018: Stage IB (cT2, cN0, cM0) - Signed by Xochitl Quick MD on 7/24/2018      ECOG Performance   ECOG Performance Status: 3     Distress Assessment  Distress Assessment Score: 6    Pain  Currently in Pain: Yes  Pain Score (Initial OR Reassessment): 2  Location: tailbone and stomach    #.  Metastatic pancreatic cancer of the head of the pancreas.   Initially diagnosed at borderline resectable pancreatic cancer (3.8 x 4.1 cm by CT) pancreatic head mass in May 2018. MMR- intact. NTRK negative.   He is very frail and a progressive decline in his general health.  He has stopped chemotherapy for the last 6 weeks and not getting better in terms of fatigue.  He continues to lose weight as well.  I explained to him that all are suggesting progression of cancer.     Because of his persistent bilious vomiting, I recommended for blood work today.  He wanted to check his cancer status.  Unclear benefit from imaging for cancer status assessment (clinically it appears that cancer is progressing), but would consider for symptom focus assessment.   Reviewed with him that he recently had multiple side effects from single agent chemotherapy. His clinical status declines significantly with progressive fatigue, weight loss. His current performance status is not adequate for ongoing chemotherapy. I talked to him about symptom focus care under hospice which will benefit him more than cancer directed chemotherapy. He still thought hospice is for actively dying patient. I clarified with him goals of care under hospice is symptom  management, not only about life expectancy. He changed the discussion multiple times. He has not made a decision today.   I asked him to complete HC directives and he completed today. He has only one daughter, Zahida Gu who lives in [place] is his POA.    #. Severe hypokalemia   Likely from frequent vomiting. Our infusion is closing in about 15 minutes when we got the result. I recommended to go to ER which he refused. I offered him to direct admission to hospital which is not ideal due to delay in admission process. He decided to go home and he requested oral replacement. After discussion of risk of arrhythmia and death (he thinks it has been for a while as he has been vomiting/diarrhea for a while), I agreed to send Rx for K-dur 40 mEq two times a day starting now and 1 dose in AM. He is asked to return to infusion tomorrow to recheck K and add Mg and further management.    #. HC directive- completed today.    #.  Diabetes type 2, uncontrolled.   Reported he has started Lantus insulin 10 units every night along with oral hypoglycemic agents.  His diabetes will be managed by Dr. Reed.     #.  Abdominal pain across the mid abdomen, cancer related pain.   Current pain medication is not adequate.  Increase MS Contin to 30 mg twice a day and Dilaudid 2 mg 2-3 times a day as needed.  New prescription filled today.   He would like to get medical cannabis certification again as he did not complete the process last time.    #.  Chronic diarrhea and abdominal bloating.   Likely from pancreatic insufficiency and post cholecystectomy.  Continue on Creon 3 times a day with meals and one with snack.     Problem List    1. Malignant neoplasm of head of pancreas (H)  Comprehensive Metabolic Panel    HM1(CBC and Differential)    Iron and Transferrin Iron Binding Capacity    Ferritin    Carbohydrate Antigen 19-9 (CA 19-9)    HM1 (CBC with Diff)    morphine (MS CONTIN) 30 MG 12 hr tablet    sodium chloride flush 10 mL (NS)     heparin lockflush (PF) porcine 300-600 Units   2. Cancer related pain  morphine (MS CONTIN) 30 MG 12 hr tablet    sodium chloride flush 10 mL (NS)    heparin lockflush (PF) porcine 300-600 Units   3. CA head of pancreas (H)  HYDROmorphone (DILAUDID) 2 MG tablet    sodium chloride flush 10 mL (NS)    heparin lockflush (PF) porcine 300-600 Units    Added automatically from request for surgery [id number]      ______________________________________________________________________________  Diagnosis  May 2018- new ill-defined hypodensity in the pancreatic head mass measuring about 3.5 cm by CT scan upon evaluation of left lower quadrant pain.  6/7/2018-FNA of the pancreatic head mass was positive for adenocarcinoma.  6/22/2018: PET/CT scan show ill-defined mass along the upper anterior margin of the pancreas adjacent to the duodenum with moderate FDG avidity, SUV max 4.7.  No additional sites of suspicious FDG avidity.    Treatment to date  7/24/2018 -1/23/2019: Completed neoadjuvant chemotherapy with FOLFIRINOX.     -Dose reduced in first cycle due to hyperbilirubinemia :5-FU to 50% (1400 mg/m  from 2800 mg/m ), irinotecan dose to 135 mg/m  from 180 mg/m .  Increased to full dose starting cycle #2 till C#6 of chemotherapy.    -Decreased irinotecan dose to 162 mg/m  from 180 mg/m  due to persistent diarrhea (watery about 12 times per day) throughout the cycle, and decreased oxaliplatin to 65 mg/m  due to persistent and worsening neuropathy in hands> feet and intolerable cold sensitivity starting cycle #7.  Further dose reduction of irinotecan to 150 mg/m  from cycle # 9-12.  5/2019-radiographic progression of the pancreatic mass  7/2019-a small 6 mm hypodense lesion in the right hepatic lobe initially evident in July 2018 has gradually enlarged to 1.9 cm along with gradually enlarging pancreatic head mass.  Image guided liver biopsy confirmed metastatic adenocarcinoma of pancreas.  7/24/2019-palliative intent C#1  gemcitabine, of chemotherapy since end of October 2019 due to profound fatigue      History of Present Illness    Ninfa present by himself today.    He decided to hold off chemotherapy for the last 6 weeks due to progressive fatigue and not feeling well.  He still have ongoing nausea and vomiting about 30 ounces a day and all are bilious.  Reported he has good appetite but he threw up after that.  Abdomen is bloated and very gaseous.  His pain across the mid abdomen has increased.  He is currently on morphine 50 mg twice a day and Dilaudid 2 mg a few times a day.  He ran out of Dilaudid a few weeks ago.    He continued to work a few days a week.  He worked 1 day last week for about 7 hours.  His work has been a very accommodating for him that as long as he come in for a couple hours, they are compensating him for full-time income.     He lives by himself.  He prepares his meals.  He has hard time keeping up with cleaning.  Denies any need for assistance in medication management.      Pain Status  Currently in Pain: Yes    Review of Systems    Constitutional  Constitutional (WDL): Exceptions to WDL  Fatigue: Concerns(worsening)  Neurosensory  Neurosensory (WDL): Exceptions to WDL  Peripheral Motor Neuropathy: Concerns(feet)  Eye   Eye Disorder (WDL): All eye disorder elements are within defined limits  Ear  Ear Disorder (WDL): All ear disorder elements are within defined limits  Cardiovascular  Cardiovascular (WDL): Exceptions to WDL  Edema: Concerns(feet and ankles)  Pulmonary  Respiratory (WDL): Within Defined Limits  Gastrointestinal  Gastrointestinal (WDL): Exceptions to WDL  Nausea: Concerns  Vomiting: Concerns  Constipation: Concerns  Genitourinary  Genitourinary (WDL): All genitourinary elements are within defined limits  Lymphatic  Lymph (WDL): All lymph disorder elements are within defined limits  Musculoskeletal and Connective Tissue  Musculoskeletal and Connetive Tissue Disorders (WDL): Exceptions to  WDL  Muscle Weakness : Concerns(legs and arms)  Integumentary  Integumentary (WDL): All integumentary elements are within defined limits  Patient Coping  Patient Coping: Accepting  Accompanied by  Accompanied by: Alone  Oral Chemo Adherence       Past History  Past Medical History:   Diagnosis Date     Diabetes mellitus (H)      Hypercholesteremia      Hypertension      Sleep apnea        Physical Exam    Recent Vitals 12/16/2019   Height -   Weight 122 lbs 14 oz   BSA (m2) 1.66 m2   /83   Pulse 100   Temp 97.7   Temp src 1   SpO2 96   Some recent data might be hidden     General: alert, awake, not in acute distress. Thin frail.  Cachectic.  HEENT: Head: Normal, normocephalic, atraumatic.  Eye: Normal external eye, conjunctiva, lids cornea, DASHAWN.  Ears:  Non-tender.  Nose: Normal external nose, mucus membranes and septum.  Pharynx: Dental Hygiene adequate. Normal buccal mucosa. Normal pharynx.  Neck / Thyroid: Supple, no masses, nodes, nodules or enlargement.  Lymphatics: No abnormally enlarged lymph nodes.  Chest: Normal chest wall and respirations. Clear to auscultation.  Heart: S1 S2 RRR, no murmur.   Abdomen: abdomen is protuberant with clinical ascites, soft without significant tenderness, masses, organomegaly or guarding  Extremities: normal strength, tone, and muscle mass  Skin: normal. no rash or abnormalities  CNS: non focal.    Lab Results    Recent Results (from the past 168 hour(s))   HM1 (CBC with Diff)   Result Value Ref Range    WBC 5.1 4.0 - 11.0 thou/uL    RBC 3.92 (L) 4.40 - 6.20 mill/uL    Hemoglobin 12.1 (L) 14.0 - 18.0 g/dL    Hematocrit 37.3 (L) 40.0 - 54.0 %    MCV 95 80 - 100 fL    MCH 30.9 27.0 - 34.0 pg    MCHC 32.4 32.0 - 36.0 g/dL    RDW 13.5 11.0 - 14.5 %    Platelets 168 140 - 440 thou/uL    MPV 9.8 8.5 - 12.5 fL    Neutrophils % 74 (H) 50 - 70 %    Lymphocytes % 16 (L) 20 - 40 %    Monocytes % 9 2 - 10 %    Eosinophils % 1 0 - 6 %    Basophils % 0 0 - 2 %    Neutrophils Absolute  3.7 2.0 - 7.7 thou/uL    Lymphocytes Absolute 0.8 0.8 - 4.4 thou/uL    Monocytes Absolute 0.5 0.0 - 0.9 thou/uL    Eosinophils Absolute 0.0 0.0 - 0.4 thou/uL    Basophils Absolute 0.0 0.0 - 0.2 thou/uL       Imaging    No results found.    Signed by: Xochitl Quick MD

## 2021-06-15 NOTE — ANESTHESIA CARE TRANSFER NOTE
Last vitals:   Vitals:    01/30/18 1616   BP: (!) 194/89   Pulse: 80   Resp: 14   Temp: 36.2  C (97.2  F)   SpO2: 94%     Patient's level of consciousness is drowsy  Spontaneous respirations: yes  Maintains airway independently: yes  Dentition unchanged: yes  Oropharynx: oropharynx clear of all foreign objects    QCDR Measures:  ASA# 20 - Surgical Safety Checklist: WHO surgical safety checklist completed prior to induction  PQRS# 430 - Adult PONV Prevention: 4558F - Pt received => 2 anti-emetic agents (different classes) preop & intraop  ASA# 8 - Peds PONV Prevention: NA - Not pediatric patient, not GA or 2 or more risk factors NOT present  PQRS# 424 - Lenora-op Temp Management: 4559F - At least one body temp DOCUMENTED => 35.5C or 95.9F within required timeframe  PQRS# 426 - PACU Transfer Protocol: - Transfer of care checklist used  ASA# 14 - Acute Post-op Pain: ASA14B - Patient did NOT experience pain >= 7 out of 10

## 2021-06-15 NOTE — ANESTHESIA POSTPROCEDURE EVALUATION
Patient: Gurpreet Gu  LAPAROTOMY, GASTROJEJUNOSTOMY, OPEN  Anesthesia type: general    Patient location: PACU  Last vitals:   Vitals:    01/30/18 1755   BP: (!) 173/95   Pulse: 81   Resp: 18   Temp:    SpO2: 94%     Post vital signs: stable  Level of consciousness: awake and responds to simple questions  Post-anesthesia pain: pain controlled  Post-anesthesia nausea and vomiting: no  Pulmonary: unassisted, return to baseline  Cardiovascular: stable and blood pressure at baseline  Hydration: adequate  Anesthetic events: no    QCDR Measures:  ASA# 11 - Lenora-op Cardiac Arrest: ASA11B - Patient did NOT experience unanticipated cardiac arrest  ASA# 12 - Lenora-op Mortality Rate: ASA12B - Patient did NOT die  ASA# 13 - PACU Re-Intubation Rate: ASA13B - Patient did NOT require a new airway mgmt  ASA# 10 - Composite Anes Safety: ASA10A - No serious adverse event    Additional Notes:  Recovery as anticipated from general anesthetic.  No complications.

## 2021-06-15 NOTE — ANESTHESIA PREPROCEDURE EVALUATION
Anesthesia Evaluation        Airway   Mallampati: III  Neck ROM: full   Pulmonary - normal exam   (+) sleep apnea on CPAP, ,                          Cardiovascular - normal exam  (+) hypertension, ,      Neuro/Psych - negative ROS     Endo/Other    (+) diabetes mellitus type 2,      GI/Hepatic/Renal    (+)   chronic renal disease CRI,           Dental    (+) poor dentition    Comment: Multiple missing teeth and global poor dentition                         Anesthesia Plan  Planned anesthetic: general endotracheal    ASA 3   Induction: intravenous   Anesthetic plan and risks discussed with: patient  Anesthesia plan special considerations: rapid sequence induction,   Post-op plan: routine recovery

## 2021-06-16 PROBLEM — C25.0: Status: ACTIVE | Noted: 2018-07-16

## 2021-06-16 PROBLEM — C78.7 PANCREATIC CANCER METASTASIZED TO LIVER (H): Status: ACTIVE | Noted: 2019-01-01

## 2021-06-16 PROBLEM — K92.2 GI BLEEDING: Status: ACTIVE | Noted: 2018-01-12

## 2021-06-16 PROBLEM — C25.9 PANCREATIC CANCER METASTASIZED TO LIVER (H): Status: ACTIVE | Noted: 2019-01-01

## 2021-06-16 PROBLEM — G47.00 PERSISTENT INSOMNIA: Status: ACTIVE | Noted: 2019-01-01

## 2021-06-16 PROBLEM — Z51.11 ENCOUNTER FOR ANTINEOPLASTIC CHEMOTHERAPY: Status: ACTIVE | Noted: 2018-07-23

## 2021-06-16 PROBLEM — K26.4 DUODENAL ULCER WITH HEMORRHAGE AND OBSTRUCTION: Status: ACTIVE | Noted: 2018-01-12

## 2021-06-16 PROBLEM — K80.20 GALL STONE: Status: ACTIVE | Noted: 2018-07-16

## 2021-06-16 PROBLEM — K83.1 OBSTRUCTIVE JAUNDICE (H): Status: ACTIVE | Noted: 2018-07-16

## 2021-06-16 PROBLEM — E87.6 HYPOKALEMIA: Status: ACTIVE | Noted: 2019-01-01

## 2021-06-16 PROBLEM — E86.0 DEHYDRATION: Status: ACTIVE | Noted: 2019-01-01

## 2021-06-16 PROBLEM — K81.9 CHOLECYSTITIS: Status: ACTIVE | Noted: 2019-01-01

## 2021-06-16 PROBLEM — K31.5 DUODENAL ULCER WITH HEMORRHAGE AND OBSTRUCTION: Status: ACTIVE | Noted: 2018-01-12

## 2021-06-16 PROBLEM — D50.9 ANEMIA, IRON DEFICIENCY: Status: ACTIVE | Noted: 2019-01-01

## 2021-06-16 PROBLEM — K83.09 CHOLANGITIS (H): Status: ACTIVE | Noted: 2019-01-01

## 2021-06-16 PROBLEM — K31.1 GASTRIC OUTLET OBSTRUCTION: Status: ACTIVE | Noted: 2018-01-12

## 2021-06-16 NOTE — TELEPHONE ENCOUNTER
Telephone Encounter by Almita Hogan RN at 8/5/2019  4:16 PM     Author: Almita Hogan RN Service: -- Author Type: Registered Nurse    Filed: 8/5/2019  4:17 PM Encounter Date: 8/5/2019 Status: Signed    : Almita Hogan RN (Registered Nurse)       Ken Eid, CNP  P  Medical Oncology Support Pool             Please inform Gurpreet his iron studies show iron deficiency.  See if he's ever tried oral iron.  If no we will begin oral iron twice daily with orange juice.  If tried oral iron and not tolerated we will need to bring him in for iron replete.  Let me know.  Wilda.      .lm

## 2021-06-16 NOTE — TELEPHONE ENCOUNTER
Telephone Encounter by Almita Hogan RN at 12/30/2019  9:32 AM     Author: Almita Hogan RN Service: -- Author Type: Registered Nurse    Filed: 12/30/2019  9:33 AM Encounter Date: 12/30/2019 Status: Signed    : Almita Hogan RN (Registered Nurse)       Cancer Care Refill Protocol Passed   JANUVIA 100 mg tablet [Pharmacy Med Name: JANUVIA 100MG TABLETS]   Rerun Protocol (12/30/2019 3:13 AM)      Cancer Care visit in the last 12 months      Last office visit: 12/16/2019 Xochitl Quick MD Next office visit within 3 mo: 1/7/2020 Xochitl Quick MD  Last MTM visit: Visit date not found    Prescriber or current provider: Xochitl Quick MD  Last diagnosis associated with med order:   1. Type 2 diabetes mellitus with complication, without long-term current use of insulin (H)  - JANUVIA 100 mg tablet [Pharmacy Med Name: JANUVIA 100MG TABLETS]; TAKE 1 TABLET BY MOUTH ONCE DAILY  Dispense: 30 tablet; Refill: 0

## 2021-06-16 NOTE — TELEPHONE ENCOUNTER
Telephone Encounter by Almita Hogan RN at 10/30/2019  9:14 AM     Author: Almita Hogan RN Service: -- Author Type: Registered Nurse    Filed: 10/30/2019  9:16 AM Encounter Date: 10/30/2019 Status: Signed    : Almita Hogan RN (Registered Nurse)       Cancer Care Refill Protocol Passed   JANUVIA 100 mg tablet [Pharmacy Med Name: JANUVIA 100MG TABLETS]   Rerun Protocol (10/30/2019 3:13 AM)      Cancer Care visit in the last 12 months      Last office visit: 9/25/2019 Xochitl Quick MD Next office visit within 3 mo: 11/1/2019 Ken Eid, CNP  Last MTM visit: Visit date not found    Prescriber or current provider: Xochitl Quick MD  Last diagnosis associated with med order:   1. Type 2 diabetes mellitus with complication, without long-term current use of insulin (H)  - JANUVIA 100 mg tablet [Pharmacy Med Name: JANUVIA 100MG TABLETS]; TAKE 1 TABLET BY MOUTH ONCE DAILY.  Dispense: 30 tablet; Refill: 0     2. Pancreatic insufficiency  - CREON 6,000-19,000 -30,000 unit CpDR capsule [Pharmacy Med Name: CREON 6,000 CAPSULES]; TAKE 2 CAPSULES BY MOUTH THREE TIMES DAILY WITH MEALS.  Dispense: 60 capsule; Refill: 0

## 2021-06-16 NOTE — TELEPHONE ENCOUNTER
Telephone Encounter by Almita Hogan RN at 1/8/2019 10:58 AM     Author: Almita Hogan RN Service: -- Author Type: Registered Nurse    Filed: 1/8/2019 10:59 AM Encounter Date: 1/8/2019 Status: Signed    : Almita Hogan RN (Registered Nurse)       Cancer Care Refill Protocol Passed   CREON 3,000-9,500- 15,000 unit CpDR [Pharmacy Med Name: CREON 3000UNIT CAPSULES]   Rerun Protocol (1/8/2019 8:53 AM)      Cancer Care visit in the last 12 months      Last office visit: 1/3/2019 Xochitl Quick MD Next office visit within 3 mo: 1/23/2019 Xochitl Quick MD  Last MTM visit: Visit date not found    Prescriber or current provider: Ken Eid CNP  Last diagnosis associated with med order: There are no diagnoses linked to this encounter.

## 2021-06-16 NOTE — TELEPHONE ENCOUNTER
Telephone Encounter by Almita Hogan RN at 12/17/2019  8:22 AM     Author: Almita Hogan RN Service: -- Author Type: Registered Nurse    Filed: 12/17/2019  8:47 AM Encounter Date: 12/17/2019 Status: Signed    : Almita Hogan RN (Registered Nurse)       Cancer Care Refill Protocol Passed   potassium chloride (K-DUR,KLOR-CON) 20 MEQ tablet [Pharmacy Med Name: POTASSIUM CL 20MEQ ER TABLETS]   Rerun Protocol (12/17/2019 3:11 AM)      Cancer Care visit in the last 12 months      Last office visit: 12/16/2019 Xochitl Qiuck MD Next office visit within 3 mo: Visit date not found  Last [facility] visit: Visit date not found    Prescriber or current provider: Xochitl Quick MD  Last diagnosis associated with med order:   1. Hypokalemia  - potassium chloride (K-DUR,KLOR-CON) 20 MEQ tablet [Pharmacy Med Name: POTASSIUM CL 20MEQ ER TABLETS]; TAKE 2 TABLETS( 40 MEQ TOTAL) BY MOUTH TWICE DAILY.  Dispense: 10 tablet; Refill: 0

## 2021-06-16 NOTE — TELEPHONE ENCOUNTER
Telephone Encounter by Almita Hogan RN at 11/4/2019  8:42 AM     Author: Almita Hogan RN Service: -- Author Type: Registered Nurse    Filed: 11/4/2019  8:43 AM Encounter Date: 11/2/2019 Status: Signed    : Almita Hogan RN (Registered Nurse)       Cancer Care Refill Protocol Passed   JARDIANCE 10 mg Tab [Pharmacy Med Name: JARDIANCE 10MG TABLETS]   Rerun Protocol (11/2/2019 3:14 AM)      Cancer Care visit in the last 12 months      Last office visit: 9/25/2019 Xochitl Quick MD Next office visit within 3 mo: Visit date not found  Last MTM visit: Visit date not found    Prescriber or current provider: Xochitl Quick MD  Last diagnosis associated with med order:   1. Type 2 diabetes mellitus with complication, without long-term current use of insulin (H)  - JARDIANCE 10 mg Tab [Pharmacy Med Name: JARDIANCE 10MG TABLETS]; TAKE 1 TABLET(10MG TOTAL) BY MOUTH DAILY  Dispense: 30 tablet; Refill: 0

## 2021-06-19 NOTE — ANESTHESIA PREPROCEDURE EVALUATION
Anesthesia Evaluation      Patient summary reviewed   No history of anesthetic complications     Airway   Mallampati: II  Neck ROM: limited   Pulmonary - normal exam   (+) sleep apnea,                          Cardiovascular - normal exam  (+) hypertension, ,      Neuro/Psych      Endo/Other    (+) diabetes mellitus,      GI/Hepatic/Renal    (+)   chronic renal disease,           Dental - normal exam                        Anesthesia Plan  Planned anesthetic: MAC    ASA 2     Anesthetic plan and risks discussed with: patient  Anesthesia plan special considerations: antiemetics,   Post-op plan: routine recovery

## 2021-06-19 NOTE — PROGRESS NOTES
Pt primary is Dr John Reed, spoke with his nurse and explained pt blood sugar today is 354 and pt has pancreatic cancer. Pt is currently taking oral  Diabetic meds but will need  sliding scale insulin ordered while getting his chemo treatments because part of his chemo regimen is to receive the steroid dexamethasone which will make his blood sugar higher. Pt does have a glucose monitor at home and knows how to check his blood sugar but has never given himself insulin before. Pt will be on dexamethasone for 3 days starting the first day after chemo for each chemo cycle.   Pt doctor is out of the office tomorrow but he will see his nurse practioner Rivka Whelan at 0915 on 8/8 at the [facility]. This appt information given to the pt and explained the necessity of the appt. Marjorie Mcginnis RN

## 2021-06-19 NOTE — PROGRESS NOTES
[facility] Hematology and Oncology Progress Note    Patient: Gurpreet Gu  MRN: [id number]  Date of Service: 8/8/2018         Reason for Visit:    D1C2 neoadjuvant FOLFIRINOX chemotherapy.    Assessment and Plan:    1) Malignant neoplasm of head of pancreas (H)     Stage IB (cT2, cN0, cM0)     63 y.o.     2018, April - evaluation of left lower quadrant pain:    05/03/18 CT A&P - new ill-defined hypodensity in the pancreatic head, mass measuring about 3.5 cm.     06/07/2018 FNA of the pancreatic head mass - positive for adenocarcinoma.    06/22/2018 PET/CT - ill-defined mass along the upper anterior margin of the pancreas adjacent to the duodenum with moderate FDG avidity, SUV max 4.7.  No additional sites of suspicious FDG avidity.    Borderline resectable pancreatic cancer (3.8 x 4.1 cm by CT and 43 x 33 mm pancreatic head mass.    07/19/18 biliary drain placed by IR.    07/24/18 D1C1 FOLFIRINOX (dose reduced 5-FU and irinotecan due to hyperbilirubinemia) + Neulasta.    08/08/18:    CBC - WBC, ANC and Plts WNL.  HgB very stable @ 13.1    CMP - mild hyponatremia.  Alk Phos down to 171.  AST and ALT WNL.  Bili down to 1.3.  Hyperglycemia.    Significantly improved hyperbilirubinemia from obstructive jaundice. Liver functions all nearly normalized.    Instructed on improved nutrition with supplements if needed.    Diabetes type 2:    BS today @ 354.      On Glucophage.    Most recent hemoglobin A1c from a month ago showed 8.1%.      Further elevations of blood glucose with steroid use for the next 3 days.    Will give 5u regular insulin x1.  Recheck BS at end of chemotherapy.  If > 400 give 10 u regular insulin.    Appointment with PCP made for tomorrow for sliding scale insulin while receiving chemotherapy.    Magnesium - WNL.    Continue oral magnesium oxide 400 mg once daily.    The patient looks and feels quite good today.  Fatigue is his biggest complaint.  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   He gets through his workday but requires rest when he gets home mid afternoon. He noted mild nausea managed with 4-5 antiemetics.  He had no emesis.  He noted quite significant cold hypersensitivity up until 4-5 days ago.  No other neuropathies or paresthesias.  Stable mid-abdominal pain which radiates to his back - managed with 2 mg dilaudid 3-4 xs/day as well as OxyContin 10 mg every 12 hours.  He states he has an appetite and believes he is eating good, but has lost 11 pounds since D1C1.  Mild loose stools with 5-6 stools/day past 4-5 weeks (post-op).  No diarrhea.    Biliary drain:    Once bilirubin has normalized and there is no drainage we will refer him back to IR to remove the drain.  States yet draining ~ 300 ml 5-6 times/day.    Proceed with D1C2 neoadjuvant FOLFIRINOX chemotherapy + Neulasta.    Will increase irinotecan and 5-FU to standard dosing today as bili now permits - discussed with [facility] pharmacist.    Patient instructed to contact us if temp > 100.4F as antibiotics and even hospitalization may be indicated.    08/22/18 - follow up D1C3 neoadjuvant FOLFIRINOX chemotherapy + Neulasta.    Treatment goal currently is curative with 6 cycles neoadjuvant FOLFIRINOX chemotherapy followed by reassessment for surgical resectability.      2) Medical cannabis:    Patient requested registry for pain - completed.      ECOG Performance:     ECOG Performance Status: 1     Distress Assessment:    Distress Assessment Score: No distress    Pain:    Currently in Pain: No/denies  Pain Score (Initial OR Reassessment): No/Denies Pain  Location: abdomen intermittent took pain meds this am        TT > 25 minutes face to face with > 50% counseling and care coordination.    Ken Eid, CNP   ______________________________________________    Interim History:    The patient looks and feels quite good today.  Fatigue is his biggest complaint.  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  He gets through his  workday but requires rest when he gets home mid afternoon. He noted mild nausea managed with 4-5 antiemetics.  He had no emesis.  He noted quite significant cold hypersensitivity up until 4-5 days ago.  No other neuropathies or paresthesias.  Stable mid-abdominal pain which radiates to his back - managed with 2 mg dilaudid 3-4 xs/day as well as OxyContin 10 mg every 12 hours.  He states he has an appetite and believes he is eating good, but has lost 11 pounds since D1C1.  Mild loose stools with 5-6 stools/day past 4-5 weeks (post-op).  He denies cough, fever, chills, unusual headaches, visual or mentation disturbance, bladder issues.    Review of Systems:    Constitutional  Constitutional (WDL): Exceptions to WDL  Fatigue: Fatigue relieved by rest  Weight Loss: to <10% from baseline, intervention not indicated  Neurosensory  Neurosensory (WDL): Exceptions to WDL  Peripheral Motor Neuropathy: Asymptomatic, clinical or diagnostic observations only, intervention not indicated  Ataxia: Asymptomatic, clinical or diagnostic observations only, intervention not indicated  Peripheral Sensory Neuropathy: Asymptomatic, loss of deep tendon reflexes or paresthesia (cold is terrible)  Eye   Eye Disorder (WDL): Exceptions to WDL  Blurred Vision: Intervention not indicated  Ear  Ear Disorder (WDL): All ear disorder elements are within defined limits  Cardiovascular  Cardiovascular (WDL): All cardiovascular elements are within defined limits  Pulmonary  Respiratory (WDL): Within Defined Limits  Gastrointestinal  Gastrointestinal (WDL): Exceptions to WDL  Anorexia: Loss of appetite without alteration in eating habits  Diarrhea: Increase of <4 stools per day over baseline, mild increase in ostomy output compared to baseline (not taking anything for the stools, 5 to 6 per day)  Nausea: Loss of appetite without alteration in eating habits  Dysgeusia: Altered taste but no change in diet (cold liquids not good and taste funny)  Dry Mouth:  "Symptomatic (e.g., dry or thick saliva) without significant dietary alteration, unstimulated saliva flow >0.2 ml/min  Genitourinary  Genitourinary (WDL): All genitourinary elements are within defined limits  Lymphatic  Lymph (WDL): All lymph disorder elements are within defined limits  Musculoskeletal and Connective Tissue  Musculoskeletal and Connetive Tissue Disorders (WDL): Exceptions to WDL  Arthralgia: Mild pain (left leg)  Muscle Weakness : Symptomatic, perceived by patient but not evident on physical exam  Integumentary  Integumentary (WDL): All integumentary elements are within defined limits  Patient Coping  Patient Coping: Accepting;Open/discussion  Distress Assessment  Distress Assessment Score: No distress  Accompanied by  Accompanied by: Alone  Oral Chemo Adherence       Past Histories:    Past Medical History:   Diagnosis Date     Diabetes mellitus (H)      Hypercholesteremia      Hypertension      Sleep apnea        Physical Exam:    Recent Vitals 8/8/2018   Height 5' 10\"   Weight 173 lbs 2 oz   BSA (m2) 1.97 m2   /67   Pulse 107   Temp 98.9   Temp src 1   SpO2 97   Some recent data might be hidden     General:  Alert.  Pleasant.  Not acute distress  HEENT:  Anicteric sclera.  DASHAWN.  EOMI.  No mucosal lesions.  Lymphatics:  No abnormally enlarged lymph nodes.  Chest:   Lungs clear to auscultation.  Heart:   S1 S2 heard.  RRR.  No murmur heard.   Abdomen:  Soft without significant tenderness, masses, organomegaly or guarding.  Biliary drain present and draining - dressing dry.  Extremities:  No edema.  Skin:   No rash observed.  CNS:   Non focal.    Lab Results:    Reviewed with patient.    Recent Results (from the past 168 hour(s))   Magnesium   Result Value Ref Range    Magnesium 1.9 1.8 - 2.6 mg/dL   Comprehensive Metabolic Panel   Result Value Ref Range    Sodium 135 (L) 136 - 145 mmol/L    Potassium 4.3 3.5 - 5.0 mmol/L    Chloride 102 98 - 107 mmol/L    CO2 22 22 - 31 mmol/L    Anion Gap, " Calculation 11 5 - 18 mmol/L    Glucose 354 (H) 70 - 125 mg/dL    BUN 6 (L) 8 - 22 mg/dL    Creatinine 0.96 0.70 - 1.30 mg/dL    GFR MDRD Af Amer >60 >60 mL/min/1.73m2    GFR MDRD Non Af Amer >60 >60 mL/min/1.73m2    Bilirubin, Total 1.3 (H) 0.0 - 1.0 mg/dL    Calcium 9.1 8.5 - 10.5 mg/dL    Protein, Total 6.6 6.0 - 8.0 g/dL    Albumin 3.7 3.5 - 5.0 g/dL    Alkaline Phosphatase 171 (H) 45 - 120 U/L    AST 15 0 - 40 U/L    ALT 45 0 - 45 U/L   HM1 (CBC with Diff)   Result Value Ref Range    WBC 7.6 4.0 - 11.0 thou/uL    RBC 4.08 (L) 4.40 - 6.20 mill/uL    Hemoglobin 13.1 (L) 14.0 - 18.0 g/dL    Hematocrit 38.4 (L) 40.0 - 54.0 %    MCV 94 80 - 100 fL    MCH 32.1 27.0 - 34.0 pg    MCHC 34.1 32.0 - 36.0 g/dL    RDW 14.3 11.0 - 14.5 %    Platelets 158 140 - 440 thou/uL    MPV 10.3 8.5 - 12.5 fL    Neutrophils % 71 (H) 50 - 70 %    Lymphocytes % 19 (L) 20 - 40 %    Monocytes % 9 2 - 10 %    Eosinophils % 1 0 - 6 %    Basophils % 1 0 - 2 %    Neutrophils Absolute 5.3 2.0 - 7.7 thou/uL    Lymphocytes Absolute 1.4 0.8 - 4.4 thou/uL    Monocytes Absolute 0.7 0.0 - 0.9 thou/uL    Eosinophils Absolute 0.1 0.0 - 0.4 thou/uL    Basophils Absolute 0.0 0.0 - 0.2 thou/uL       Imaging    No new imaging.

## 2021-06-19 NOTE — PROGRESS NOTES
Pt amb into clinic for FOLFIRINOX infusion. Reviewed plan of care. Educated pt on insulin, reasons for high blood sugar. Pt to meet with Diabetic Educator tomorrow. Pt tolerated oxaliplatin well except for cold sensitivity present by end of infusion. Pt tolerated irinotecan infusion well. Report given to Gloria WADSWORTH for 5-fu administration.

## 2021-06-19 NOTE — ANESTHESIA POSTPROCEDURE EVALUATION
Patient: Gurpreet Gu  INSERTION, VASCULAR ACCESS PORT  Anesthesia type: MAC    Patient location: PACU (prior to transport)  Last vitals:   Vitals:    07/20/18 1309   BP: 162/80   Pulse: 73   Resp: 16   Temp: 36.9  C (98.4  F)   SpO2: 96%     Post vital signs: stable  Level of consciousness: awake and responds to simple questions  Post-anesthesia pain: pain controlled  Post-anesthesia nausea and vomiting: no  Pulmonary: unassisted  Cardiovascular: stable  Hydration: adequate  Anesthetic events: no    QCDR Measures:  ASA# 11 - Lenora-op Cardiac Arrest: ASA11B - Patient did NOT experience unanticipated cardiac arrest  ASA# 12 - Lenora-op Mortality Rate: ASA12B - Patient did NOT die  ASA# 13 - PACU Re-Intubation Rate: NA - No ETT / LMA used for case  ASA# 10 - Composite Anes Safety: ASA10A - No serious adverse event    Additional Notes:

## 2021-06-19 NOTE — PROGRESS NOTES
In Basket message received for Medical Oncology Consult, referal received from Dr Magana. Placed call to Gurpreet to schedule appointment. Same scheduled for Wednesday July 18th 2018 at 2:00 pm with Dr Quick, with a nurse appt at 10:30 am.     E mail and mail packet sent to Gurpreet with Introduction letter, MD bio card, [facility] intake form, medication and allergy list, release of information, and appointment letter.    Work up for pancreatic cancer on head of pancreas has been done at [facility], and with Dr Magana.  Medical records to ensure patient records in order for appointment. Please get records from Salbador Perez from PCP John Reed from 5/4/2018 to present     Explained that I would be Gurpreet's nurse navigator.  I generally provide assistance with psycho social needs including but not limited to, financial assistance, obtaining education materials, transportation assistance, help with meals, community programs, and help finding support or support groups, getting connected with our psychotherapist when needed.  I am also here to help guide you through our system.      I am in the office Monday thru Friday.  I am in and out of my office throughout the day, If I do not answer my phone please leave me a message and I will get back to you as soon as possible. If you are ever unsure of who to call you can always contact me and I will help assist you in any way that I can.  Medication or symptom management needs typically go through our triage nurse who can be reached by calling the main clinic number. [phone]    Please do not hesitate to contact me if you have any questions or concerns.      Gurpreet asked to arrive at 1:30pm at [facility] and to have health history form and medication and allergy list completed prior to arrival and to bring same to appointment.

## 2021-06-19 NOTE — ANESTHESIA PREPROCEDURE EVALUATION
Anesthesia Evaluation      Patient summary reviewed   No history of anesthetic complications     Airway   Mallampati: III  Neck ROM: full   Pulmonary - normal exam   (+) sleep apnea, a smoker                         Cardiovascular - normal exam  Exercise tolerance: < 4 METS  (+) hypertension, ,      Neuro/Psych - negative ROS     Endo/Other    (+) diabetes mellitus type 2,      GI/Hepatic/Renal    (+)   chronic renal disease,      Other findings: Duodenal ulcer with hemorrhage and obstruction     GI bleeding    Benign essential hypertension    Acute renal failure (H)    Type 2 diabetes mellitus with complication, unspecified long term insulin use status (H)    Lactic acidosis    Hypernatremia    Gastric outlet obstruction    Type 2 diabetes mellitus with complication, without long-term current use of insulin (H)    Dyslipidemia    HTN (hypertension)    On total parenteral nutrition (TPN)    Diabetes mellitus type 2 in obese (H)    Gram-negative bacteremia    Bloodstream infection associated with central venous catheter, subsequent encounter    S/P bypass gastrojejunostomy    Obstructive jaundice due to cancer (H)    Gall stone    Malignant neoplasm of head of pancreas (H)             Dental    (+) poor dentition    Comment: Multiple missing teeth and global poor dentition                         Anesthesia Plan  Planned anesthetic: general endotracheal  Patient is rating pain as very uncomfortable, will given fentanyl in pre-op  ASA 4   Induction: intravenous   Anesthetic plan and risks discussed with: patient  Anesthesia plan special considerations: video-assisted,   Post-op plan: routine recovery

## 2021-06-19 NOTE — ANESTHESIA CARE TRANSFER NOTE
Last vitals:   Vitals:    07/18/18 1657   BP: 172/79   Pulse: 86   Resp: 16   Temp: 36.2  C (97.2  F)   SpO2: 100%     Patient's level of consciousness is drowsy  Spontaneous respirations: yes  Maintains airway independently: yes  Dentition unchanged: yes  Oropharynx: oropharynx clear of all foreign objects    QCDR Measures:  ASA# 20 - Surgical Safety Checklist: WHO surgical safety checklist completed prior to induction  PQRS# 430 - Adult PONV Prevention: 4558F - Pt received => 2 anti-emetic agents (different classes) preop & intraop  ASA# 8 - Peds PONV Prevention: NA - Not pediatric patient, not GA or 2 or more risk factors NOT present  PQRS# 424 - Lenora-op Temp Management: 4559F - At least one body temp DOCUMENTED => 35.5C or 95.9F within required timeframe  PQRS# 426 - PACU Transfer Protocol: - Transfer of care checklist used  ASA# 14 - Acute Post-op Pain: ASA14B - Patient did NOT experience pain >= 7 out of 10

## 2021-06-19 NOTE — PROGRESS NOTES
[facility] Hematology and Oncology Progress Note    Patient: Gurpreet Gu  MRN: [id number]  Date of Service: 07/24/2018        Reason for Visit    Chief Complaint   Patient presents with     HE Cancer     Pancreatic Cancer       Assessment and Plan  Malignant neoplasm of head of pancreas (H)    Staging form: Exocrine Pancreas, AJCC 8th Edition    - Clinical stage from 7/24/2018: Stage IB (cT2, cN0, cM0) - Signed by Xochitl Quick MD on 7/24/2018    ECOG Performance   ECOG Performance Status: 0     Distress Assessment  Distress Assessment Score: No distress    Pain  Currently in Pain: Yes  Pain Score (Initial OR Reassessment): 2  Location: abd radiates to lower back    #.  Borderline resectable pancreatic cancer (3.8 x 4.1 cm by CT and 43 x 33 mm pancreatic head mass   I reviewed his scan, pathology results.  Treatment goal currently is curative with neoadjuvant chemotherapy.  He will receive total 6 cycles of FOLFIRINOX and reassess for surgical resectability at that point.   Labs were reviewed.  He still have elevated hyperbilirubinemia from obstructive jaundice although it has significantly improved.  I will dose reduce 5-FU to 50% (1400 mg/m  from 28 mg/m ), irinotecan dose to 135 mg/m  from 180 mg/m .  Oxaliplatin dose would not need reduction.  Once his liver function normalized, we will increase to standard dose as tolerated.   He will receive Neulasta support as well.   Follow-up in 2 weeks for cycle #2 chemotherapy.    #.  Obstructive jaundice   Not amendable for endoscopic stent placement.  He currently have a biliary drain placed by IR on 7/19/2018.   Plan is to monitor the drain output and bilirubin.  Once bilirubin normalized and knows drainage, we will refer him back to IR to remove the drain.  Wound care educated today.  He declined home health services.     #.  Hypomagnesemia.   IV magnesium sulfate 2 g ×1 today.  He is advised to start oral magnesium oxide 400 mg twice daily.    #.  Diabetes type  2.   Most recent hemoglobin A1c from a month ago showed 8.1%.  She knew is home antidiabetic medication.  Warned about elevated blood glucose with steroid use for the next 3 days.     Problem List    1. Encounter for antineoplastic chemotherapy  prochlorperazine (COMPAZINE) 10 MG tablet    dexamethasone (DECADRON) 4 MG tablet    lidocaine-prilocaine (EMLA) cream    UGT1A1 TA Repeat Genotype    CC OFFICE VISIT LONG    Infusion Appointment    NURSE VISIT    CC OFFICE VISIT LONG    Infusion Appointment    Carbohydrate Antigen 19-9 (CA 19-9)    Carbohydrate Antigen 19-9 (CA 19-9)    DISCONTINUED: sodium chloride 0.9% 250 mL infusion    DISCONTINUED: palonosetron injection 0.25 mg (ALOXI)    DISCONTINUED: fosaprepitant 150 mg, dexamethasone 12 mg in sodium chloride 0.9% 150 mL    DISCONTINUED: atropine injection 0.5 mg    DISCONTINUED: oxaliplatin 180 mg in dextrose 5% 250 mL chemo (ELOXATIN)    DISCONTINUED: dextrose 5% 500 mL infusion    DISCONTINUED: irinotecan 280 mg in dextrose 5% 500 mL chemo (CAMPTOSAR)    DISCONTINUED: leucovorin 800 mg in dextrose 5% 250 mL (WELLCOVORIN)    DISCONTINUED: fluorouracil 2,400 mg/m2 = 5,000 mg in sodium chloride 0.9% 242 mL (ADRUCIL)    DISCONTINUED: diphenhydrAMINE injection 50 mg (BENADRYL)    DISCONTINUED: famotidine 20 mg/2 mL injection 20 mg (PEPCID)    DISCONTINUED: hydrocortisone sod succ (PF) 100 mg/2 mL injection 100 mg    DISCONTINUED: acetaminophen tablet 1,000 mg (TYLENOL)   2. Malignant neoplasm of head of pancreas (H)  prochlorperazine (COMPAZINE) 10 MG tablet    dexamethasone (DECADRON) 4 MG tablet    lidocaine-prilocaine (EMLA) cream    UGT1A1 TA Repeat Genotype    CC OFFICE VISIT LONG    Infusion Appointment    NURSE VISIT    CC OFFICE VISIT LONG    Infusion Appointment    Carbohydrate Antigen 19-9 (CA 19-9)    Carbohydrate Antigen 19-9 (CA 19-9)    DISCONTINUED: sodium chloride 0.9% 250 mL infusion    DISCONTINUED: palonosetron injection 0.25 mg (ALOXI)     DISCONTINUED: fosaprepitant 150 mg, dexamethasone 12 mg in sodium chloride 0.9% 150 mL    DISCONTINUED: atropine injection 0.5 mg    DISCONTINUED: oxaliplatin 180 mg in dextrose 5% 250 mL chemo (ELOXATIN)    DISCONTINUED: dextrose 5% 500 mL infusion    DISCONTINUED: irinotecan 280 mg in dextrose 5% 500 mL chemo (CAMPTOSAR)    DISCONTINUED: leucovorin 800 mg in dextrose 5% 250 mL (WELLCOVORIN)    DISCONTINUED: fluorouracil 2,400 mg/m2 = 5,000 mg in sodium chloride 0.9% 242 mL (ADRUCIL)    DISCONTINUED: diphenhydrAMINE injection 50 mg (BENADRYL)    DISCONTINUED: famotidine 20 mg/2 mL injection 20 mg (PEPCID)    DISCONTINUED: hydrocortisone sod succ (PF) 100 mg/2 mL injection 100 mg    DISCONTINUED: acetaminophen tablet 1,000 mg (TYLENOL)   3. CA head of pancreas (H)  prochlorperazine (COMPAZINE) 10 MG tablet    dexamethasone (DECADRON) 4 MG tablet    lidocaine-prilocaine (EMLA) cream    UGT1A1 TA Repeat Genotype    CC OFFICE VISIT LONG    Infusion Appointment    NURSE VISIT    CC OFFICE VISIT LONG    Infusion Appointment    Carbohydrate Antigen 19-9 (CA 19-9)    Carbohydrate Antigen 19-9 (CA 19-9)    DISCONTINUED: sodium chloride 0.9% 250 mL infusion    DISCONTINUED: palonosetron injection 0.25 mg (ALOXI)    DISCONTINUED: fosaprepitant 150 mg, dexamethasone 12 mg in sodium chloride 0.9% 150 mL    DISCONTINUED: atropine injection 0.5 mg    DISCONTINUED: oxaliplatin 180 mg in dextrose 5% 250 mL chemo (ELOXATIN)    DISCONTINUED: dextrose 5% 500 mL infusion    DISCONTINUED: irinotecan 280 mg in dextrose 5% 500 mL chemo (CAMPTOSAR)    DISCONTINUED: leucovorin 800 mg in dextrose 5% 250 mL (WELLCOVORIN)    DISCONTINUED: fluorouracil 2,400 mg/m2 = 5,000 mg in sodium chloride 0.9% 242 mL (ADRUCIL)    DISCONTINUED: diphenhydrAMINE injection 50 mg (BENADRYL)    DISCONTINUED: famotidine 20 mg/2 mL injection 20 mg (PEPCID)    DISCONTINUED: hydrocortisone sod succ (PF) 100 mg/2 mL injection 100 mg    DISCONTINUED: acetaminophen  tablet 1,000 mg (TYLENOL)      ______________________________________________________________________________  Diagnosis  May 2018- new ill-defined hypodensity in the pancreatic head mass measuring about 3.5 cm by CT scan upon evaluation of left lower quadrant pain.  6/7/2018-FNA of the pancreatic head mass was positive for adenocarcinoma.  6/22/2018: PET/CT scan show ill-defined mass along the upper anterior margin of the pancreas adjacent to the duodenum with moderate FDG avidity, SUV max 4.7.  No additional sites of suspicious FDG avidity.    Treatment to date  7/24/2018 -neoadjuvant chemotherapy with FOLFIRINOX.    History of Present Illness    Gurpreet presents today for first cycle of chemotherapy.  He attended chemo class.  He does not have any further question.  Overall feeling okay.  He reported yellowish skin and I color has significantly decreased.  He still have a biliary drain which is a putting about 300 ml per day.  No fever or infection symptoms.    Pain Status  Currently in Pain: Yes    Review of Systems    Constitutional  Constitutional (WDL): Exceptions to WDL  Fatigue: None  Fever: None  Chills: None  Weight Gain: None  Weight Loss: to <10% from baseline, intervention not indicated (5# 7/18/18)  Neurosensory  Neurosensory (WDL): All neurosensory elements are within defined limits  Eye   Eye Disorder (WDL): Exceptions to WDL  Blurred Vision: Intervention not indicated (intermittent floaters)  Dry Eye: None  Eye Pain: None  Watering Eyes: None  Ear  Ear Disorder (WDL): All ear disorder elements are within defined limits  Cardiovascular  Cardiovascular (WDL): All cardiovascular elements are within defined limits  Pulmonary  Respiratory (WDL): Exceptions to WDL  Cough: None  Dyspnea: Shortness of breath with moderate exertion (occ, going up stairs)  Hypoxia: None  Gastrointestinal  Gastrointestinal (WDL): Exceptions to WDL (abd pain radiates to back)  Anorexia: None  Constipation: None  Diarrhea:  "Increase of <4 stools per day over baseline, mild increase in ostomy output compared to baseline (3-4/day)  Dysphagia: None  Esophagitis: Symptomatic, altered eating/swallowing, oral supplements indicated (omeprazole)  Nausea: None  Pharyngitis: None  Vomiting: None  Dysgeusia: None  Dry Mouth: Symptomatic (e.g., dry or thick saliva) without significant dietary alteration, unstimulated saliva flow >0.2 ml/min (\"sometimes\")  Genitourinary  Genitourinary (WDL): All genitourinary elements are within defined limits  Lymphatic  Lymph (WDL): All lymph disorder elements are within defined limits  Musculoskeletal and Connective Tissue  Musculoskeletal and Connetive Tissue Disorders (WDL): All Musculoskeletal and Connetive Tissue Disorder elements are within defined limits  Integumentary  Integumentary (WDL): Exceptions to WDL (bruising to R FA)  Alopecia: None  Rash Maculo-Papular: None  Pruritus: None  Urticaria: None  Palmar-Plantar Erythrodysesthesia Syndrome: None  Flushing: None  Patient Coping  Patient Coping: Accepting  Distress Assessment  Distress Assessment Score: No distress  Accompanied by  Accompanied by: Alone  Oral Chemo Adherence       Past History  Past Medical History:   Diagnosis Date     Diabetes mellitus (H)      Hypercholesteremia      Hypertension      Sleep apnea        Physical Exam    Recent Vitals 7/24/2018   Height 5' 10\"   Weight 184 lbs 13 oz   BSA (m2) 2.03 m2   /68   Pulse 97   Temp 98.9   Temp src -   SpO2 95   Some recent data might be hidden     General: alert, awake, not in acute distress  HEENT: Head: Normal, normocephalic, atraumatic.  Eye: Normal external eye, DASHAWN.  Yellowish conjunctiva.  Ears: Non-tender.  Nose: Normal external nose, mucus membranes and septum.  Pharynx:  Normal pharynx.  Neck / Thyroid: Supple, no masses, nodes, nodules or enlargement.  Lymphatics: No abnormally enlarged lymph nodes.  Chest: Normal chest wall and respirations. Clear to auscultation.  Heart: " S1 S2 RRR, no murmur.   Abdomen: abdomen is soft without significant tenderness, masses, organomegaly or guarding.  Biliary drain present.  Extremities: normal strength, tone, and muscle mass  Skin: normal. no rash or abnormalities  CNS: non focal.    Lab Results    Recent Results (from the past 168 hour(s))   POCT Glucose   Result Value Ref Range    Glucose,  mg/dL   POCT Glucose   Result Value Ref Range    Glucose,  mg/dL   POCT Glucose   Result Value Ref Range    Glucose,  mg/dL   POCT Glucose   Result Value Ref Range    Glucose,  mg/dL   POCT Glucose   Result Value Ref Range    Glucose,  mg/dL   POCT Glucose   Result Value Ref Range    Glucose,  mg/dL   Protime-INR   Result Value Ref Range    INR 0.96 0.90 - 1.10   POCT Glucose   Result Value Ref Range    Glucose,  mg/dL   POCT Glucose   Result Value Ref Range    Glucose,  mg/dL   POCT Glucose   Result Value Ref Range    Glucose,  mg/dL   POCT Glucose   Result Value Ref Range    Glucose,  mg/dL   POCT Glucose   Result Value Ref Range    Glucose,  mg/dL   POCT Glucose   Result Value Ref Range    Glucose,  mg/dL   Platelet Count   Result Value Ref Range    Platelets 188 140 - 440 thou/uL   POCT Glucose   Result Value Ref Range    Glucose,  mg/dL   POCT Glucose   Result Value Ref Range    Glucose,  mg/dL   Hepatic Profile   Result Value Ref Range    Bilirubin, Total 4.1 (H) 0.0 - 1.0 mg/dL    Bilirubin, Direct 2.1 (H) <=0.5 mg/dL    Protein, Total 5.5 (L) 6.0 - 8.0 g/dL    Albumin 2.7 (L) 3.5 - 5.0 g/dL    Alkaline Phosphatase 307 (H) 45 - 120 U/L    AST 89 (H) 0 - 40 U/L     (H) 0 - 45 U/L   HM2(CBC w/o Differential)   Result Value Ref Range    WBC 6.9 4.0 - 11.0 thou/uL    RBC 3.72 (L) 4.40 - 6.20 mill/uL    Hemoglobin 11.8 (L) 14.0 - 18.0 g/dL    Hematocrit 33.7 (L) 40.0 - 54.0 %    MCV 91 80 - 100 fL    MCH 31.7 27.0 - 34.0 pg    MCHC 35.0 32.0 - 36.0 g/dL     RDW 16.7 (H) 11.0 - 14.5 %    Platelets 189 140 - 440 thou/uL    MPV 10.9 8.5 - 12.5 fL   POCT Glucose   Result Value Ref Range    Glucose,  mg/dL   Magnesium   Result Value Ref Range    Magnesium 1.5 (L) 1.8 - 2.6 mg/dL   Comprehensive Metabolic Panel   Result Value Ref Range    Sodium 141 136 - 145 mmol/L    Potassium 3.7 3.5 - 5.0 mmol/L    Chloride 104 98 - 107 mmol/L    CO2 25 22 - 31 mmol/L    Anion Gap, Calculation 12 5 - 18 mmol/L    Glucose 283 (H) 70 - 125 mg/dL    BUN 9 8 - 22 mg/dL    Creatinine 0.84 0.70 - 1.30 mg/dL    GFR MDRD Af Amer >60 >60 mL/min/1.73m2    GFR MDRD Non Af Amer >60 >60 mL/min/1.73m2    Bilirubin, Total 3.2 (H) 0.0 - 1.0 mg/dL    Calcium 9.5 8.5 - 10.5 mg/dL    Protein, Total 6.6 6.0 - 8.0 g/dL    Albumin 3.3 (L) 3.5 - 5.0 g/dL    Alkaline Phosphatase 227 (H) 45 - 120 U/L    AST 54 (H) 0 - 40 U/L     (H) 0 - 45 U/L   HM1 (CBC with Diff)   Result Value Ref Range    WBC 4.9 4.0 - 11.0 thou/uL    RBC 3.91 (L) 4.40 - 6.20 mill/uL    Hemoglobin 12.6 (L) 14.0 - 18.0 g/dL    Hematocrit 37.1 (L) 40.0 - 54.0 %    MCV 95 80 - 100 fL    MCH 32.2 27.0 - 34.0 pg    MCHC 34.0 32.0 - 36.0 g/dL    RDW 15.6 (H) 11.0 - 14.5 %    Platelets 289 140 - 440 thou/uL    MPV 10.0 8.5 - 12.5 fL    Neutrophils % 72 (H) 50 - 70 %    Lymphocytes % 14 (L) 20 - 40 %    Monocytes % 12 (H) 2 - 10 %    Eosinophils % 2 0 - 6 %    Basophils % 0 0 - 2 %    Neutrophils Absolute 3.5 2.0 - 7.7 thou/uL    Lymphocytes Absolute 0.7 (L) 0.8 - 4.4 thou/uL    Monocytes Absolute 0.6 0.0 - 0.9 thou/uL    Eosinophils Absolute 0.1 0.0 - 0.4 thou/uL    Basophils Absolute 0.0 0.0 - 0.2 thou/uL       Imaging    Xr Chest 1 View Portable    Result Date: 7/20/2018  XR CHEST 1 VIEW PORTABLE 7/20/2018 3:16 PM INDICATION: Check line placement COMPARISON: 2/10/2018 FINDINGS: New left Port-A-Cath tip in the SVC. Chronic appearing atelectasis in both mid and lower lungs fairly similar to prior study. No new findings in the  lungs. No pneumothorax.    Xr Chest 1 View Portable    Result Date: 7/20/2018  XR CHEST 1 VIEW PORTABLE 7/20/2018 12:38 PM INDICATION: In surgery, port a cath placement COMPARISON: None. FINDINGS: Single, intraoperative spot film was obtained which demonstrates what appears to be a left-sided central venous catheter with tip in the region of the innominate vein confluence.    Xr Ercp W Fluoro    Result Date: 7/18/2018  XR ERCP BILIARY ONLY 7/18/2018 4:49 PM INDICATION: Bilairy obstruction TECHNIQUE: Exam performed by gastroenterologist. COMPARISON: CT abdomen and pelvis 07/16/2018 FLUOROSCOPIC TIME: 1.6 minutes NUMBER OF IMAGES: 6 FINDINGS: BILE DUCTS: Endoscope and catheter are seen but no opacified bile ducts are visualized on the submitted images. PANCREATIC DUCT: Not imaged.     CONCLUSION: 1.  Fluoroscopic support for ERCP. Please refer to the operative/procedural report.    Xr C Arm Less Than One Hour    Result Date: 7/20/2018  Please see Operative or Procedure Note for details on this exam or Radiology Report for body part of interest (if applicable).     Ct Abdomen Pelvis With Oral With Iv Contrast    Result Date: 7/17/2018  CT ABDOMEN PELVIS W ORAL W IV CONTRAST 7/16/2018 11:52 PM     INDICATION: Pancreatic cancer. Possible portal vein thrombosis on ultrasound. TECHNIQUE: CT abdomen and pelvis. Multiplanar reformation images (MPR). Dose reduction techniques were used. IV CONTRAST: Iohexol (Omni) 100 mL COMPARISON: 06/15/2018 FINDINGS: LUNG BASES: Basilar fibroatelectasis. ABDOMEN: Gastric bypass. Pancreatic mass approximately 3.8 x 4.1 cm in size series 2 image 50 (4.1 x 4 cm prior). Mass again noted to abut the duodenum gastroduodenal artery was noted to course through the mass on the prior exam but is not well seen on the current study. There is interval development of intra and extrahepaticdilatation biliary to the level of the mass. Upper normal pancreatic duct. Spleen, adrenals and kidneys are stable.  Normal caliber bowel. Hyperdense appearance the gallbladder. Sludge or cholelithiasis not excluded. PELVIS: Diverticulosis. MUSCULOSKELETAL: Degenerative change osseous structures.     CONCLUSION: 1.  Pancreatic mass is again seen. Interval development of biliary dilatation. 2.  The portal vein is patent. 3.  Diverticulosis.    Us Abdomen Limited    Result Date: 7/16/2018  US ABDOMEN LIMITED 7/16/2018 7:03 PM INDICATION: Jaundice and abd pain; recent diagnosis of pancreatic cancer COMPARISON: PET/CT dated 6/22/2018. FINDINGS: GALLBLADDER: Hyperechoic lumen of the gallbladder. Negative sonographic Maldonado sign. BILE DUCTS: There is intrahepatic and extrahepatic bile duct dilation. The common bile duct measures 14  mm. LIVER: Normal where seen. RIGHT KIDNEY: No hydronephrosis. PANCREAS: Not imaged in detail on this limited study. No ascites in the right upper quadrant. The main portal vein is enlarged. Portal vein thrombus is not excluded. There is normal hepatopedal flow.     CONCLUSION: 1.  Intrahepatic and extrahepatic bile duct dilatation. Biliary obstruction is possible. 2.  Hyperechoic gallbladder, suggestive of stones or sludge. Negative sonographic Maldonado's sign. 3.  Possible portal venous system thrombus, which would be better assessed with CT or MRI. NOTE: ABNORMAL REPORT THE DICTATION ABOVE DESCRIBES AN ABNORMALITY FOR WHICH FOLLOW-UP IS NEEDED.     Ir Biliary Drainage    Result Date: 7/19/2018  [facility] 7/19/2018 PROCEDURE: 1.  PLACEMENT OF BILIARY STENT, NEW ACCESS WITH BILIARY TUBE 2.  CONSCIOUS SEDATION INTERVENTIONAL RADIOLOGIST: Heri Winkler MD INDICATION: Pancreatic cancer. Malignant biliary obstruction with dilated bile ducts and jaundice. History of gastrojejunostomy. SEDATION: Versed 4 mg. Fentanyl 300 mcg. The procedure was performed with administration intravenous conscious sedation with appropriate preoperative, intraoperative, and postoperative evaluation. During the timeout,  immediately prior to administration of medications, the patient was reassessed for adequacy to receive conscious sedation. 45 minutes of supervised face to face conscious sedation time was provided by a radiology nurse under my direct supervision. ANTIBIOTICS: And 7 2 g IV Air Kerma: 526 mGy FLUOROSCOPIC TIME: 10.6 minutes CONTRAST: 75 mL Omnipaque 350 COMPLICATIONS: No immediate complications. PROCEDURE: Informed sent was obtained. The upper abdomen was prepped and draped in the usual sterile fashion followed by local anesthesia with 1% Xylocaine. Ultrasound revealed dilated intrahepatic bile ducts on the right and the left. An ultrasound image was obtained and placed in the patient's permanent medical record. Using real-time ultrasound for needle placement a needle was used to access a peripheral left bile duct. Contrast was injected and cholangiogram images are obtained. A stiff Glidewire and KMP  catheter advanced centrally to the level of the obstruction. A stiff Glidewire and catheter were advanced through the complete occlusion of the proximal common bile duct. Wire access was secured in the duodenum. The common bile duct obstruction was treated with a 10 mm x 6 cm Viabil stentgraft and a coaxial 8 x 50 mm Viabahn stentgraft. Both stents were balloon dilated using a 7 mm balloon. Follow-up cholangiogram was performed. A 10 Tuvaluan locking external biliary drain was placed and maintained to gravity drainage. FINDINGS: Marked dilation of intrahepatic and central bile ducts with complete obstruction of the proximal common bile duct. The distal common bile duct is patent. Common bile duct obstruction treated with covered stents as noted above and postdilated to 7 mm. Antegrade flow into the duodenum was reestablished. A 10 Tu[place] external biliary drain was maintained and open to gravity drainage pending drop in bilirubin.     Malignant obstruction proximal common bile duct with biliary obstruction and dilation.  Successful covered stent placement CBD obstruction with reestablished in-line flow to the duodenum. 10 Maltese external biliary drain was maintained to gravity drainage  pending significant drop in bilirubin. If there is significant downward trending of bilirubin, consider tube check and possible removal. CPT codes for physician reference only: 68446         Signed by: Xochitl Quick MD

## 2021-06-19 NOTE — PROGRESS NOTES
Per Dr. Quick's request, called [facility] to see who will manage bili tube.  Per ANGELICA, [facility] to manage.  I called and spoke with [facility].  She said that as long as it's draining, nothing needs to be done.  She said tube will need to be changed every 3 mo.  Contact [facility] to schedule.  She also said if tube no longer draining to contact them and patient will need to be seen. She said dressing changes every 3-4 days and dressing supplies can just be purchased at any drug store.  Dr. Quick updated.  Jessi, Infusion nurse updated and will do today's dressing change, prior to patient leaving today.  Patient also updated and verbalized understanding/SERENITY Bang RN

## 2021-06-19 NOTE — ANESTHESIA POSTPROCEDURE EVALUATION
Patient: Gurpreet Gu  UPPER ENDOSCOPY  Anesthesia type: general    Patient location: PACU  Last vitals:   Vitals:    07/18/18 1743   BP: 134/75   Pulse: 83   Resp: 16   Temp:    SpO2: 94%     Post vital signs: stable  Level of consciousness: awake and responds to simple questions  Post-anesthesia pain: pain controlled  Post-anesthesia nausea and vomiting: no  Pulmonary: unassisted, return to baseline  Cardiovascular: stable and blood pressure at baseline  Hydration: adequate  Anesthetic events: no    QCDR Measures:  ASA# 11 - [place]ora-op Cardiac Arrest: ASA11B - Patient did NOT experience unanticipated cardiac arrest  ASA# 12 - [place]-op Mortality Rate: ASA12B - Patient did NOT die  ASA# 13 - PACU Re-Intubation Rate: ASA13B - Patient did NOT require a new airway mgmt  ASA# 10 - Composite Anes Safety: ASA10A - No serious adverse event    Additional Notes:

## 2021-06-19 NOTE — PROGRESS NOTES
Pt amb into clinic for 5-fu via CADD pump dc. Tegaderm almost completely removed; pt denies showering with dressing on or sweating profusely. Old bloody drainage present on gauze at port insertion site. Neulasta Inj Kit applied, reviewed with pt. Pt amb out of clinic.

## 2021-06-19 NOTE — PROGRESS NOTES
Pt presented from clinic provider visit for his first chemo. Explained all meds to pt before giving and explained pump and trouble shooting with him. Phone numbers for questions provided. Pt given mg in addition to chemo today and was instructed by dr levine in starting oral mg. Pt chemo dose was modified due to labs. Pt port worked well. Pt tolerated chemo well. Pt also instructed on drsg change to drain on his abdomen and drsg changed for him today.  Pt discharged home ambulatory. Marjorie Mcginnis RN

## 2021-06-19 NOTE — ANESTHESIA CARE TRANSFER NOTE
Last vitals:   Vitals:    07/20/18 1246   BP: 159/82   Pulse: 78   Resp: 16   Temp: 36.9  C (98.4  F)   SpO2: 96%     Patient's level of consciousness is awake and drowsy  Spontaneous respirations: yes  Maintains airway independently: yes  Dentition unchanged: yes  Oropharynx: oropharynx clear of all foreign objects    QCDR Measures:  ASA# 20 - Surgical Safety Checklist: WHO surgical safety checklist completed prior to induction  PQRS# 430 - Adult PONV Prevention: 4558F - Pt received => 2 anti-emetic agents (different classes) preop & intraop  ASA# 8 - Peds PONV Prevention: NA - Not pediatric patient, not GA or 2 or more risk factors NOT present  PQRS# 424 - Lenora-op Temp Management: 4559F - At least one body temp DOCUMENTED => 35.5C or 95.9F within required timeframe  PQRS# 426 - PACU Transfer Protocol:NA - Patient did not go to PACU  ASA# 14 - Acute Post-op Pain: ASA14B - Patient did NOT experience pain >= 7 out of 10

## 2021-06-19 NOTE — PROGRESS NOTES
Patient here today for labs, OV with Dr. Quick, and [id number] for Pancreatic CA/J. Bang, RN

## 2021-06-19 NOTE — PROGRESS NOTES
Pt was hooked up to 5fu infusion pump. Blood glucose level drawn as ordered. Insulin given as ordered. Pt left infusion clinic via ambulatory and will RTC 8/10 at 2pm for pump DC.

## 2021-06-20 NOTE — PROGRESS NOTES
[facility] Hematology and Oncology Progress Note    Patient: Gurpreet Gu  MRN: [id number]  Date of Service: 8/21/2018        Reason for Visit    Chief Complaint   Patient presents with     HE Cancer     Pancreatic Cancer       Assessment and Plan  Malignant neoplasm of head of pancreas (H)    Staging form: Exocrine Pancreas, AJCC 8th Edition    - Clinical stage from 7/24/2018: Stage IB (cT2, cN0, cM0) - Signed by Xochitl Quick MD on 7/24/2018    ECOG Performance   ECOG Performance Status: 1 (due to fatigue)     Distress Assessment  Distress Assessment Score: No distress    Pain  Currently in Pain: No/denies  Pain Score (Initial OR Reassessment): No/Denies Pain  Location: abdomen    #.  Borderline resectable pancreatic cancer (3.8 x 4.1 cm by CT and 43 x 33 mm pancreatic head mass   He is here for cycle #3 neoadjuvant chemotherapy with FOLFIRINOX.  He feels adequate to continue chemotherapy.  No medication adjustment no dose reduction necessary at this point.  His labs were reviewed and his liver function has significantly improve with normalization of AST, ALT, bilirubin and improvement in alkaline phosphatase.  He has ongoing mild normocytic anemia and mild thrombocytopenia.  I will continue full dose of FOLFIRINOX at this point.    He will receive Neulasta support as well.   Follow-up in 2 weeks for cycle #4 chemotherapy.  Plan for restaging scan after 6 cycles of chemotherapy.    #.  Acute left foot drop    ?  Common peroneal nerve injury.  No significant lumbosacral radiculopathy or paresthesia.  Discussed with Dr. Schilling from neurology.  Recommended EMG study as an outpatient about a couple weeks.  Also suggested to complete MRI lumbar spine to exclude any malignant process.   Will request MRI lumbar spine and neurology referral.    #.  Obstructive jaundice   Not amendable for endoscopic stent placement.  He currently have a biliary drain placed by IR on 7/19/2018.  Still having significant output at this  point although bilirubin is normalized.  He will be referred back to interventional radiology once the drainage is less.     #.  Hypomagnesemia.   Continue oral magnesium oxide 400 mg twice daily.    #.  Diabetes type 2.   He is managing with oral hypoglycemic agent as well as insulin at this point.  He is doing okay.  Problem List    1. Malignant neoplasm of head of pancreas (H)  HM1(CBC and Differential)    Magnesium    Comprehensive Metabolic Panel    HM1 (CBC with Diff)    [facility] RED    Manual Differential    NURSE VISIT    CC OFFICE VISIT LONG    Infusion Appointment    DISCONTINUED: sodium chloride 0.9% 250 mL infusion    DISCONTINUED: palonosetron injection 0.25 mg (ALOXI)    DISCONTINUED: fosaprepitant 150 mg, dexamethasone 12 mg in sodium chloride 0.9% 150 mL    DISCONTINUED: atropine injection 0.5 mg    DISCONTINUED: oxaliplatin 180 mg in dextrose 5% 250 mL chemo (ELOXATIN)    DISCONTINUED: dextrose 5% 500 mL infusion    DISCONTINUED: irinotecan 380 mg in dextrose 5% 500 mL chemo (CAMPTOSAR)    DISCONTINUED: leucovorin 800 mg in dextrose 5% 250 mL (WELLCOVORIN)    DISCONTINUED: fluorouracil injection 800 mg (ADRUCIL)    DISCONTINUED: fluorouracil 2,400 mg/m2 = 5,000 mg in sodium chloride 0.9% 242 mL (ADRUCIL)    DISCONTINUED: sodium chloride flush 20 mL (NS)    DISCONTINUED: heparin 100 unit/mL lockflush (PF) porcine 300-600 Units    DISCONTINUED: pegfilgrastim injection 6 mg (NEULASTA DELIVERY KIT)    DISCONTINUED: sodium chloride flush 20 mL (NS)    DISCONTINUED: heparin 100 unit/mL lockflush (PF) porcine 300-600 Units   2. Encounter for antineoplastic chemotherapy  HM1(CBC and Differential)    Magnesium    Comprehensive Metabolic Panel    HM1 (CBC with Diff)    Manual Differential    NURSE VISIT    CC OFFICE VISIT LONG    Infusion Appointment    DISCONTINUED: sodium chloride 0.9% 250 mL infusion    DISCONTINUED: palonosetron injection 0.25 mg (ALOXI)    DISCONTINUED: fosaprepitant 150 mg, dexamethasone 12  mg in sodium chloride 0.9% 150 mL    DISCONTINUED: atropine injection 0.5 mg    DISCONTINUED: oxaliplatin 180 mg in dextrose 5% 250 mL chemo (ELOXATIN)    DISCONTINUED: dextrose 5% 500 mL infusion    DISCONTINUED: irinotecan 380 mg in dextrose 5% 500 mL chemo (CAMPTOSAR)    DISCONTINUED: leucovorin 800 mg in dextrose 5% 250 mL (WELLCOVORIN)    DISCONTINUED: fluorouracil injection 800 mg (ADRUCIL)    DISCONTINUED: fluorouracil 2,400 mg/m2 = 5,000 mg in sodium chloride 0.9% 242 mL (ADRUCIL)    DISCONTINUED: sodium chloride flush 20 mL (NS)    DISCONTINUED: heparin 100 unit/mL lockflush (PF) porcine 300-600 Units    DISCONTINUED: pegfilgrastim injection 6 mg (NEULASTA DELIVERY KIT)    DISCONTINUED: sodium chloride flush 20 mL (NS)    DISCONTINUED: heparin 100 unit/mL lockflush (PF) porcine 300-600 Units   3. CA head of pancreas (H)  HM1(CBC and Differential)    Magnesium    Comprehensive Metabolic Panel    HM1 (CBC with Diff)    Manual Differential    NURSE VISIT    CC OFFICE VISIT LONG    Infusion Appointment    DISCONTINUED: sodium chloride 0.9% 250 mL infusion    DISCONTINUED: palonosetron injection 0.25 mg (ALOXI)    DISCONTINUED: fosaprepitant 150 mg, dexamethasone 12 mg in sodium chloride 0.9% 150 mL    DISCONTINUED: atropine injection 0.5 mg    DISCONTINUED: oxaliplatin 180 mg in dextrose 5% 250 mL chemo (ELOXATIN)    DISCONTINUED: dextrose 5% 500 mL infusion    DISCONTINUED: irinotecan 380 mg in dextrose 5% 500 mL chemo (CAMPTOSAR)    DISCONTINUED: leucovorin 800 mg in dextrose 5% 250 mL (WELLCOVORIN)    DISCONTINUED: fluorouracil injection 800 mg (ADRUCIL)    DISCONTINUED: fluorouracil 2,400 mg/m2 = 5,000 mg in sodium chloride 0.9% 242 mL (ADRUCIL)    DISCONTINUED: sodium chloride flush 20 mL (NS)    DISCONTINUED: heparin 100 unit/mL lockflush (PF) porcine 300-600 Units    DISCONTINUED: pegfilgrastim injection 6 mg (NEULASTA DELIVERY KIT)    DISCONTINUED: sodium chloride flush 20 mL (NS)    DISCONTINUED:  heparin 100 unit/mL lockflush (PF) porcine 300-600 Units   4. Foot drop, left  MR Lumbar Spine With Without Contrast    Ambulatory referral to Neurology      ______________________________________________________________________________  Diagnosis  May 2018- new ill-defined hypodensity in the pancreatic head mass measuring about 3.5 cm by CT scan upon evaluation of left lower quadrant pain.  6/7/2018-FNA of the pancreatic head mass was positive for adenocarcinoma.  6/22/2018: PET/CT scan show ill-defined mass along the upper anterior margin of the pancreas adjacent to the duodenum with moderate FDG avidity, SUV max 4.7.  No additional sites of suspicious FDG avidity.    Treatment to date  7/24/2018 -neoadjuvant chemotherapy with FOLFIRINOX.  Dose reduced in first cycle due to hyperbilirubinemia :5-FU to 50% (1400 mg/m  from 2800 mg/m ), irinotecan dose to 135 mg/m  from 180 mg/m .  Increase to full dose starting second cycle of chemotherapy.    History of Present Illness    Gurpreet presents today for 3rd cycle of chemotherapy.  He has some fatigue but he is able to continue to work.  His friend noted that he has been drinking on the left side in the last 1-1/2 weeks.  No back pain.  His appetite has somewhat improved.  He has diarrhea about 7 times per day and he stopped taking Imodium as it did not help him.  The biliary drain is still draining about 1200 ml/day.    Pain Status  Currently in Pain: No/denies    Review of Systems    Constitutional  Constitutional (WDL): Exceptions to WDL  Fatigue: Fatigue not relieved by rest - Limiting instrumental ADL  Fever: None  Chills: None  Weight Gain: None  Weight Loss: None  Neurosensory  Neurosensory (WDL): Exceptions to WDL  Peripheral Motor Neuropathy: Asymptomatic, clinical or diagnostic observations only, intervention not indicated (left foot, unable to flex, concerned about foot drop)  Ataxia: Asymptomatic, clinical or diagnostic observations only, intervention not  indicated  Peripheral Sensory Neuropathy: Asymptomatic, loss of deep tendon reflexes or paresthesia (bilat hands and feet, also cold sensitivity)  Confusion: None  Syncope: None  Eye   Eye Disorder (WDL): All eye disorder elements are within defined limits  Ear  Ear Disorder (WDL): All ear disorder elements are within defined limits  Cardiovascular  Cardiovascular (WDL): All cardiovascular elements are within defined limits  Pulmonary  Respiratory (WDL): Exceptions to WDL  Cough: None  Dyspnea: Shortness of breath with moderate exertion (going up stairs)  Hypoxia: None  Gastrointestinal  Gastrointestinal (WDL): Exceptions to WDL  Anorexia: None  Constipation: None  Diarrhea: Increase of 4 - 6 stools per day over baseline, moderate increase in ostomy output compared to baseline (stopped taking imodium, states doesn't help)  Dysphagia: None  Esophagitis: None (omeprazole controls)  Nausea: Loss of appetite without alteration in eating habits  Pharyngitis: None  Vomiting: None  Dysgeusia: None  Dry Mouth: None  Genitourinary  Genitourinary (WDL): All genitourinary elements are within defined limits  Lymphatic  Lymph (WDL): All lymph disorder elements are within defined limits  Musculoskeletal and Connective Tissue  Musculoskeletal and Connetive Tissue Disorders (WDL): Exceptions to WDL  Arthralgia: Mild pain  Bone Pain: Mild pain  Muscle Weakness : Symptomatic, perceived by patient but not evident on physical exam  Myalgia: Mild pain  Integumentary  Integumentary (WDL): All integumentary elements are within defined limits  Patient Coping  Patient Coping: Accepting;Open/discussion  Distress Assessment  Distress Assessment Score: No distress  Accompanied by  Accompanied by: Alone  Oral Chemo Adherence       Past History  Past Medical History:   Diagnosis Date     Diabetes mellitus (H)      Hypercholesteremia      Hypertension      Sleep apnea        Physical Exam    Recent Vitals 8/24/2018   Height -   Weight -   BSA  (m2) -   /64   Pulse 94   Temp 97.7   Temp src 1   SpO2 98   Some recent data might be hidden     General: alert, awake, not in acute distress  HEENT: Head: Normal, normocephalic, atraumatic.  Eye: Normal external eye, DASHAWN.  Yellowish conjunctiva.  Ears: Non-tender.  Nose: Normal external nose, mucus membranes and septum.  Pharynx:  Normal pharynx.  Neck / Thyroid: Supple, no masses, nodes, nodules or enlargement.  Lymphatics: No abnormally enlarged lymph nodes.  Chest: Normal chest wall and respirations. Clear to auscultation.  Heart: S1 S2 RRR, no murmur.   Abdomen: abdomen is soft without significant tenderness, masses, organomegaly or guarding.  Biliary drain present.  Extremities: normal strength, tone, and muscle mass.  Poor dorsiflexion of the left foot.  Skin: normal. no rash or abnormalities  CNS: non focal.    Lab Results    No results found for this or any previous visit (from the past 168 hour(s)).    Imaging    No results found.    TT: 40 minutes and more than 50% was spent on coordination and counseling of care.    Signed by: Xochitl Quick MD

## 2021-06-20 NOTE — PROGRESS NOTES
Gurpreet is here today for ongoing management and tx of pancreatic cancer. Today is planned D1C4 FOLFIRINOX pending labs and OV with Ken Eid NP. Fatimah Mike

## 2021-06-20 NOTE — PROGRESS NOTES
Pt had original drain dominguez in place from [facility] for his biliary tube which was taped many times. New stat lock catherter stabilization device 10-12 Greenlandic obtained from  CT and applied to pt and gauze around biliary tube changed. Orders sent to [email or web address] for pt biliary bags and stabilization devices. Fax to . Marjorie Mcginnis RN

## 2021-06-20 NOTE — PROGRESS NOTES
Patient ambulated into Infusion Care by himself. PPE donned and CADD pump removed. PORT flushed with Normal Saline and 6 cc Heparin and deaccessed. Patient received Neulasta self injector which was applied to his lower left abdominal area. All questions answered and patient was discharged home.

## 2021-06-20 NOTE — PROGRESS NOTES
Patient here today for labs, OV with Dr. Quick, and chemo ([id number]) for pancreatic CA/SERENITY Bang RN

## 2021-06-20 NOTE — PROGRESS NOTES
[facility] Hematology and Oncology Progress Note    Patient: Gurpreet Gu  MRN: [id number]  Date of Service: 9/4/2018         Reason for Visit:    D1C4 neoadjuvant FOLFIRINOX chemotherapy.    Assessment and Plan:    1) Malignant neoplasm of head of pancreas (H)     Stage IB (cT2, cN0, cM0)     63 y.o.     2018, April - evaluation of left lower quadrant pain:    05/03/18 CT A&P - new ill-defined hypodensity in the pancreatic head, mass measuring about 3.5 cm.     06/07/2018 FNA of the pancreatic head mass - positive for adenocarcinoma.    06/22/2018 PET/CT - ill-defined mass along the upper anterior margin of the pancreas adjacent to the duodenum with moderate FDG avidity, SUV max 4.7.  No additional sites of suspicious FDG avidity.    Borderline resectable pancreatic cancer (3.8 x 4.1 cm by CT and 43 x 33 mm pancreatic head mass.    07/19/18 biliary drain placed by IR.    07/24/18 D1C1 FOLFIRINOX (dose reduced 5-FU and irinotecan due to hyperbilirubinemia) + Neulasta.    09/04/18:    CBC - WBC, ANC and Plts WNL.  HgB very stable @ 12.7.    CMP - Alk Phos stable @ 176.  AST, ALT and Bili WNL.  Hyperglycemia.    Significantly improved hyperbilirubinemia from obstructive jaundice. Liver functions all nearly normalized.    Magnesium - WNL.    Continue oral magnesium oxide 400 mg once daily.    The patient looks and feels quite good today.  Fatigue remains his biggest complaint.  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  He notes minimal, mild nausea managed with rare antiemetics.  He's had no emesis.  He notes quite significant cold hypersensitivity related to the oxaliplatin for ~ 8 days.  Acute onset (L) foot drop is improving with time.  He no longer notes mid-abdominal pain radiating to his back yet continues on 2 mg dilaudid about twice daily as well as OxyContin 10 mg once daily.  He states he has an appetite and believes he is eating good, but has lost ~ 20 pounds since starting C1.  Mild loose stools  continue post-op.       Proceed with D1C4 neoadjuvant FOLFIRINOX chemotherapy + Neulasta.    Standard dosing irinotecan and 5-FU from C2 forward.    Patient instructed to contact us if temp > 100.4F as antibiotics and even hospitalization may be indicated.    Intermittent dizziness.    /68 - previously 140-160/80's.  HR stable 80-90.    Decrease antihypertensives    Lisinopril from 10 to 5 mg daily    Norvasc 5 to 2.5 mg daily.    Follow up with PCP    Diarrhea.    Instructed on Imodium use, 2 tabs after first episode diarrhea each day followed by 1 tab after each subsequent episode diarrhea each day.  Max 8 tabs daily.      If ineffective will try Lomotil.    09/18/18 - follow up D1C5 neoadjuvant FOLFIRINOX chemotherapy + Neulasta.    Treatment goal currently is curative with 6 cycles neoadjuvant FOLFIRINOX chemotherapy followed by reassessment for surgical resectability.  Plan for restaging scan after 6 cycles of chemotherapy.      2) Medical cannabis:    Patient requested registry for pain - completed.      Has not returned their application but intends to do so.    3) Acute left foot drop:    Improving with time.  No lumbosacral radiculopathy or paresthesia.      ? related to/exacerbated by not well controlled diabetes + oxaliplatin-related neuropathy.    08/30/18 MR L-spine - Unremarkable appearance of the cord and cauda equina nerve roots. Mild degenerative changes as above, without sites of high-grade spinal or neural foraminal stenosis. No evidence of bony metastatic disease.    Neurology eval tomorrow.    4) Obstructive jaundice:    Not amendable for endoscopic stent placement.      07/19/2018 - Biliary drain placed by IR.      Still having significant output at this point although bilirubin is normalized.      He will be referred back to interventional radiology once the drainage is less - still a lot of drainage (2 full bags/day).  Previously draining ~ 300 ml 5-6 times/day.      5) Diabetes type  2:    BS today @ 167.      On Glucophage + Jardiance + Amaryl.    PCP instructed on sliding scale insulin while receiving chemotherapy.    6) Pain:    No longer note any mid-abdominal pain radiating to his back.    Decreasing dilaudid, now @ 2 tabs daily (previously 3-4/day) and OxyContin only using 1 12-hour daily (previously twice daily).     He will now stop the long acting OxyContin.    ECOG Performance:     ECOG Performance Status: 1     Distress Assessment:    Distress Assessment Score: No distress    Pain:    Currently in Pain: Yes  Pain Score (Initial OR Reassessment): 0  Location: abdomen, radiates to back        TT > 25 minutes face to face with > 50% counseling and care coordination.    Ken Eid, CNP   ______________________________________________    Interim History:    The patient looks and feels quite good today.  Fatigue remains his biggest complaint.  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  He notes rare, mild nausea managed with rare antiemetics.  He's had no emesis.  He notes quite significant cold hypersensitivity lasting ~ 8 days.  No other neuropathies or paresthesias.   His acute onset (L) foot drop is improving with time.  He no longer notes mid-abdominal pain radiating to his back yet continues on 2 mg dilaudid about twice daily as well as OxyContin 10 mg once daily.  He states he has an appetite and believes he is eating good, but has lost ~ 20 pounds since starting C1.  He continues to note loose stools post-op.  He denies cough, fever, chills, unusual headaches, visual or mentation disturbance, bladder issues.    Review of Systems:    Constitutional  Constitutional (WDL): Exceptions to WDL  Fatigue: Fatigue relieved by rest  Weight Loss: to <10% from baseline, intervention not indicated (9 in 2 weeks)  Neurosensory  Neurosensory (WDL): Exceptions to WDL  Peripheral Motor Neuropathy: Asymptomatic, clinical or diagnostic observations only, intervention not indicated  "(left drop foot)  Ataxia: Asymptomatic, clinical or diagnostic observations only, intervention not indicated  Peripheral Sensory Neuropathy: Asymptomatic, loss of deep tendon reflexes or paresthesia (cold neuropathy lasting 11-12 days)  Eye   Eye Disorder (WDL): All eye disorder elements are within defined limits  Ear  Ear Disorder (WDL): All ear disorder elements are within defined limits  Cardiovascular  Cardiovascular (WDL): Exceptions to WDL (tachy)  Pulmonary  Respiratory (WDL): Exceptions to WDL  Dyspnea: Shortness of breath with moderate exertion  Gastrointestinal  Gastrointestinal (WDL): Exceptions to WDL  Diarrhea: Increase of 4 - 6 stools per day over baseline, moderate increase in ostomy output compared to baseline (10-12 times per day)  Genitourinary  Genitourinary (WDL): All genitourinary elements are within defined limits  Lymphatic  Lymph (WDL): All lymph disorder elements are within defined limits  Musculoskeletal and Connective Tissue  Musculoskeletal and Connetive Tissue Disorders (WDL): All Musculoskeletal and Connetive Tissue Disorder elements are within defined limits  Integumentary  Integumentary (WDL): Exceptions to WDL (right shoulder \"itchy patch\")  Patient Coping  Patient Coping: Accepting  Distress Assessment  Distress Assessment Score: No distress  Accompanied by  Accompanied by: Alone  Oral Chemo Adherence       Past Histories:    Past Medical History:   Diagnosis Date     Diabetes mellitus (H)      Hypercholesteremia      Hypertension      Sleep apnea        Physical Exam:    Recent Vitals 9/4/2018   Height 5' 10\"   Weight 163 lbs 3 oz   BSA (m2) 1.91 m2   /68   Pulse 114   Temp 98.6   Temp src 1   SpO2 98   Some recent data might be hidden     General:  Alert.  Pleasant.  No acute distress.  Talkative.  HEENT:  Anicteric sclera.  DASHAWN.  EOMI.  No mucosal lesions.  Lymphatics:  No abnormally enlarged lymph nodes.  Chest:   Lungs clear to auscultation.  Heart:   S1 S2 heard.  RRR.  " No murmur heard.   Abdomen:  Soft without significant tenderness, masses, organomegaly or guarding.  Biliary drain present and draining - dressing dry.  Extremities:  No edema.  Skin:   No rash observed.  CNS:   Non focal.    Lab Results:    Reviewed with patient.    Recent Results (from the past 24 hour(s))   Magnesium   Result Value Ref Range    Magnesium 1.9 1.8 - 2.6 mg/dL   Comprehensive Metabolic Panel   Result Value Ref Range    Sodium 137 136 - 145 mmol/L    Potassium 4.2 3.5 - 5.0 mmol/L    Chloride 104 98 - 107 mmol/L    CO2 25 22 - 31 mmol/L    Anion Gap, Calculation 8 5 - 18 mmol/L    Glucose 167 (H) 70 - 125 mg/dL    BUN 10 8 - 22 mg/dL    Creatinine 0.76 0.70 - 1.30 mg/dL    GFR MDRD Af Amer >60 >60 mL/min/1.73m2    GFR MDRD Non Af Amer >60 >60 mL/min/1.73m2    Bilirubin, Total 0.5 0.0 - 1.0 mg/dL    Calcium 9.4 8.5 - 10.5 mg/dL    Protein, Total 6.2 6.0 - 8.0 g/dL    Albumin 3.6 3.5 - 5.0 g/dL    Alkaline Phosphatase 176 (H) 45 - 120 U/L    AST 11 0 - 40 U/L    ALT 33 0 - 45 U/L   HM1 (CBC with Diff)   Result Value Ref Range    WBC 9.3 4.0 - 11.0 thou/uL    RBC 3.86 (L) 4.40 - 6.20 mill/uL    Hemoglobin 12.7 (L) 14.0 - 18.0 g/dL    Hematocrit 37.3 (L) 40.0 - 54.0 %    MCV 97 80 - 100 fL    MCH 32.9 27.0 - 34.0 pg    MCHC 34.0 32.0 - 36.0 g/dL    RDW 15.4 (H) 11.0 - 14.5 %    Platelets 169 140 - 440 thou/uL    MPV 9.4 8.5 - 12.5 fL    Neutrophils % 67 50 - 70 %    Lymphocytes % 21 20 - 40 %    Monocytes % 11 (H) 2 - 10 %    Eosinophils % 0 0 - 6 %    Basophils % 0 0 - 2 %    Neutrophils Absolute 6.2 2.0 - 7.7 thou/uL    Lymphocytes Absolute 2.0 0.8 - 4.4 thou/uL    Monocytes Absolute 1.0 (H) 0.0 - 0.9 thou/uL    Eosinophils Absolute 0.0 0.0 - 0.4 thou/uL    Basophils Absolute 0.0 0.0 - 0.2 thou/uL     Imaging:    No new imaging.

## 2021-06-20 NOTE — PROGRESS NOTES
Called [facility] medical on 9/19 at 0900 - updated: intake started today, it will take 1-2 business days to process order as an urgent need, insurance will be authorized, and [facility] will call pt to verify shipping address. Will update Gurpreet on process. Fatimah Mike

## 2021-06-20 NOTE — PROGRESS NOTES
[facility] Hematology and Oncology Progress Note    Patient: Gurpreet Gu  MRN: [id number]  Date of Service: 9/19/2018      Reason for Visit    Chief Complaint   Patient presents with     HE Cancer     Pancreatic Cancer       Assessment and Plan  Malignant neoplasm of head of pancreas (H)    Staging form: Exocrine Pancreas, AJCC 8th Edition    - Clinical stage from 7/24/2018: Stage IB (cT2, cN0, cM0) - Signed by Xochitl Quick MD on 7/24/2018    ECOG Performance   ECOG Performance Status: 1     Distress Assessment  Distress Assessment Score: No distress    Pain  Currently in Pain: No/denies    #.  Borderline resectable pancreatic cancer (3.8 x 4.1 cm by CT) pancreatic head mass   He is here for cycle #5 neoadjuvant chemotherapy with FOLFIRINOX.  He feels adequate to continue chemotherapy.  His labs were reviewed and they are fairly in good range.  Will continue with current dose of chemotherapy with Neulasta support.   Follow-up in 2 weeks for cycle #6 chemotherapy.  Plan for restaging scan after 6 cycles of chemotherapy and referral back to Dr. Magana if no progression and the tumor becomes resectable.    #.  Diarrhea, related to chemotherapy   Diarrhea has not been well controlled with Imodium.  I gave him prescription for Lomotil to use as needed.  He will continue to work on proper hydration and nutrition.    #.  Acute left foot drop    MRI lumbar spine did not show any malignant process.  Seen by neurology and plan for EMG study as an outpatient.    #.  Obstructive jaundice   Not amendable for endoscopic stent placement.  He currently have a biliary drain placed by IR on 7/19/2018.  Still having significant output at this point although bilirubin is normalized.  He will be referred back to interventional radiology once the drainage is less.     #.  Hypomagnesemia.  Normal today.   Continue oral magnesium oxide 400 mg twice daily.    #.  Diabetes type 2.   He is managing with oral hypoglycemic agent as well  as insulin prn at this point.  He is doing okay.    Problem List    1. Encounter for antineoplastic chemotherapy  NURSE VISIT    CC OFFICE VISIT LONG    Infusion Appointment    DISCONTINUED: sodium chloride 0.9% 250 mL infusion    DISCONTINUED: palonosetron injection 0.25 mg (ALOXI)    DISCONTINUED: fosaprepitant 150 mg, dexamethasone 12 mg in sodium chloride 0.9% 150 mL    DISCONTINUED: atropine injection 0.5 mg    DISCONTINUED: dextrose 5% 500 mL infusion    DISCONTINUED: diphenhydrAMINE injection 50 mg (BENADRYL)    DISCONTINUED: famotidine 20 mg/2 mL injection 20 mg (PEPCID)    DISCONTINUED: hydrocortisone sod succ (PF) 100 mg/2 mL injection 100 mg    DISCONTINUED: acetaminophen tablet 1,000 mg (TYLENOL)   2. Malignant neoplasm of head of pancreas (H)  NURSE VISIT    CC OFFICE VISIT LONG    Infusion Appointment    DISCONTINUED: sodium chloride 0.9% 250 mL infusion    DISCONTINUED: palonosetron injection 0.25 mg (ALOXI)    DISCONTINUED: fosaprepitant 150 mg, dexamethasone 12 mg in sodium chloride 0.9% 150 mL    DISCONTINUED: atropine injection 0.5 mg    DISCONTINUED: dextrose 5% 500 mL infusion    DISCONTINUED: diphenhydrAMINE injection 50 mg (BENADRYL)    DISCONTINUED: famotidine 20 mg/2 mL injection 20 mg (PEPCID)    DISCONTINUED: hydrocortisone sod succ (PF) 100 mg/2 mL injection 100 mg    DISCONTINUED: acetaminophen tablet 1,000 mg (TYLENOL)   3. CA head of pancreas (H)  NURSE VISIT    CC OFFICE VISIT LONG    Infusion Appointment    DISCONTINUED: sodium chloride 0.9% 250 mL infusion    DISCONTINUED: palonosetron injection 0.25 mg (ALOXI)    DISCONTINUED: fosaprepitant 150 mg, dexamethasone 12 mg in sodium chloride 0.9% 150 mL    DISCONTINUED: atropine injection 0.5 mg    DISCONTINUED: dextrose 5% 500 mL infusion    DISCONTINUED: diphenhydrAMINE injection 50 mg (BENADRYL)    DISCONTINUED: famotidine 20 mg/2 mL injection 20 mg (PEPCID)    DISCONTINUED: hydrocortisone sod succ (PF) 100 mg/2 mL injection 100 mg     DISCONTINUED: acetaminophen tablet 1,000 mg (TYLENOL)      ______________________________________________________________________________  Diagnosis  May 2018- new ill-defined hypodensity in the pancreatic head mass measuring about 3.5 cm by CT scan upon evaluation of left lower quadrant pain.  6/7/2018-FNA of the pancreatic head mass was positive for adenocarcinoma.  6/22/2018: PET/CT scan show ill-defined mass along the upper anterior margin of the pancreas adjacent to the duodenum with moderate FDG avidity, SUV max 4.7.  No additional sites of suspicious FDG avidity.    Treatment to date  7/24/2018 -neoadjuvant chemotherapy with FOLFIRINOX.  Dose reduced in first cycle due to hyperbilirubinemia :5-FU to 50% (1400 mg/m  from 2800 mg/m ), irinotecan dose to 135 mg/m  from 180 mg/m .  Increased to full dose starting second cycle of chemotherapy.    History of Present Illness    Gurpreet presents today for 5th cycle of chemotherapy.    He reported progressive fatigue.  He has cold sensitivity in his fingertips and toes lasted about 6 days after chemotherapy.  He saw her neurology about the left foot drop and felt to be related to sciatic nerve injury.  He is going to proceed with EMG study soon.  He has trouble with diarrhea or about 6-8 times a day even using with Imodium.  His appetite is good.  He reported his weight is now stabilized.   The biliary drain is still draining about 1200 ml/day.    Pain Status  Currently in Pain: No/denies    Review of Systems    Constitutional  Constitutional (WDL): Exceptions to WDL  Fatigue: Fatigue not relieved by rest - Limiting instrumental ADL  Weight Gain: 5 - <10% from baseline (2 lbs)  Neurosensory  Neurosensory (WDL): Exceptions to WDL  Peripheral Motor Neuropathy: Moderate symptoms, limiting instrumental ADL (left drop foot)  Ataxia: Asymptomatic, clinical or diagnostic observations only, intervention not indicated  Peripheral Sensory Neuropathy: Asymptomatic, loss of deep  "tendon reflexes or paresthesia (cold neuropathy lasts for 11 days)  Eye   Eye Disorder (WDL): All eye disorder elements are within defined limits  Ear  Ear Disorder (WDL): All ear disorder elements are within defined limits  Cardiovascular  Cardiovascular (WDL): Exceptions to WDL  Edema: Yes  Edema Limbs: 5 - 10% inter-limb discrepancy in volume or circumference at point of greatest visible difference, swelling or obscuration of anatomic architecture on close inspection (mild LE edema)  Pulmonary  Respiratory (WDL): Within Defined Limits  Gastrointestinal  Gastrointestinal (WDL): Exceptions to WDL  Diarrhea: Increase of <4 stools per day over baseline, mild increase in ostomy output compared to baseline  Dysgeusia: Altered taste with change in diet (e.g., oral supplements), noxious or unpleasant taste, loss of taste  Genitourinary  Genitourinary (WDL): All genitourinary elements are within defined limits  Lymphatic  Lymph (WDL): All lymph disorder elements are within defined limits  Musculoskeletal and Connective Tissue  Musculoskeletal and Connetive Tissue Disorders (WDL): Exceptions to WDL (left drop foot)  Arthralgia: Mild pain  Bone Pain: Mild pain  Muscle Weakness : Symptomatic, perceived by patient but not evident on physical exam  Myalgia: Mild pain  Integumentary  Integumentary (WDL): All integumentary elements are within defined limits  Patient Coping  Patient Coping: Accepting;Open/discussion  Distress Assessment  Distress Assessment Score: No distress  Accompanied by  Accompanied by: Alone  Oral Chemo Adherence       Past History  Past Medical History:   Diagnosis Date     Diabetes mellitus (H)      Hypercholesteremia      Hypertension      Sleep apnea        Physical Exam    Recent Vitals 9/19/2018   Height 5' 10\"   Weight 165 lbs 8 oz   BSA (m2) 1.93 m2   /69   Pulse 102   Temp 97.7   Temp src 1   SpO2 96   Some recent data might be hidden     General: alert, awake, not in acute distress  HEENT: " Head: Normal, normocephalic, atraumatic.  Eye: Normal external eye, DASHAWN.  Yellowish conjunctiva.  Ears: Non-tender.  Nose: Normal external nose, mucus membranes and septum.  Pharynx:  Normal pharynx.  Neck / Thyroid: Supple, no masses, nodes, nodules or enlargement.  Lymphatics: No abnormally enlarged lymph nodes.  Chest: Normal chest wall and respirations. Clear to auscultation.  Heart: S1 S2 RRR, no murmur.   Abdomen: abdomen is soft without significant tenderness, masses, organomegaly or guarding.  Biliary drain present.  Extremities: normal strength, tone, and muscle mass.  Poor dorsiflexion of the left foot.  Skin: normal. no rash or abnormalities  CNS: non focal.    Lab Results    Recent Results (from the past 168 hour(s))   Magnesium   Result Value Ref Range    Magnesium 1.8 1.8 - 2.6 mg/dL   Comprehensive Metabolic Panel   Result Value Ref Range    Sodium 142 136 - 145 mmol/L    Potassium 4.2 3.5 - 5.0 mmol/L    Chloride 107 98 - 107 mmol/L    CO2 22 22 - 31 mmol/L    Anion Gap, Calculation 13 5 - 18 mmol/L    Glucose 162 (H) 70 - 125 mg/dL    BUN 8 8 - 22 mg/dL    Creatinine 0.80 0.70 - 1.30 mg/dL    GFR MDRD Af Amer >60 >60 mL/min/1.73m2    GFR MDRD Non Af Amer >60 >60 mL/min/1.73m2    Bilirubin, Total 0.4 0.0 - 1.0 mg/dL    Calcium 9.3 8.5 - 10.5 mg/dL    Protein, Total 5.8 (L) 6.0 - 8.0 g/dL    Albumin 3.4 (L) 3.5 - 5.0 g/dL    Alkaline Phosphatase 172 (H) 45 - 120 U/L    AST 13 0 - 40 U/L    ALT 25 0 - 45 U/L   HM1 (CBC with Diff)   Result Value Ref Range    WBC 9.2 4.0 - 11.0 thou/uL    RBC 3.86 (L) 4.40 - 6.20 mill/uL    Hemoglobin 12.6 (L) 14.0 - 18.0 g/dL    Hematocrit 38.3 (L) 40.0 - 54.0 %    MCV 99 80 - 100 fL    MCH 32.6 27.0 - 34.0 pg    MCHC 32.9 32.0 - 36.0 g/dL    RDW 15.1 (H) 11.0 - 14.5 %    Platelets 150 140 - 440 thou/uL    MPV 9.8 8.5 - 12.5 fL    Neutrophils % 72 (H) 50 - 70 %    Lymphocytes % 17 (L) 20 - 40 %    Monocytes % 11 (H) 2 - 10 %    Eosinophils % 0 0 - 6 %    Basophils % 0  0 - 2 %    Neutrophils Absolute 6.5 2.0 - 7.7 thou/uL    Lymphocytes Absolute 1.6 0.8 - 4.4 thou/uL    Monocytes Absolute 1.0 (H) 0.0 - 0.9 thou/uL    Eosinophils Absolute 0.0 0.0 - 0.4 thou/uL    Basophils Absolute 0.0 0.0 - 0.2 thou/uL       Imaging    Mr Lumbar Spine With Without Contrast    Result Date: 8/30/2018  [facility] MR LUMBAR SPINE W WO CONTRAST 8/30/2018 8:39 AM INDICATION: Acute left foot drop. pancreatic cancer on chemotherapy TECHNIQUE: Without IV contrast. CONTRAST: 7.5 mL Gadavist was administered. COMPARISON: Abdominal CT 07/16/2018. PET/CT 06/22/2018. FINDINGS: Nomenclature presumes 5 lumbar type vertebral bodies. Vertebral body heights are unremarkable. No destructive osseous lesions are demonstrated. There is no evidence of abnormal bony enhancement or edema. The imaged portions of the bony pelvis and proximal femora appear intact. No pars defects are demonstrated.  Alignment is unremarkable. The conus tip is identified at L2. No signal abnormalities or pathologic enhancement of the imaged cord or cauda equina nerve roots. Grossly normal paraspinal soft tissues. No abdominal aortic aneurysm. T12-L1: Normal disc height and signal. No herniation. No facet arthropathy. No spinal canal stenosis. No right neural foraminal stenosis. No left neural foraminal stenosis. L1-L2: Normal disc height and signal. No herniation. No facet arthropathy. No spinal canal stenosis. No right neural foraminal stenosis. No left neural foraminal stenosis. L2-L3: Normal disc height and signal. No herniation. Ligamentum flavum thickening and mild facet arthropathy. Mild spinal canal stenosis. No right neural foraminal stenosis. No left neural foraminal stenosis. Small right perineural cyst. L3-L4: Preserved intervertebral disc height and signal. Mild broad-based bulging. Mild facet arthropathy. No spinal canal stenosis. Mild right neural foraminal stenosis. Mild left neural foraminal stenosis. L4-L5: Mild  intervertebral disc height loss with loss of T2 signal. Broad-based disc bulging with prominent left foraminal and extraforaminal component. No significant facet arthropathy. Mild spinal canal stenosis. Mild right neural foraminal stenosis. Mild left neural foraminal stenosis. L5-S1: Preserved intervertebral disc height and signal. No significant posterior disc abnormality. No significant facet arthropathy. No spinal canal stenosis. No right neural foraminal stenosis. No left neural foraminal stenosis.     CONCLUSION: 1.  Unremarkable appearance of the cord and cauda equina nerve roots. 2.  Mild degenerative changes as above, without sites of high-grade spinal or neural foraminal stenosis. 3.  No evidence of bony metastatic disease.       TT: 40 minutes and more than 50% was spent on coordination and counseling of care.    Signed by: Xochitl Quick MD

## 2021-06-20 NOTE — PROGRESS NOTES
Gurpreet is here today for ongoing management and tx of pancreatic cancer. Today is planned D1C5 FOLFIRINOX pending labs and OV with Dr. Quick. Fatimah Mike

## 2021-06-20 NOTE — PROGRESS NOTES
Pt amb into clinic for chemo infusion. Reviewed plan of care. Pt premedicated. Pt tolerated FOLFIRINOX treatment well. 5-fu via CADD pump programmed,double-checked; reviewed with pt. Pt amb out of clinic.

## 2021-06-20 NOTE — PROGRESS NOTES
Faxed order for T-tube drainage bag to [facility] with patient's facesheet and printed order per BALDOMERO Mike

## 2021-06-20 NOTE — PROGRESS NOTES
Pt admitted per pedis for his chemo. 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 Pt tolerated all chemo awell and dcd home after treatment.

## 2021-06-20 NOTE — PROGRESS NOTES
"Pt admitted per ron from clinic for his chemo Pt states he is feeling fatigued and weak but is still working and talking about adopting a lost dog and bringing him home on a 3 hour walk after schoool when he was a . States his name was Severo and he had him for 12 years. States his grandson was named Severo and he never had told the story to his daughter about the dog. Also talkative about his dad who always called hime Buster. Pt very touched as he states an intern that he had A\"taken under his wing\" gave him a thank you card when she left that was very heart felt and a 100$ bill. States the same day he was given a card by a co worker that contained 1000 in cash. Pt gets teary eyed when talking about it. Dcd home after infusion  "

## 2021-06-20 NOTE — PROGRESS NOTES
Pt came into infusion clinic for his treatment as ordered. Labs Reviewed. Medications explained to pt who verbalized understanding. Port patent throughout infusion. Pt tolerated infusion with no complications. Pt hooked up to cont 5fu pump. Pt's pump will end at 1230 but pt would like to come in at 1500 so he can finish his work day. Pt has done this before and knows how to shut off his pump. Pt left infusion clinic via ambulatory and will RTC 10/5 at 1500.

## 2021-06-20 NOTE — PROGRESS NOTES
[facility] Hematology and Oncology Progress Note    Patient: Gurpreet Gu  MRN: [id number]  Date of Service: 10/3/2018         Reason for Visit:    D1C6 neoadjuvant FOLFIRINOX chemotherapy.    Assessment and Plan:    1) Malignant neoplasm of head of pancreas (H)     Stage IB (cT2, cN0, cM0)     63 y.o.     2018, April - evaluation of left lower quadrant pain:    05/03/18 CT A&P - new ill-defined hypodensity in the pancreatic head, mass measuring about 3.5 cm.     06/07/2018 FNA of the pancreatic head mass - positive for adenocarcinoma.    06/22/2018 PET/CT - ill-defined mass along the upper anterior margin of the pancreas adjacent to the duodenum with moderate FDG avidity, SUV max 4.7.  No additional sites of suspicious FDG avidity.    Borderline resectable pancreatic cancer (3.8 x 4.1 cm by CT and 43 x 33 mm pancreatic head mass.    07/19/18 biliary drain placed by IR.    07/24/18 D1C1 FOLFIRINOX (dose reduced 5-FU and irinotecan due to hyperbilirubinemia) + Neulasta.    10/03/18:    CBC - mild neutrophilia.  Plts WNL.  HgB very stable @ 12.5.    CMP - Alk Phos rising @ 191.  AST, ALT and Bili WNL.  Hyperglycemia.    Significantly improved hyperbilirubinemia from obstructive jaundice.     Magnesium - WNL.    Continue oral magnesium oxide 400 mg once daily.    The patient presents in good spirits, looking and feeling quite good.  Manageable fatigue remains his biggest complaint.  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  He notes minimal, mild nausea managed with rare antiemetics.  He's had no emesis.  He notes quite significant cold hypersensitivity related to the oxaliplatin for ~ 8 days.  Acute onset (L) foot drop continues to improve with time.  He no longer notes mid-abdominal pain radiating to his back, rarely using 2 mg dilaudid and he has stopped OxyContin entirely.  He states he has an appetite and believes he is eating good, but has lost ~ 20 pounds since starting C1.  Loose stools continue  post-op.       Proceed with D1C6 neoadjuvant FOLFIRINOX chemotherapy + Neulasta.    Standard dosing irinotecan and 5-FU from C2 forward.    Patient instructed to contact us if temp > 100.4F as antibiotics and even hospitalization may be indicated.    Intermittent dizziness -resolved.    /75 - HR stable @ 107.    Decreased antihypertensives    Lisinopril from 10 to 5 mg daily    Norvasc 5 to 2.5 mg daily.    Follow up with PCP    Diarrhea.    Instructed on Lomotil, 2 tabs after first episode diarrhea each day followed by 1 tab after each subsequent episode diarrhea each day.  Max 8 tabs daily.      Begin Creon 3000/9500/66154 with each meal - Rx Eprescribed to his pharmacy.     10/17/18 - follow up D1C7 neoadjuvant FOLFIRINOX chemotherapy + Neulasta with NM PET.    Treatment goal currently is curative with 6 cycles neoadjuvant FOLFIRINOX chemotherapy followed by reassessment for surgical resectability.        2) Medical cannabis:    Patient requested registry for pain - completed.      Has not returned their application.    3) Acute left foot drop:    Improving with time.  No lumbosacral radiculopathy or paresthesia.      ? related to/exacerbated by not well controlled diabetes + oxaliplatin-related neuropathy.  Possibly getting better.    08/30/18 MR L-spine - Unremarkable appearance of the cord and cauda equina nerve roots. Mild degenerative changes as above, without sites of high-grade spinal or neural foraminal stenosis. No evidence of bony metastatic disease.    Has not yet seen Neurology.  Cancelled original appointment due to trip to see daughter.    4) Obstructive jaundice:    Not amendable for endoscopic stent placement.      07/19/2018 - Biliary drain placed by IR.      Still having significant output at this point although bilirubin is normalized.      He will be referred back to interventional radiology once the drainage is less - still a lot of drainage (2 full bags/day).  Previously draining ~ 300  ml 5-6 times/day.      5) Diabetes type 2:    BS today @ 167.      On Glucophage + Jardiance + Amaryl.    PCP instructed on sliding scale insulin while receiving chemotherapy.  Has not used it yet.    6) Pain:    No longer noted.  Previously noted mid-abdominal pain radiating to his back.    Rare dilaudid, not daily (previously 3-4/day) and he has stopped OxyContin.     ECOG Performance:     ECOG Performance Status: 0     Distress Assessment:    Distress Assessment Score: No distress    Pain:    Currently in Pain: No/denies  Location: abdomen         TT > 25 minutes face to face with > 50% counseling and care coordination.    Ken Eid, CNP   ______________________________________________    Interim History:    The patient looks and feels quite good.  Manageable fatigue remains his biggest complaint.  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  He notes rare, mild nausea managed with rare antiemetics.  He's had no emesis.  He notes quite significant cold hypersensitivity lasting ~ 8 days.  He denies any other neuropathies or paresthesias.   His acute onset (L) foot drop is improving with time.  He no longer notes mid-abdominal pain radiating to his back, rarely using 2 mg dilaudid and he has stopped the OxyContin entirely.  He states he has an appetite and believes he is eating good, but has lost ~ 20 pounds since starting C1.  He continues to note loose stools post-op, about 8/day.  He denies cough, fever, chills, unusual headaches, visual or mentation disturbance, bladder issues.    Review of Systems:    Constitutional  Constitutional (WDL): Exceptions to WDL  Fatigue: Fatigue not relieved by rest - Limiting instrumental ADL  Weight Loss: to <10% from baseline, intervention not indicated  Neurosensory  Neurosensory (WDL): Exceptions to WDL  Peripheral Motor Neuropathy: Moderate symptoms, limiting instrumental ADL (very cold sensitive after chemo for 4 to 5 days)  Ataxia: Asymptomatic, clinical or  diagnostic observations only, intervention not indicated  Peripheral Sensory Neuropathy: Asymptomatic, loss of deep tendon reflexes or paresthesia  Eye   Eye Disorder (WDL): All eye disorder elements are within defined limits  Ear  Ear Disorder (WDL): All ear disorder elements are within defined limits  Cardiovascular  Cardiovascular (WDL): Exceptions to WDL  Edema: Yes  Edema Limbs: 5 - 10% inter-limb discrepancy in volume or circumference at point of greatest visible difference, swelling or obscuration of anatomic architecture on close inspection  Pulmonary  Respiratory (WDL): Exceptions to WDL  Dyspnea: Shortness of breath with moderate exertion  Gastrointestinal  Gastrointestinal (WDL): Exceptions to WDL  Anorexia: Loss of appetite without alteration in eating habits  Diarrhea: Increase of greater than or equal to 7 stools per day over baseline, incontinence, hospitalization indicated, severe increase in ostomy output compared to baseline, limiting self care ADL  Dry Mouth: Symptomatic (e.g., dry or thick saliva) without significant dietary alteration, unstimulated saliva flow >0.2 ml/min  Genitourinary  Genitourinary (WDL): All genitourinary elements are within defined limits  Lymphatic  Lymph (WDL): All lymph disorder elements are within defined limits  Musculoskeletal and Connective Tissue  Musculoskeletal and Connetive Tissue Disorders (WDL): All Musculoskeletal and Connetive Tissue Disorder elements are within defined limits  Arthralgia: Mild pain  Bone Pain: Mild pain  Muscle Weakness : Symptomatic, perceived by patient but not evident on physical exam  Myalgia: Mild pain  Integumentary  Integumentary (WDL): All integumentary elements are within defined limits  Patient Coping  Patient Coping: Accepting  Distress Assessment  Distress Assessment Score: No distress  Accompanied by  Accompanied by: Alone  Oral Chemo Adherence       Past Histories:    Past Medical History:   Diagnosis Date     Diabetes mellitus  "(H)      Hypercholesteremia      Hypertension      Sleep apnea        Physical Exam:    Recent Vitals 10/3/2018   Height 5' 10\"   Weight 161 lbs 10 oz   BSA (m2) 1.9 m2   /75   Pulse 107   Temp 97.9   Temp src 1   SpO2 97   Some recent data might be hidden     General:  Alert.  Pleasant.  No acute distress.  Talkative.  HEENT:  Anicteric sclera.  DASHAWN.  EOMI.  No mucosal lesions.  Lymphatics:  No abnormally enlarged lymph nodes.  Chest:   Lungs clear to auscultation.  Heart:   S1 S2 heard.  RRR.  No murmur heard.   Abdomen:  Soft.  No tenderness, distention, masses, organomegaly or guarding.  Biliary drain present and draining - dressing dry.  Extremities:  No edema.  Skin:   No rash observed.  CNS:   Non focal.    Lab Results:    Reviewed with patient.    Recent Results (from the past 24 hour(s))   Magnesium   Result Value Ref Range    Magnesium 2.0 1.8 - 2.6 mg/dL   Comprehensive Metabolic Panel   Result Value Ref Range    Sodium 139 136 - 145 mmol/L    Potassium 4.3 3.5 - 5.0 mmol/L    Chloride 107 98 - 107 mmol/L    CO2 23 22 - 31 mmol/L    Anion Gap, Calculation 9 5 - 18 mmol/L    Glucose 158 (H) 70 - 125 mg/dL    BUN 12 8 - 22 mg/dL    Creatinine 0.84 0.70 - 1.30 mg/dL    GFR MDRD Af Amer >60 >60 mL/min/1.73m2    GFR MDRD Non Af Amer >60 >60 mL/min/1.73m2    Bilirubin, Total 0.4 0.0 - 1.0 mg/dL    Calcium 9.2 8.5 - 10.5 mg/dL    Protein, Total 6.1 6.0 - 8.0 g/dL    Albumin 3.6 3.5 - 5.0 g/dL    Alkaline Phosphatase 191 (H) 45 - 120 U/L    AST 13 0 - 40 U/L    ALT 21 0 - 45 U/L   HM1 (CBC with Diff)   Result Value Ref Range    WBC 12.3 (H) 4.0 - 11.0 thou/uL    RBC 3.79 (L) 4.40 - 6.20 mill/uL    Hemoglobin 12.5 (L) 14.0 - 18.0 g/dL    Hematocrit 38.4 (L) 40.0 - 54.0 %     (H) 80 - 100 fL    MCH 33.0 27.0 - 34.0 pg    MCHC 32.6 32.0 - 36.0 g/dL    RDW 15.3 (H) 11.0 - 14.5 %    Platelets 141 140 - 440 thou/uL    MPV 10.1 8.5 - 12.5 fL    Neutrophils % 77 (H) 50 - 70 %    Lymphocytes % 13 (L) 20 - 40 " %    Monocytes % 10 2 - 10 %    Eosinophils % 0 0 - 6 %    Basophils % 0 0 - 2 %    Neutrophils Absolute 9.4 (H) 2.0 - 7.7 thou/uL    Lymphocytes Absolute 1.6 0.8 - 4.4 thou/uL    Monocytes Absolute 1.2 (H) 0.0 - 0.9 thou/uL    Eosinophils Absolute 0.0 0.0 - 0.4 thou/uL    Basophils Absolute 0.0 0.0 - 0.2 thou/uL       Imaging:    No new imaging.

## 2021-06-21 NOTE — PROGRESS NOTES
[facility] Hematology and Oncology Progress Note    Patient: Gurpreet Gu  MRN: [id number]  Date of Service: 10/31/2018         Reason for Visit:    D1C8 neoadjuvant FOLFIRINOX chemotherapy.    Assessment and Plan:    1) Malignant neoplasm of head of pancreas (H)     Stage IB (cT2, cN0, cM0)     63 y.o.     2018, April - evaluation of left lower quadrant pain:    05/03/18 CT A&P - new ill-defined hypodensity in the pancreatic head, mass measuring about 3.5 cm.     06/07/2018 FNA of the pancreatic head mass - positive for adenocarcinoma.    06/22/2018 PET/CT - ill-defined mass along the upper anterior margin of the pancreas adjacent to the duodenum with moderate FDG avidity, SUV max 4.7.  No additional sites of suspicious FDG avidity.    Borderline resectable pancreatic cancer (3.8 x 4.1 cm by CT and 43 x 33 mm pancreatic head mass.    07/19/18 biliary drain placed by IR.    07/24/18 D1C1 FOLFIRINOX (dose reduced 5-FU and irinotecan due to hyperbilirubinemia) + Neulasta.    10/31/18:    CBC - WBC and ANC WNL.  Plts a bit low @ 132,000.  HgB up a bit @ 13.0.    CMP - stable Alk Phos @ 157.  AST, ALT and Bili WNL.  Hyperglycemia.    Significantly improved hyperbilirubinemia from obstructive jaundice.     Magnesium - WNL.    Continue oral magnesium oxide 400 mg once daily.    The patient presents in good spirits, looking and feeling quite good.  Manageable fatigue remains his biggest complaint.  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  He notes minimal, mild nausea managed with rare antiemetics.  He's had no emesis.  He notes quite significant cold hypersensitivity related to the oxaliplatin for ~ 8 days.  Acute onset (L) foot drop continues to improve with time.  He no longer notes mid-abdominal pain radiating to his back, rarely using 2 mg dilaudid and he has stopped OxyContin entirely.  He states he has an appetite and believes he is eating good.  His weight is now quite stable after losing 20-25 pounds  since starting C1.  Loose stools continue post-op.       Proceed with D1C8 neoadjuvant FOLFIRINOX chemotherapy + Neulasta.    Standard dosing irinotecan and 5-FU C2-C6.  Dose reduced irinotecan 10% on C7 due to persistent loose/diarrea stools.    Patient instructed to contact us if temp > 100.4F as antibiotics and even hospitalization may be indicated.    Intermittent dizziness -resolved.    /78 - HR 87.    Continue decreased antihypertensives:    Lisinopril from 10 to 5 mg daily    Norvasc from 5 to 2.5 mg daily.    Follow up with PCP    Diarrhea.    Explosive and 12x/day.    Continue dose reduced irinotecan 10%.    Instructed on Lomotil, 2 tabs after first episode diarrhea each day followed by 1 tab after each subsequent episode diarrhea each day.  Max 8 tabs daily.      Continue Creon 3000/9500/13650 with each meal.     11/14/18 - follow up D1C9 neoadjuvant FOLFIRINOX chemotherapy + Neulasta with NM PET or CT CAP (insurance dependent).    Treatment goal currently is curative with neoadjuvant FOLFIRINOX chemotherapy followed by reassessment for surgical resectability.        2) Medical cannabis:    Patient requested registry for pain - completed.      Has not returned their application.    3) Acute left foot drop:    Improving with time.  No lumbosacral radiculopathy or paresthesia.      ? related to/exacerbated by not well controlled diabetes + oxaliplatin-related neuropathy.  Possibly getting better.    08/30/18 MR L-spine - Unremarkable appearance of the cord and cauda equina nerve roots. Mild degenerative changes as above, without sites of high-grade spinal or neural foraminal stenosis. No evidence of bony metastatic disease.    Has not yet seen Neurology.  Cancelled original appointment due to trip to see daughter.    4) Obstructive jaundice:    Not amendable for endoscopic stent placement.      07/19/2018 - Biliary drain placed by IR.      Still having significant output at this point although  bilirubin is normalized.      He will be referred back to interventional radiology once the drainage is less - still a lot of drainage (2-3 full bags/day).  Previously draining ~ 300 ml 5-6 times/day.      5) Diabetes type 2:    BS today @ 164.      On Glucophage + Jardiance + Amaryl.    PCP instructed on sliding scale insulin while receiving chemotherapy.  Has not used it yet.    6) Pain:    No longer noted.  Previously noted mid-abdominal pain radiating to his back.    Rare dilaudid, not daily (previously 3-4/day) and he has stopped OxyContin.     ECOG Performance:     ECOG Performance Status: 1     Distress Assessment:    Distress Assessment Score: 3    Pain:    Currently in Pain: No/denies        TT > 25 minutes face to face with > 50% counseling and care coordination.    Ken Eid, [facility]   ______________________________________________    Interim History:    The patient presents looking and feeling quite good.  Stable fatigue remains his biggest complaint.  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  He notes rare, mild nausea managed with rare antiemetics.  He's had no emesis.  He notes quite significant cold hypersensitivity lasting ~ 8 days after each dose oxaliplatin.  He denies any other neuropathies or paresthesias.   His acute onset (L) foot drop is stable/improving with time.  He no longer notes mid-abdominal pain radiating to his back, rarely using 2 mg dilaudid and he has stopped the OxyContin entirely.  He states he has an appetite and believes he is eating good.  His weight is now quite stable after losing 20-25 pounds since starting C1.  He continues to note frequent loose stools post-op.  He denies cough, fever, chills, unusual headaches, visual or mentation disturbance, bladder issues.    Review of Systems:    Constitutional  Constitutional (WDL): Exceptions to WDL  Fatigue: Fatigue not relieved by rest - Limiting instrumental ADL  Weight Loss: to <10% from baseline, intervention  not indicated  Neurosensory  Neurosensory (WDL): Exceptions to WDL  Ataxia: Asymptomatic, clinical or diagnostic observations only, intervention not indicated  Eye   Eye Disorder (WDL): Assessment not pertinent to visit  Ear  Ear Disorder (WDL): Assessment not pertinent to visit  Cardiovascular  Cardiovascular (WDL): All cardiovascular elements are within defined limits  Pulmonary  Respiratory (WDL): Within Defined Limits  Gastrointestinal  Gastrointestinal (WDL): Exceptions to WDL  Diarrhea: Increase of 4 - 6 stools per day over baseline, moderate increase in ostomy output compared to baseline  Nausea: Loss of appetite without alteration in eating habits  Dysgeusia: Altered taste with change in diet (e.g., oral supplements), noxious or unpleasant taste, loss of taste  Dry Mouth: Symptomatic (e.g., dry or thick saliva) without significant dietary alteration, unstimulated saliva flow >0.2 ml/min  Genitourinary  Genitourinary (WDL): All genitourinary elements are within defined limits  Lymphatic  Lymph (WDL): All lymph disorder elements are within defined limits  Musculoskeletal and Connective Tissue  Musculoskeletal and Connetive Tissue Disorders (WDL): Exceptions to WDL  Arthralgia: Mild pain  Bone Pain: Mild pain  Muscle Weakness : Symptomatic, perceived by patient but not evident on physical exam  Myalgia: Mild pain  Integumentary  Integumentary (WDL): Exceptions to WDL  Alopecia: Hair loss of up to 50% of normal for that individual that is not obvious from a distance but only on close inspection, a different hair style may be required to cover the hair loss but it does not require a wig or hair piece to camouflage  Patient Coping  Patient Coping: Open/discussion  Distress Assessment  Distress Assessment Score: 3  Accompanied by  Accompanied by: Alone  Oral Chemo Adherence       Past Histories:    Past Medical History:   Diagnosis Date     Diabetes mellitus (H)      Hypercholesteremia      Hypertension      Sleep  apnea        Physical Exam:    Recent Vitals 10/31/2018   Height -   Weight -   BSA (m2) -   /76   Pulse 102   Temp 98.1   Temp src 1   SpO2 98   Some recent data might be hidden     General:  Alert.  Pleasant.  No acute distress.    HEENT:  Anicteric sclera.  DASHAWN.  EOMI.  No mucosal lesions.  Lymphatics:  No abnormally enlarged lymph nodes.  Chest:   Lungs clear to auscultation.  Heart:   S1 S2 heard.  RRR.  No murmur heard.   Abdomen:  Soft.  Not tender.  Not distended.  No masses, organomegaly or guarding.  Biliary drain present and draining - dressing dry.  Extremities:  No edema.  Skin:   No rash observed.  CNS:   Non focal.    Lab Results:    Reviewed with patient.    Recent Results (from the past 24 hour(s))   Magnesium   Result Value Ref Range    Magnesium 2.5 1.8 - 2.6 mg/dL   Comprehensive Metabolic Panel   Result Value Ref Range    Sodium 140 136 - 145 mmol/L    Potassium 4.4 3.5 - 5.0 mmol/L    Chloride 109 (H) 98 - 107 mmol/L    CO2 20 (L) 22 - 31 mmol/L    Anion Gap, Calculation 11 5 - 18 mmol/L    Glucose 164 (H) 70 - 125 mg/dL    BUN 16 8 - 22 mg/dL    Creatinine 0.77 0.70 - 1.30 mg/dL    GFR MDRD Af Amer >60 >60 mL/min/1.73m2    GFR MDRD Non Af Amer >60 >60 mL/min/1.73m2    Bilirubin, Total 0.3 0.0 - 1.0 mg/dL    Calcium 9.6 8.5 - 10.5 mg/dL    Protein, Total 6.7 6.0 - 8.0 g/dL    Albumin 3.9 3.5 - 5.0 g/dL    Alkaline Phosphatase 157 (H) 45 - 120 U/L    AST 11 0 - 40 U/L    ALT 21 0 - 45 U/L   HM1 (CBC with Diff)   Result Value Ref Range    WBC 8.8 4.0 - 11.0 thou/uL    RBC 3.79 (L) 4.40 - 6.20 mill/uL    Hemoglobin 13.0 (L) 14.0 - 18.0 g/dL    Hematocrit 39.2 (L) 40.0 - 54.0 %     (H) 80 - 100 fL    MCH 34.3 (H) 27.0 - 34.0 pg    MCHC 33.2 32.0 - 36.0 g/dL    RDW 15.1 (H) 11.0 - 14.5 %    Platelets 132 (L) 140 - 440 thou/uL    MPV 9.4 8.5 - 12.5 fL    Neutrophils % 72 (H) 50 - 70 %    Lymphocytes % 15 (L) 20 - 40 %    Monocytes % 12 (H) 2 - 10 %    Eosinophils % 1 0 - 6 %     Basophils % 1 0 - 2 %    Neutrophils Absolute 6.2 2.0 - 7.7 thou/uL    Lymphocytes Absolute 1.3 0.8 - 4.4 thou/uL    Monocytes Absolute 1.1 (H) 0.0 - 0.9 thou/uL    Eosinophils Absolute 0.0 0.0 - 0.4 thou/uL    Basophils Absolute 0.0 0.0 - 0.2 thou/uL       Imaging:    No new imaging.

## 2021-06-21 NOTE — PROGRESS NOTES
Pt came into infusion clinic for his treatment as ordered. Labs Reviewed. Medications explained to pt who verbalized understanding. Port patent throughout infusion. Pt tolerated infusion with no complications. Pt hooked up to cont 5fu infusion. Verbal and written instructions given to pt. Pt left infusion clinic via ambulatory and will RTC 11/2 at 1530 for pump DC. Pt was instructed to turn pump off at 1300 but will not be able to get here until 1530 due to work.

## 2021-06-21 NOTE — PROGRESS NOTES
Pt arrived in Infusion Care by himself. Patient is alert and oriented and VSS. PPE donned and CADD pump disconnected and PORT flushed with Normal Saline and 6 cc Heparin. Dry 2 x 2 taped over PORT site. Neulasta self injector kit applied to upper left abdominal area. Patient c/o a lot of nausea, but stated he forgot to take his Dexamethasone medication.Patient was given some diet gingerale to drink and some soda crackers to eat. All questions answered and patient was discharged home.

## 2021-06-21 NOTE — PROGRESS NOTES
Pt presented to clinic for labs provider visit and poss chemo for pancreas cancer . Marjorie Mcginnis RN

## 2021-06-21 NOTE — PROGRESS NOTES
Pt presented today for 5 FU pump discontinue and neulasta injection. Pt port flushed with saline and heparin and discontinued . Pt co diarrhea which he always has and taking lomotil up to 6 per day. Pt informed he can take up to 8 per day. Also checked pt biliary tube. Pt has some green bile drainage around biliary tube. drsg changed and pt told to watch for fever, odor, pus as this is signs and symptoms of infection. Marjorie Mcginnis RN

## 2021-06-21 NOTE — PROGRESS NOTES
Patient ambulated into Infusion Care by himself. Patient is alert and oriented and VSS. PPE donned and CADD pump disconnected and PORT flushed with Normal Saline and 6 cc Heparin and disconnected. Dry 2 x 2 gauze taped over PORT site. Neulasta self injector applied to left upper abdominal area. Reviewed Neulasta administration instructions and answered all questions.

## 2021-06-21 NOTE — PROGRESS NOTES
[facility] Hematology and Oncology Progress Note    Patient: Gurpreet Gu  MRN: [id number]  Date of Service: 11/14/2018      Reason for Visit    Chief Complaint   Patient presents with     HE Cancer     Pancreatic Cancer       Assessment and Plan  Cancer Staging  Malignant neoplasm of head of pancreas (H)  Staging form: Exocrine Pancreas, AJCC 8th Edition  - Clinical stage from 7/24/2018: Stage IB (cT2, cN0, cM0) - Signed by Xochitl Quick MD on 7/24/2018      ECOG Performance   ECOG Performance Status: 1     Distress Assessment  Distress Assessment Score: 5(due to results today)    Pain  Currently in Pain: No/denies    #.  Borderline resectable pancreatic cancer (3.8 x 4.1 cm by CT) pancreatic head mass   Labs were reviewed today.  Noted hemoglobin is gradually down to 9.9 g/dL, along with mild thrombocytopenia.  Liver function and renal function was unremarkable.  CT scan of chest abdomen pelvis with oral and IV contrast was reviewed with the patient.  This is his first treatment response assessment and it showed significantly smaller pancreatic head mass, or stable 6 mm hypodense lesion in the right hepatic lobe.  No new areas concerning for progression or metastatic disease.  I explained to him that he is responding to neoadjuvant chemotherapy based on the current finding.    Because of his cytopenia, worsening diarrhea, progressive neuropathy, I will decrease the dose of oxaliplatin to 65 mg/m , irinotecan to 150 mg/m , omit bolus dose 5-FU.  He is feeling okay to proceed with cycle #9 chemotherapy today.   I will send the CT scan report to Dr. Magana's office.  I explained to him that potentially we can complete the planned 12 doses of chemotherapy, but I would like Dr. Magana's opinion on that.  If he is adequate to proceed with chemotherapy, we will need to few weeks interval from chemotherapy and surgery date and he voiced understanding.     #.  Diarrhea, related to chemotherapy   Diarrhea has not been  well controlled with Imodium, Lomotil and pancreatic enzyme use.  Further dose reduction of irinotecan today. Continue supportive care with proper hydration and nutrition.      #.  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    Decrease oxaliplatin dose to 65 mg/m .    #.  Acute left foot drop    MRI lumbar spine did not show any malignant process.  Seen by neurology and plan for EMG study as an outpatient.  He will have to be in touch with neurology as his appointment with was rescheduled due to his visit to his family.    #.  Obstructive jaundice   Not amendable for endoscopic stent placement.  He currently have a biliary drain placed by IR on 7/19/2018.  Still having significant output at this point although bilirubin is normalized.  He will be referred back to interventional radiology once the drainage is less.  Continue wound care.     #.  Hypomagnesemia.  Normal today.   Continue oral magnesium oxide 400 mg twice daily.    #.  Mild hypokalemia, resolved.   Encouraged dietary supplementation.    #.  Diabetes type 2.   He is managing with oral hypoglycemic agent as well as insulin prn at this point.      Problem List    1. Encounter for antineoplastic chemotherapy  CC OFFICE VISIT LONG    NURSE VISIT    CC OFFICE VISIT LONG    Infusion Appointment   2. Malignant neoplasm of head of pancreas (H)  CC OFFICE VISIT LONG    NURSE VISIT    CC OFFICE VISIT LONG    Infusion Appointment   3. CA head of pancreas (H)  CC OFFICE VISIT LONG    NURSE VISIT    CC OFFICE VISIT LONG    Infusion Appointment      ______________________________________________________________________________  Diagnosis  May 2018- new ill-defined hypodensity in the pancreatic head mass measuring about 3.5 cm by CT scan upon evaluation of left lower quadrant pain.  6/7/2018-FNA of the pancreatic head mass was positive for adenocarcinoma.  6/22/2018: PET/CT scan show ill-defined mass along the upper anterior margin of the pancreas adjacent to the duodenum with  moderate FDG avidity, SUV max 4.7.  No additional sites of suspicious FDG avidity.    Treatment to date  7/24/2018 -neoadjuvant chemotherapy with FOLFIRINOX.  Dose reduced in first cycle due to hyperbilirubinemia :5-FU to 50% (1400 mg/m  from 2800 mg/m ), irinotecan dose to 135 mg/m  from 180 mg/m .  Increased to full dose starting cycle #2 till C#6 of chemotherapy.    -Decreased irinotecan dose to 135 mg/m  from 180 mg/m  due to persistent diarrhea (watery about 12 times per day) throughout the cycle, and decreased oxaliplatin to 65 mg/m  due to persistent and worsening neuropathy in hands> feet and intolerable cold sensitivity starting cycle #7.    History of Present Illness    Gurpreet presents today for 9th cycle of chemotherapy.    He has progressive fatigue.  He noted cold sensitivity and tingling numbness pain in the fingertips and toes are progressively worse.  He has left foot drop which is fairly unchanged.  He also noted some biliary leakage around the biliary drain site.  He still have about 1200 mL/day of biliary drainage.  He has diarrhea describing as watery nonbloody diarrhea about 12-15 times a day, barely manageable with Lomotil.      Pain Status  Currently in Pain: No/denies    Review of Systems    Constitutional  Constitutional (WDL): Exceptions to WDL  Fatigue: Fatigue not relieved by rest - Limiting instrumental ADL  Fever: None  Chills: None  Weight Gain: None  Weight Loss: to <10% from baseline, intervention not indicated(4# 10/16/18)  Neurosensory  Peripheral Motor Neuropathy: Asymptomatic, clinical or diagnostic observations only, intervention not indicated(hands and feet)  Ataxia: Asymptomatic, clinical or diagnostic observations only, intervention not indicated(left foot drop)  Peripheral Sensory Neuropathy: Asymptomatic, loss of deep tendon reflexes or paresthesia(worse after chemo)  Confusion: None  Syncope: None  Eye   Eye Disorder (WDL): Exceptions to WDL(wears glasses)  Blurred Vision:  "Intervention not indicated(improved)  Dry Eye: None  Eye Pain: None  Watering Eyes: None  Ear  Ear Disorder (WDL): All ear disorder elements are within defined limits  Cardiovascular  Cardiovascular (WDL): Exceptions to WDL  Palpitations: None  Edema: Yes  Edema Limbs: 5 - 10% inter-limb discrepancy in volume or circumference at point of greatest visible difference, swelling or obscuration of anatomic architecture on close inspection  Phlebitis: None  Superficial thrombophlebitis: None  Pulmonary  Cough: None  Dyspnea: Shortness of breath with moderate exertion  Hypoxia: None  Gastrointestinal  Gastrointestinal (WDL): Exceptions to WDL  Anorexia: None  Constipation: None  Diarrhea: Increase of greater than or equal to 7 stools per day over baseline, incontinence, hospitalization indicated, severe increase in ostomy output compared to baseline, limiting self care ADL(states ~15 loose, \"explosive\" stools per day)  Dysphagia: None  Esophagitis: None  Nausea: Loss of appetite without alteration in eating habits(\"very little\")  Pharyngitis: None  Vomiting: None  Dysgeusia: None  Dry Mouth: Symptomatic (e.g., dry or thick saliva) without significant dietary alteration, unstimulated saliva flow >0.2 ml/min  Genitourinary  Genitourinary (WDL): All genitourinary elements are within defined limits  Lymphatic  Lymph (WDL): All lymph disorder elements are within defined limits  Musculoskeletal and Connective Tissue  Musculoskeletal and Connetive Tissue Disorders (WDL): All Musculoskeletal and Connetive Tissue Disorder elements are within defined limits  Integumentary  Integumentary (WDL): Exceptions to WDL(green bile drainage leaking around tube site, redness noted to skin. Patient reports started ~1 1/2 week)  Alopecia: Hair loss of up to 50% of normal for that individual that is not obvious from a distance but only on close inspection, a different hair style may be required to cover the hair loss but it does not require a wig " or hair piece to camouflage  Rash Maculo-Papular: None  Pruritus: None  Urticaria: None  Palmar-Plantar Erythrodysesthesia Syndrome: None  Flushing: None  Patient Coping  Patient Coping: Accepting  Distress Assessment  Distress Assessment Score: 5(due to results today)  Accompanied by  Accompanied by: Alone  Oral Chemo Adherence       Past History  Past Medical History:   Diagnosis Date     Diabetes mellitus (H)      Hypercholesteremia      Hypertension      Sleep apnea        Physical Exam    Recent Vitals 11/14/2018   Height -   Weight 161 lbs 3 oz   BSA (m2) 1.9 m2   /70   Pulse 100   Temp 98.3   Temp src 1   SpO2 99   Some recent data might be hidden     General: alert, awake, not in acute distress  HEENT: Head: Normal, normocephalic, atraumatic.  Eye: Normal external eye, DASHAWN.  Yellowish conjunctiva.  Ears: Non-tender.  Nose: Normal external nose, mucus membranes and septum.  Pharynx:  Normal pharynx.  Neck / Thyroid: Supple, no masses, nodes, nodules or enlargement.  Lymphatics: No abnormally enlarged lymph nodes.  Chest: Normal chest wall and respirations. Clear to auscultation.  Heart: S1 S2 RRR, no murmur.   Abdomen: abdomen is soft without significant tenderness, masses, organomegaly or guarding.  Biliary drain present with about 150ml of bilious fluid.  Extremities: normal strength, tone, and muscle mass.  Poor dorsiflexion of the left foot.  Skin: normal. no rash or abnormalities  CNS: non focal.    Lab Results    Recent Results (from the past 168 hour(s))   Magnesium   Result Value Ref Range    Magnesium 1.8 1.8 - 2.6 mg/dL   Comprehensive Metabolic Panel   Result Value Ref Range    Sodium 139 136 - 145 mmol/L    Potassium 3.9 3.5 - 5.0 mmol/L    Chloride 106 98 - 107 mmol/L    CO2 25 22 - 31 mmol/L    Anion Gap, Calculation 8 5 - 18 mmol/L    Glucose 122 70 - 125 mg/dL    BUN 11 8 - 22 mg/dL    Creatinine 0.70 0.70 - 1.30 mg/dL    GFR MDRD Af Amer >60 >60 mL/min/1.73m2    GFR MDRD Non Af Amer  >60 >60 mL/min/1.73m2    Bilirubin, Total 0.2 0.0 - 1.0 mg/dL    Calcium 8.8 8.5 - 10.5 mg/dL    Protein, Total 5.6 (L) 6.0 - 8.0 g/dL    Albumin 3.3 (L) 3.5 - 5.0 g/dL    Alkaline Phosphatase 134 (H) 45 - 120 U/L    AST 14 0 - 40 U/L    ALT 18 0 - 45 U/L   HM1 (CBC with Diff)   Result Value Ref Range    WBC 8.8 4.0 - 11.0 thou/uL    RBC 2.89 (L) 4.40 - 6.20 mill/uL    Hemoglobin 9.9 (L) 14.0 - 18.0 g/dL    Hematocrit 29.8 (L) 40.0 - 54.0 %     (H) 80 - 100 fL    MCH 34.3 (H) 27.0 - 34.0 pg    MCHC 33.2 32.0 - 36.0 g/dL    RDW 14.5 11.0 - 14.5 %    Platelets 129 (L) 140 - 440 thou/uL    MPV 8.6 8.5 - 12.5 fL    Neutrophils % 71 (H) 50 - 70 %    Lymphocytes % 13 (L) 20 - 40 %    Monocytes % 15 (H) 2 - 10 %    Eosinophils % 0 0 - 6 %    Basophils % 0 0 - 2 %    Neutrophils Absolute 6.2 2.0 - 7.7 thou/uL    Lymphocytes Absolute 1.2 0.8 - 4.4 thou/uL    Monocytes Absolute 1.3 (H) 0.0 - 0.9 thou/uL    Eosinophils Absolute 0.0 0.0 - 0.4 thou/uL    Basophils Absolute 0.0 0.0 - 0.2 thou/uL       Imaging    Ct Chest Abdomen Pelvis With Oral With Iv Cont    Result Date: 11/8/2018  CT CHEST, ABDOMEN, AND PELVIS 11/8/2018 3:49 PM      INDICATION: Pancreatic cancer, treatment response assessment. TECHNIQUE: CT chest, abdomen, and pelvis. Dose reduction techniques were used. IV CONTRAST: 100 mL Omnipaque 350 COMPARISON: CT abdomen and pelvis 07/16/2018 FINDINGS: CHEST: Curvilinear scarring or atelectasis right middle lobe. Small granuloma left lower lobe. Linear strand of fibrosis or atelectasis left lower lobe posteriorly. The lungs otherwise are clear. No other nodules or masses. No infiltrates or effusions. No enlarged mediastinal or hilar nodes. Port-A-Cath left anterior abdominal wall with the tip in the distal SVC.  ABDOMEN: Pancreatic head mass is significantly smaller, now appearing as only subtle soft tissue fullness. The pancreatic body and tail are atrophic. No pancreatic ductal dilatation. A biliary stent and  percutaneous biliary drain have been placed since the prior study with decompression of the dilated intrahepatic biliary tree. The portal vein is patent. 6 mm hypodense lesion right hepatic lobe on image 113 is unchanged. No new liver lesions. The spleen, adrenal glands, gallbladder, and kidneys are normal. Gastric bypass procedure. No adenopathy. PELVIS: No adenopathy or ascites. Prostatic hypertrophy. No evidence for bowel obstruction or inflammatory changes. MUSCULOSKELETAL: No concerning skeletal lesions. Surgical clips anterior abdominal wall.     CONCLUSION: 1.  Pancreatic head mass has significantly decreased in size. 2.  External biliary drain and internal biliary stent with decompression of the intrahepatic biliary system. 3.  Stable small hypodense right hepatic lobe lesion. No other focal liver lesions. 4.  No adenopathy within the chest, abdomen or pelvis.      TT: 40 minutes and more than 50% was spent on coordination and counseling of care.    Signed by: Xochitl Quick MD

## 2021-06-21 NOTE — PROGRESS NOTES
Pt amb into clinic for chemo infusion. Reviewed plan of care. Discussed hgb with Dr. Quikc; chemo dose already adjusted for today. Pt tolerated infusions very well. Jessi SIMMONS changed Abner ayala on biliary tube, advised pt to call if problems or visit ED if fever or biliary tube site painful, warm, drainage becomes foul smelling; pt states understanding. 5fu via CADD pump programmed, double-checked and reviewed with pt. Pt to return after work on Friday for pump dc.Pt amb out of clinic.

## 2021-06-21 NOTE — PROGRESS NOTES
[facility] Hematology and Oncology Progress Note    Patient: Gurpreet Gu  MRN: [id number]  Date of Service: 10/16/2018      Reason for Visit    Chief Complaint   Patient presents with     HE Cancer     Pancreatic Cancer       Assessment and Plan  Malignant neoplasm of head of pancreas (H)    Staging form: Exocrine Pancreas, AJCC 8th Edition    - Clinical stage from 7/24/2018: Stage IB (cT2, cN0, cM0) - Signed by Xochitl Quick MD on 7/24/2018    ECOG Performance   ECOG Performance Status: 0     Distress Assessment  Distress Assessment Score: 3    Pain  Currently in Pain: No/denies    #.  Borderline resectable pancreatic cancer (3.8 x 4.1 cm by CT) pancreatic head mass   He is here for cycle #7 neoadjuvant chemotherapy with FOLFIRINOX.  He is clinically feeling well, however he has noted progressive and a cumulative side effects from chemotherapy with worsening diarrhea, neuropathy.  We discussed about decreasing the dose of irinotecan and oxaliplatin.  Noted his platelet count is slightly lower than normal range.  We will continue to monitor at this point.  We could consider omitting bolus 5-FU if necessary in the next cycle.  Continue Neulasta support.   He will return tomorrow for chemotherapy as it is scheduled.   Regarding the restaging imaging, he is going to complete some sort of imaging study (whether PET/CT or CT with contrast) in the next few weeks.  I explained to him that we would like to assess the response of how the chemotherapy is doing for the cancer.  He will also be in touch with Dr. Magana.      #.  Diarrhea, related to chemotherapy   Diarrhea has not been well controlled with Imodium, Lomotil and pancreatic enzyme use.  Will decrease  Irinotecan.  Tinea supportive care with proper hydration and nutrition.      #.  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    Decrease oxaliplatin dose to 65 mg/m .    #.  Acute left foot drop    MRI lumbar spine did not show any malignant process.  Seen by neurology  and plan for EMG study as an outpatient.  He will have to be in touch with neurology as his appointment with was rescheduled due to his visit to his family.    #.  Obstructive jaundice   Not amendable for endoscopic stent placement.  He currently have a biliary drain placed by IR on 7/19/2018.  Still having significant output at this point although bilirubin is normalized.  He will be referred back to interventional radiology once the drainage is less.     #.  Hypomagnesemia.  Normal today.   Continue oral magnesium oxide 400 mg twice daily.    #.  Mild hypokalemia   Encouraged dietary supplementation.    #.  Diabetes type 2.   He is managing with oral hypoglycemic agent as well as insulin prn at this point.      Problem List    1. Encounter for antineoplastic chemotherapy  CC OFFICE VISIT LONG   2. Malignant neoplasm of head of pancreas (H)  CC OFFICE VISIT LONG   3. CA head of pancreas (H)  CC OFFICE VISIT LONG      ______________________________________________________________________________  Diagnosis  May 2018- new ill-defined hypodensity in the pancreatic head mass measuring about 3.5 cm by CT scan upon evaluation of left lower quadrant pain.  6/7/2018-FNA of the pancreatic head mass was positive for adenocarcinoma.  6/22/2018: PET/CT scan show ill-defined mass along the upper anterior margin of the pancreas adjacent to the duodenum with moderate FDG avidity, SUV max 4.7.  No additional sites of suspicious FDG avidity.    Treatment to date  7/24/2018 -neoadjuvant chemotherapy with FOLFIRINOX.  Dose reduced in first cycle due to hyperbilirubinemia :5-FU to 50% (1400 mg/m  from 2800 mg/m ), irinotecan dose to 135 mg/m  from 180 mg/m .  Increased to full dose starting cycle #2 till C#6 of chemotherapy.    -Decreased irinotecan dose to 135 mg/m  from 180 mg/m  due to persistent diarrhea (watery about 12 times per day) throughout the cycle, and decreased oxaliplatin to 65 mg/m  due to persistent and worsening  neuropathy in hands> feet and intolerable cold sensitivity starting cycle #7.    History of Present Illness    Gurpreet presents today for 7th cycle of chemotherapy.    He has gained about 4 pounds.  He has progressive fatigue.  He is diarrhea his main concern with describing watery diarrhea  12+ times per day.  Lomotil help to very small extent.  Pancreatic enzyme also help to very small extent.  He has still having at least 1200 ml/day biliary drainage.  He sometimes noticed soreness in his mouth but no open sores.  No pain.    He did not complete the CT scan.  He intended to have a PET/CT scan for treatment response assessment in the past scan was denied.  He is going to appeal his insurance.      Pain Status  Currently in Pain: No/denies    Review of Systems    Constitutional  Constitutional (WDL): Exceptions to WDL  Fatigue: Fatigue not relieved by rest - Limiting instrumental ADL  Fever: None  Chills: None  Weight Gain: 5 - <10% from baseline (4# 10/3/18)  Weight Loss: None  Neurosensory  Neurosensory (WDL): Exceptions to WDL  Peripheral Motor Neuropathy: Asymptomatic, clinical or diagnostic observations only, intervention not indicated  Ataxia: Asymptomatic, clinical or diagnostic observations only, intervention not indicated  Peripheral Sensory Neuropathy: Asymptomatic, loss of deep tendon reflexes or paresthesia (hands and feet, hands worse than feet better today, sensivity to cold-worse after chemo)  Confusion: None  Syncope: None  Eye   Eye Disorder (WDL): Exceptions to WDL  Blurred Vision: Intervention not indicated  Dry Eye: None  Eye Pain: None  Watering Eyes: None  Ear  Ear Disorder (WDL): All ear disorder elements are within defined limits  Cardiovascular  Cardiovascular (WDL): Exceptions to WDL  Palpitations: None  Edema: Yes  Edema Limbs: 5 - 10% inter-limb discrepancy in volume or circumference at point of greatest visible difference, swelling or obscuration of anatomic architecture on close  "inspection (bilat LE)  Phlebitis: None  Superficial thrombophlebitis: None  Pulmonary  Respiratory (WDL): Within Defined Limits  Gastrointestinal  Gastrointestinal (WDL): Exceptions to WDL  Anorexia: None  Constipation: None  Diarrhea: Increase of greater than or equal to 7 stools per day over baseline, incontinence, hospitalization indicated, severe increase in ostomy output compared to baseline, limiting self care ADL  Dysphagia: None  Esophagitis: None  Nausea: None  Pharyngitis: None  Vomiting: None  Dysgeusia: Altered taste with change in diet (e.g., oral supplements), noxious or unpleasant taste, loss of taste  Dry Mouth: None  Genitourinary  Genitourinary (WDL): All genitourinary elements are within defined limits  Lymphatic  Lymph (WDL): All lymph disorder elements are within defined limits  Musculoskeletal and Connective Tissue  Musculoskeletal and Connetive Tissue Disorders (WDL): All Musculoskeletal and Connetive Tissue Disorder elements are within defined limits  Integumentary  Integumentary (WDL): All integumentary elements are within defined limits  Patient Coping  Patient Coping: Accepting  Distress Assessment  Distress Assessment Score: 3  Accompanied by  Accompanied by: Alone  Oral Chemo Adherence       Past History  Past Medical History:   Diagnosis Date     Diabetes mellitus (H)      Hypercholesteremia      Hypertension      Sleep apnea        Physical Exam    Recent Vitals 10/16/2018   Height 5' 10\"   Weight 165 lbs 5 oz   BSA (m2) 1.92 m2   /78   Pulse 87   Temp 97.6   Temp src 1   SpO2 99   Some recent data might be hidden     General: alert, awake, not in acute distress  HEENT: Head: Normal, normocephalic, atraumatic.  Eye: Normal external eye, DASHAWN.  Yellowish conjunctiva.  Ears: Non-tender.  Nose: Normal external nose, mucus membranes and septum.  Pharynx:  Normal pharynx.  Neck / Thyroid: Supple, no masses, nodes, nodules or enlargement.  Lymphatics: No abnormally enlarged lymph " nodes.  Chest: Normal chest wall and respirations. Clear to auscultation.  Heart: S1 S2 RRR, no murmur.   Abdomen: abdomen is soft without significant tenderness, masses, organomegaly or guarding.  Biliary drain present with about 150ml of bilious fluid.  Extremities: normal strength, tone, and muscle mass.  Poor dorsiflexion of the left foot.  Skin: normal. no rash or abnormalities  CNS: non focal.    Lab Results    Recent Results (from the past 168 hour(s))   HM1 (CBC with Diff)   Result Value Ref Range    WBC 10.4 4.0 - 11.0 thou/uL    RBC 3.34 (L) 4.40 - 6.20 mill/uL    Hemoglobin 11.2 (L) 14.0 - 18.0 g/dL    Hematocrit 33.8 (L) 40.0 - 54.0 %     (H) 80 - 100 fL    MCH 33.5 27.0 - 34.0 pg    MCHC 33.1 32.0 - 36.0 g/dL    RDW 15.8 (H) 11.0 - 14.5 %    Platelets 139 (L) 140 - 440 thou/uL    MPV 9.7 8.5 - 12.5 fL    Neutrophils % 73 (H) 50 - 70 %    Lymphocytes % 14 (L) 20 - 40 %    Monocytes % 12 (H) 2 - 10 %    Eosinophils % 0 0 - 6 %    Basophils % 0 0 - 2 %    Neutrophils Absolute 7.5 2.0 - 7.7 thou/uL    Lymphocytes Absolute 1.5 0.8 - 4.4 thou/uL    Monocytes Absolute 1.3 (H) 0.0 - 0.9 thou/uL    Eosinophils Absolute 0.0 0.0 - 0.4 thou/uL    Basophils Absolute 0.0 0.0 - 0.2 thou/uL       Imaging    No results found.    TT: 40 minutes and more than 50% was spent on coordination and counseling of care.    Signed by: Xochitl Quick MD

## 2021-06-22 NOTE — PROGRESS NOTES
Pt amb into clinic for chemo infusion. Reviewed plan of care. Pt premedicated. Pt tolerated infusions very well. 5fu via CADD pump programmed, double-checked. Pt reminded of when and whom to call prn. Pt to return Sat for pump dc.

## 2021-06-22 NOTE — H&P
[facility] Radiology History and Physical Note    Procedure Requested: Cholangiogram   Requesting Provider: Ken Eid CNP    HPI: Gurpreet Gu is a 63 y.o. old male with PMH of surgical side to side gastrojejunostomy for GOO 2/2 duodenal hematoma in 2018. Recent Diagnosis pancreatic cancer in 2018 undergoing neoadjuvant chemotherapy followed by potential surgical intervention with biliary obstruction s/p 10 FR external biliary drain and CBD covered stenting 18 in IR. Presents for cholangiogram for 2 week H/O leaking at biliary drain exit site and left sided abdominal pain. Denies fevers and chills.  Reports good OP from biliary drain despite leaking at exit site.    [id number]  FINDINGS:   Marked dilation of intrahepatic and central bile ducts with complete obstruction of the proximal common bile duct. The distal common bile duct is patent. Common bile duct obstruction treated with covered stents as noted above and postdilated to 7 mm.   Antegrade flow into the duodenum was reestablished. A 10 Sinhala external biliary drain was maintained and open to gravity drainage pending drop in bilirubin.     IMPRESSION:   Malignant obstruction proximal common bile duct with biliary obstruction and dilation. Successful covered stent placement CBD obstruction with reestablished in-line flow to the duodenum. 10 Sinhala external biliary drain was maintained to gravity drainage   pending significant drop in bilirubin. If there is significant downward trending of bilirubin, consider tube check and possible removal.       NPO Status: Mn  Anticoagulation/Antiplatelets/Bleeding tendencies: NONE   Antibiotics: Rocephin for IR procedure     REVIEW OF SYMPTOMS:   As above in HPI otherwise remainder 10 point ROS negative     PAST MEDICAL HISTORY:   Past Medical History:   Diagnosis Date     Diabetes mellitus (H)      Hypercholesteremia      Hypertension      Sleep apnea        PAST SURGICAL HISTORY:  Past  Surgical History:   Procedure Laterality Date     ERCP N/A 7/18/2018    Procedure: UPPER ENDOSCOPY;  Surgeon: Ken Atwood MD;  Location: [facility];  Service:      ESOPHAGOGASTRODUODENOSCOPY N/A 1/13/2018    Procedure: ESOPHAGOGASTRODUODENOSCOPY (EGD) with biopsies;  Surgeon: Chemo Fernandez MD;  Location: [facility] GI;  Service:      EXPLORATORY LAPAROTOMY N/A 1/30/2018    Procedure: LAPAROTOMY;  Surgeon: Edward Brown MD;  Location: [facility] OR;  Service:      GASTROJEJUNOSTOMY N/A 1/30/2018    Procedure: GASTROJEJUNOSTOMY, OPEN;  Surgeon: Edward Brown MD;  Location: [facility] OR;  Service:      PICC  1/13/2018          TUNNELED VENOUS CATHETER PLACEMENT Left 7/20/2018    Procedure: INSERTION, VASCULAR ACCESS PORT;  Surgeon: Amadou Magana MD;  Location: [facility];  Service:      UNDESCENDED TESTICLE EXPLORATION      infant, 1968       ALLERGIES:  Patient has no known allergies.    MEDICATIONS:  Current Outpatient Medications   Medication Sig Dispense Refill     amLODIPine (NORVASC) 5 MG tablet Take 5 mg by mouth daily.       bismuth subsalicylate (PEPTO BISMOL) 262 mg/15 mL suspension Take 15-30 mL by mouth every 6 (six) hours as needed for indigestion.       blood glucose test strips Inject 1 each under the skin 4 (four) times a day.       cetirizine (ZYRTEC) 10 MG tablet Take 10 mg by mouth daily.       cholecalciferol, vitamin D3, 5,000 unit Tab Take 5,000 Units by mouth daily.       dexamethasone (DECADRON) 4 MG tablet TAKE TWO TABLETS BY MOUTH EVERY 12 HOURS FOR 3 DAYS STARTING THE DAY AFTER CHEMOTHERAPY 72 tablet 0     diphenoxylate-atropine (LOMOTIL) 2.5-0.025 mg per tablet 2 tabs with first episode diarrhea each day followed by 1 tab after each subsequent episode.  Max 8/day.. 120 tablet 1     empagliflozin (JARDIANCE) 10 mg Tab Take 10 mg by mouth daily.       FLUoxetine (PROZAC) 20 MG capsule Take 40 mg by mouth daily.        glimepiride (AMARYL) 4 MG  tablet Take 8 mg by mouth daily before breakfast.        HYDROmorphone (DILAUDID) 2 MG tablet Take 1 tablet (2 mg total) by mouth every 4 (four) hours as needed for pain. 60 tablet 0     insulin lispro (HUMALOG KWIKPEN INSULIN) 100 unit/mL pen Inject under the skin. Inject three times daily for sliding scale.       lidocaine-prilocaine (EMLA) cream Apply to port site 1 hour before port needle access. 15 g 1     lipase-protease-amylase (CREON) 3,000-9,500- 15,000 unit CpDR Take 1 tablet by mouth 3 (three) times a day before meals. 90 capsule 1     lisinopril (PRINIVIL,ZESTRIL) 10 MG tablet Take 10 mg by mouth daily.       metFORMIN (GLUCOPHAGE) 1000 MG tablet Take 1,000 mg by mouth 2 (two) times a day with meals.        multivitamin (ONE A DAY) per tablet Take 1 tablet by mouth daily.       multivitamin therapeutic tablet Take 1 tablet by mouth daily.       omeprazole (PRILOSEC) 40 MG capsule Take 40 mg by mouth 2 (two) times a day before meals.       ONETOUCH DELICA LANCETS 30 gauge Misc 1 Dose.  0     ONETOUCH ULTRA BLUE TEST STRIP strips 1 strip 4 (four) times a day.  0     oxyCODONE (OXYCONTIN) 10 mg 12 hr tablet Take 1 tablet (10 mg total) by mouth every 12 (twelve) hours. 60 tablet 0     pravastatin (PRAVACHOL) 80 MG tablet Take 80 mg by mouth at bedtime.       prochlorperazine (COMPAZINE) 10 MG tablet Take 1 tablet (10 mg total) by mouth every 6 (six) hours as needed (For breakthrough nausea/vomiting). 30 tablet 1     sitaGLIPtin (JANUVIA) 100 MG tablet Take 100 mg by mouth daily.       triamcinolone (KENALOG) 0.1 % cream Apply 1 application topically as needed for itching.       No current facility-administered medications for this encounter.      Facility-Administered Medications Ordered in Other Encounters   Medication Dose Route Frequency Provider Last Rate Last Dose     sodium chloride flush 10 mL (NS)  10 mL Intravenous Q8H FIXED TIMES Ken SCARLET Eid CNP         sodium chloride flush 10 mL (NS)  10  "mL Intravenous Line Care Ken NOVAK MICHELLE Eid         sodium chloride flush 20 mL (NS)  20 mL Intravenous PRN Ken Gipsonacker, CNP   20 mL at 11/28/18 1043       LABS:    Hemoglobin (g/dL)   Date Value   11/28/2018 9.2 (L)     Platelets (thou/uL)   Date Value   11/28/2018 270     Creatinine (mg/dL)   Date Value   11/28/2018 0.62 (L)   11-28-18 bilirubin total 0.2    EXAM:  /62   Pulse 89   Temp 98.7  F (37.1  C) (Oral)   Resp 16   Ht 5' 10\" (1.778 m)   Wt 154 lb (69.9 kg)   SpO2 100%   BMI 22.10 kg/m      General: Stable. In no acute distress.  Neuro: A&O x 3. Moves all extremities equally.  Resp: Lungs clear to auscultation bilaterally.  Cardio: S1S2 and reg, without murmur, clicks or rubs  Abdomen: Soft, non-distended, non-tender, positive bowel sounds. Biliary drain-CDI to gravity drainage with light brown bile appearing drainage. No rebound or guarding    Pre-Sedation Assessment:  Mallampati Airway Classification: Class 2: upper half of tonsil fossa visible  Previous reaction to anesthesia/sedation: no  Sedation plan based on assessment: Moderate  Sleep Apnea: yes-CPAP  Dentures: no  COPD: no  ASA Classification: ASA 3 - Patient with moderate systemic disease with functional limitations      ASSESSMENT:   64 yo male with pancreatic cancer undergoing neoadjuvant chemotherapy followed by potential surgical intervention with biliary obstruction s/p 10 FR external biliary drain and CBD covered stenting 7-19-18 in IR. 2 week H/O leaking at biliary drain exit site and left quadrant abdominal pain  Labs, medications, nutritional status, imaging, clinical information and history obtained and reviewed. Patient appropriate to proceed with cholangiogram with biliary drain replacement.     The procedure, risks and moderate sedation were discussed with patient, all questions answered and patient agrees to proceed with the procedure.  Written consent obtained.     15 minutes were spent with patient during " today's visit with greater than >50% of the time spent on counseling and coordinating patient's care.       Leana Ramires CNP  Interventional Radiology  [phone]         E/M codes for reference only:  [address]  [place] [phone]

## 2021-06-22 NOTE — PROGRESS NOTES
Pt presented to clinic for labs, provider visit and poss chemo for pancreas cancer. Pt has bile tube which has been leaking around insertion site and has a crack in it. Provider notified. Pt abdomen distended, co abd pain. Marjorie Mcginnis RN

## 2021-06-22 NOTE — PROCEDURES
Interventional Radiology Post-Procedure Note   [facility]  ?   Brief Procedure Note:   Patient name: Gurpreet Gu  Pt MRN:[id number]   Date of procedure: 11/29/2018     Procedure(s): Cholangiogram, bilioplasty and external biliary drain placement  Sedation method: Moderate sedation was employed. The patient was monitored by an interventional radiology nurse at all times throughout the procedure under my direct guidance.  Pre Procedure Diagnosis: Pancreatic cancer, biliary obstruction  Post Procedure Diagnosis: Same, patent biliary stents  Indications: Biliary obstruction, drainage around existing biliary drain   ?   Attending: Heri Waddell M.D.  Specimen(s) removed: None   Additional studies ordered: None  Drains: 10 Fr External biliary drain  Estimated Blood Loss: Minimal  Complications: None  Vascular closure method: N/A    Findings/Notes/Comments: Cholangiogram demonstrating patent covered biliary stents however moderate narrowing was noted superiorly, likely sludge. Successful bilioplasty with resolution of narrowing. New external drain placed and capped. 1 week capping trial. Should patient have no fevers, chills, pain, jaundice or leakage around catheter, then patient may have tube removed.   ?   Please see dictation in PACS or under the Imaging tab in [facility] for detailed procedure note.     Heri Waddell M.D.   Vascular and Interventional Radiology   Pager: [phone]   After Hours / Scheduling: [phone]     11/29/2018  10:42 AM

## 2021-06-22 NOTE — PROGRESS NOTES
"Pt states he \"feels the same\" as last time he was in.  Very happy about having his biliary drain out for the last couple days  "

## 2021-06-22 NOTE — PROGRESS NOTES
[facility] Hematology and Oncology Progress Note    Patient: Gurpreet Gu  MRN: [id number]  Date of Service: 12/5/2018         Reason for Visit:    One week delayed C10 neoadjuvant FOLFIRINOX chemotherapy.    Assessment and Plan:    1) Malignant neoplasm of head of pancreas (H)     Stage IB (cT2, cN0, cM0)     63 y.o.     2018, April - evaluation of left lower quadrant pain:    05/03/18 CT A&P - new ill-defined hypodensity in the pancreatic head, mass measuring about 3.5 cm.     06/07/2018 FNA of the pancreatic head mass - positive for adenocarcinoma.    06/22/2018 PET/CT - ill-defined mass along the upper anterior margin of the pancreas adjacent to the duodenum with moderate FDG avidity, SUV max 4.7.  No additional sites of suspicious FDG avidity.    Borderline resectable pancreatic cancer (3.8 x 4.1 cm by CT and 43 x 33 mm pancreatic head mass.    07/19/18 biliary drain placed by IR.    07/24/18 D1C1 FOLFIRINOX (dose reduced 5-FU and irinotecan due to hyperbilirubinemia) + Neulasta.    11/28/18 - HELD D1C10 neoadjuvant FOLFIRINOX chemotherapy + Neulasta for IR to replace biliary drain.    12/05/18:    CBC - WBC, ANC and Plts WNL.  HgB stable @ 9.7.    CMP - Alk Phos stable @ 136.  AST, ALT and Bili WNL. Slightly improved hyperalbuminemia.     Continue increased protein through diet.    Magnesium - WNL.    Continue oral magnesium oxide 400 mg once daily.    The patient presents in good spirits, looking quite good but noting return of mild ABD discomfort with biliary drain dressing saturated and malodorous.  With the discomfort he has now resumed 2 mg dilaudid and OxyContin analgesia.  His appetite has dropped a bit and his weight has dropped a few more pounds after losing 20-25 pounds since starting C1. Stable, manageable fatigue.  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  He notes minimal, mild nausea managed with rare antiemetics.  He's had no emesis.  He continues to note quite significant cold  hypersensitivity related to the oxaliplatin for ~ 8 days.  Acute onset (L) foot drop improved with time.  He states he has an appetite and believes he is eating good.  Loose stools continue post-op.  Abdominal pain mostly resolved.  Cold neuropathy lasts about a week, never completely resolves.    Proceed one week delayed C10 neoadjuvant FOLFIRINOX chemotherapy + Neulasta.    Received standard dosing irinotecan and 5-FU C2-C6.    Has received dose reduced irinotecan since C7 due to persistent loose/diarrea stools.    Dose reduced oxaliplatin ~20% on C7, C9 and today, C10.    Patient instructed to contact us if temp > 100.4F as antibiotics and even hospitalization may be indicated.    Intermittent dizziness -resolved.    /73 - HR 96.    Continue decreased antihypertensives:    Lisinopril from 10 to 5 mg daily    Norvasc from 5 to 2.5 mg daily.    Follow up with PCP    Diarrhea:    A bit less.    Continue dose reduced irinotecan 10%.    Continue Lomotil for diarrhea, 2 tabs after first episode diarrhea each day followed by 1 tab after each subsequent episode diarrhea each day.  Max 8 tabs daily.      Continue Creon 3000/9500/54668 with each meal.     12/05/18 - follow up D1C11 neoadjuvant FOLFIRINOX chemotherapy + Neulasta.    Treatment goal currently is curative with neoadjuvant FOLFIRINOX chemotherapy followed by reassessment for surgical resectability.        2) Medical cannabis:    Patient requested registry for pain - completed.      Has not returned their application, but intends to this week.    3) Acute left foot drop:    Improving with time.  No lumbosacral radiculopathy or paresthesia.      ? related to/exacerbated by not well controlled diabetes + oxaliplatin-related neuropathy.  Possibly getting better.    08/30/18 MR L-spine - Unremarkable appearance of the cord and cauda equina nerve roots. Mild degenerative changes as above, without sites of high-grade spinal or neural foraminal stenosis. No  evidence of bony metastatic disease.    Has not yet seen Neurology.  Cancelled original appointment due to trip to see daughter.    4) Obstructive jaundice:    Not amendable for endoscopic stent placement.      07/19/2018 - Biliary drain placed by IR.      11/29/18 - biliary drain replaced by IR.    On capping trial since 11/29/18.      No fevers, chills, right upper quadrant pain, jaundice, or leakage around the catheter.    12/06/18 - follow up with IR scheduled to consider catheter removal.      5) Diabetes type 2:    BS today @ 84.      On Glucophage + Jardiance + Amaryl.    PCP instructed on sliding scale insulin while receiving chemotherapy.  Has not used it yet.    6) Pain:    Previously noted mid-abdominal pain radiating to his back had resolved but returned this past week with biliary tube leakage.    States pain reduced 80% with replaced biliary tube, now only using 2 dilaudid 2 mg daily and not using OxyContin every 12 hours or even daily.     ECOG Performance:     ECOG Performance Status: 1     Distress Assessment:    Distress Assessment Score: 4    Pain:    Currently in Pain: Yes  Pain Score (Initial OR Reassessment): 4  Location: left abdomen took a pain pill and gone now        TT > 25 minutes face to face with > 50% counseling and care coordination.    Ken Eid, CNP   ______________________________________________    Interim History:    The patient presents looking quite good after biliary tube replaced last week.  His ABD discomfort has mostly resolved and he has decreased 2 mg dilaudid to just 1-2 times daily and no longer using OxyContin daily.  His appetite is better and his weight increased a few pounds after losing ~25 pounds since C1.  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  He notes minimal, mild nausea managed with rare antiemetics.  He's had no emesis.  He continues to note quite significant cold hypersensitivity related to the  oxaliplatin for ~ 8 days.  He denies any other neuropathies or paresthesias.  His acute onset (L) foot drop seems to be slowly improving with time.  He continues with loose stools continue post-op but they have decreased a bit and managed with Lomotil.  He denies cough, fever, chills, unusual headaches, visual or mentation disturbance, bladder issues.     Review of Systems:    Constitutional  Constitutional (WDL): Exceptions to WDL  Fatigue: Fatigue not relieved by rest - Limiting instrumental ADL  Weight Gain: 5 - <10% from baseline  Neurosensory  Neurosensory (WDL): Exceptions to WDL  Peripheral Motor Neuropathy: Asymptomatic, clinical or diagnostic observations only, intervention not indicated  Ataxia: Asymptomatic, clinical or diagnostic observations only, intervention not indicated  Peripheral Sensory Neuropathy: Asymptomatic, loss of deep tendon reflexes or paresthesia  Eye   Eye Disorder (WDL): All eye disorder elements are within defined limits  Ear  Ear Disorder (WDL): Assessment not pertinent to visit  Cardiovascular  Cardiovascular (WDL): Exceptions to WDL  Edema: Yes  Edema Limbs: 5 - 10% inter-limb discrepancy in volume or circumference at point of greatest visible difference, swelling or obscuration of anatomic architecture on close inspection  Pulmonary  Respiratory (WDL): Within Defined Limits  Gastrointestinal  Gastrointestinal (WDL): Exceptions to WDL  Diarrhea: Increase of greater than or equal to 7 stools per day over baseline, incontinence, hospitalization indicated, severe increase in ostomy output compared to baseline, limiting self care ADL(8 stools per day)  Nausea: Loss of appetite without alteration in eating habits  Dry Mouth: Symptomatic (e.g., dry or thick saliva) without significant dietary alteration, unstimulated saliva flow >0.2 ml/min  Genitourinary  Genitourinary (WDL): All genitourinary elements are within defined limits  Lymphatic  Lymph (WDL): All lymph disorder elements are  within defined limits  Musculoskeletal and Connective Tissue  Musculoskeletal and Connetive Tissue Disorders (WDL): Exceptions to WDL  Arthralgia: Mild pain  Bone Pain: Mild pain(big toe but not today left foot)  Muscle Weakness : Symptomatic, perceived by patient but not evident on physical exam  Integumentary  Integumentary (WDL): Exceptions to WDL  Alopecia: Hair loss of up to 50% of normal for that individual that is not obvious from a distance but only on close inspection, a different hair style may be required to cover the hair loss but it does not require a wig or hair piece to camouflage  Patient Coping  Patient Coping: Accepting;Open/discussion  Distress Assessment  Distress Assessment Score: 4  Accompanied by  Accompanied by: Alone  Oral Chemo Adherence       Past Histories:    Past Medical History:   Diagnosis Date     Diabetes mellitus (H)      Hypercholesteremia      Hypertension      Sleep apnea        Physical Exam:    Recent Vitals 12/5/2018   Height -   Weight 157 lbs 6 oz   BSA (m2) 1.88 m2   /73   Pulse 96   Temp 97.5   Temp src 1   SpO2 99   Some recent data might be hidden     General:  Alert.  Pleasant.  No acute distress.    HEENT:  Anicteric sclera.  DASHAWN.  EOMI.  No mucosal lesions.  Lymphatics:  No abnormally enlarged lymph nodes.  Chest:   Lungs clear to auscultation.  Heart:   S1 S2 heard.  RRR.  No murmur heard.   Abdomen:  Soft.  Not tender.  Not distended.  No masses, organomegaly or guarding.  Biliary drain present and capped - dressing dry.  Extremities:  No edema.  Skin:   No rash observed.  CNS:   Non focal.    Lab Results:    Reviewed with patient.    Recent Results (from the past 24 hour(s))   Magnesium   Result Value Ref Range    Magnesium 2.2 1.8 - 2.6 mg/dL   Comprehensive Metabolic Panel   Result Value Ref Range    Sodium 140 136 - 145 mmol/L    Potassium 4.2 3.5 - 5.0 mmol/L    Chloride 106 98 - 107 mmol/L    CO2 26 22 - 31 mmol/L    Anion Gap, Calculation 8 5 - 18  mmol/L    Glucose 81 70 - 125 mg/dL    BUN 11 8 - 22 mg/dL    Creatinine 0.64 (L) 0.70 - 1.30 mg/dL    GFR MDRD Af Amer >60 >60 mL/min/1.73m2    GFR MDRD Non Af Amer >60 >60 mL/min/1.73m2    Bilirubin, Total 0.2 0.0 - 1.0 mg/dL    Calcium 9.0 8.5 - 10.5 mg/dL    Protein, Total 5.7 (L) 6.0 - 8.0 g/dL    Albumin 3.1 (L) 3.5 - 5.0 g/dL    Alkaline Phosphatase 136 (H) 45 - 120 U/L    AST 22 0 - 40 U/L    ALT 36 0 - 45 U/L   HM1 (CBC with Diff)   Result Value Ref Range    WBC 7.2 4.0 - 11.0 thou/uL    RBC 2.92 (L) 4.40 - 6.20 mill/uL    Hemoglobin 9.7 (L) 14.0 - 18.0 g/dL    Hematocrit 31.0 (L) 40.0 - 54.0 %     (H) 80 - 100 fL    MCH 33.2 27.0 - 34.0 pg    MCHC 31.3 (L) 32.0 - 36.0 g/dL    RDW 15.0 (H) 11.0 - 14.5 %    Platelets 285 140 - 440 thou/uL    MPV 8.6 8.5 - 12.5 fL    Neutrophils % 75 (H) 50 - 70 %    Lymphocytes % 14 (L) 20 - 40 %    Monocytes % 10 2 - 10 %    Eosinophils % 1 0 - 6 %    Basophils % 0 0 - 2 %    Neutrophils Absolute 5.4 2.0 - 7.7 thou/uL    Lymphocytes Absolute 1.0 0.8 - 4.4 thou/uL    Monocytes Absolute 0.7 0.0 - 0.9 thou/uL    Eosinophils Absolute 0.0 0.0 - 0.4 thou/uL    Basophils Absolute 0.0 0.0 - 0.2 thou/uL         Imaging:    No new imaging.

## 2021-06-22 NOTE — PROGRESS NOTES
[facility] Hematology and Oncology Progress Note    Patient: Gurpreet Gu  MRN: [id number]  Date of Service: 1/3/2019      Reason for Visit    Chief Complaint   Patient presents with     HE Cancer     Pancreatic Cancer       Assessment and Plan  Cancer Staging  Malignant neoplasm of head of pancreas (H)  Staging form: Exocrine Pancreas, AJCC 8th Edition  - Clinical stage from 7/24/2018: Stage IB (cT2, cN0, cM0) - Signed by Xochitl Quick MD on 7/24/2018      ECOG Performance   ECOG Performance Status: 1     Distress Assessment  Distress Assessment Score: 3(due to tx today)    Pain  Currently in Pain: No/denies  Pain Score (Initial OR Reassessment): No/Denies Pain  Location: left great toe, left abd    #.  Borderline resectable pancreatic cancer (3.8 x 4.1 cm by CT) pancreatic head mass   Labs were reviewed today.  Noted hemoglobin has improved.  Normal liver function and renal function.  He is feeling adequate to proceed with cycle #11 chemotherapy.  We will continue dose reduced oxaliplatin, irinotecan, and omission of bolus dose 5-FU.     Follow-up in about 2-1/2 weeks for his last cycle of chemotherapy.   After that, he will need a restaging CT chest abdomen pelvis and MR liver (to evaluate for small hypodense right hepatic lobe lesion).      #.  Relative hypotension.   Advised him to decrease lisinopril to 5 mg from 10 mg daily, Norvasc from 5 mg to 2.5 mg daily.     #.  Diarrhea, related to chemotherapy   Diarrhea improved.  Okay to continue with dose reduce irinotecan.  He will continue supportive care with Lomotil, pancreatic enzyme.     #.  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  Doing okay today.   Decreased oxaliplatin dose to 65 mg/m .     #.  Obstructive jaundice, resolved      #.  Hypomagnesemia.  Normal today.   Continue oral magnesium oxide 400 mg twice daily.    #.  Mild hypokalemia, resolved.   Encouraged dietary supplementation.    #.  Diabetes type 2.   He is managing with oral hypoglycemic agent  as well as insulin prn at this point.      Problem List    1. Malignant neoplasm of head of pancreas (H)  HM1(CBC and Differential)    Magnesium    Comprehensive Metabolic Panel    HM1 (CBC with Diff)    JIC RED   2. Encounter for antineoplastic chemotherapy  HM1(CBC and Differential)    Magnesium    Comprehensive Metabolic Panel    HM1 (CBC with Diff)    JIC RED   3. CA head of pancreas (H)  HM1(CBC and Differential)    Magnesium    Comprehensive Metabolic Panel    HM1 (CBC with Diff)      ______________________________________________________________________________  Diagnosis  May 2018- new ill-defined hypodensity in the pancreatic head mass measuring about 3.5 cm by CT scan upon evaluation of left lower quadrant pain.  6/7/2018-FNA of the pancreatic head mass was positive for adenocarcinoma.  6/22/2018: PET/CT scan show ill-defined mass along the upper anterior margin of the pancreas adjacent to the duodenum with moderate FDG avidity, SUV max 4.7.  No additional sites of suspicious FDG avidity.    Treatment to date  7/24/2018 -neoadjuvant chemotherapy with FOLFIRINOX.  Dose reduced in first cycle due to hyperbilirubinemia :5-FU to 50% (1400 mg/m  from 2800 mg/m ), irinotecan dose to 135 mg/m  from 180 mg/m .  Increased to full dose starting cycle #2 till C#6 of chemotherapy.    -Decreased irinotecan dose to 135 mg/m  from 180 mg/m  due to persistent diarrhea (watery about 12 times per day) throughout the cycle, and decreased oxaliplatin to 65 mg/m  due to persistent and worsening neuropathy in hands> feet and intolerable cold sensitivity starting cycle #7.    History of Present Illness    Gurpreet presents today for 11th cycle of chemotherapy.    He has gained about 11 pounds.  His biliary drain is out.  No further abdominal pain.  Slightly nauseated today and felt it was related to eating his breakfast too fast.  Diarrhea was much better after 2 weeks delay in chemotherapy because of the family visiting him during  "the holiday season.  Overall he is doing really well.  He was very nervous about the next step of reimaging and surgery.    Pain Status  Currently in Pain: No/denies    Review of Systems    Constitutional  Constitutional (WDL): Exceptions to WDL  Fatigue: Fatigue not relieved by rest - Limiting instrumental ADL  Fever: None  Chills: None  Weight Gain: 5 - <10% from baseline(11# 12/13/18)  Weight Loss: None  Neurosensory  Neurosensory (WDL): Exceptions to WDL  Peripheral Motor Neuropathy: Asymptomatic, clinical or diagnostic observations only, intervention not indicated  Ataxia: Asymptomatic, clinical or diagnostic observations only, intervention not indicated  Peripheral Sensory Neuropathy: Asymptomatic, loss of deep tendon reflexes or paresthesia  Confusion: None  Syncope: None  Eye   Eye Disorder (WDL): All eye disorder elements are within defined limits  Ear  Ear Disorder (WDL): All ear disorder elements are within defined limits  Cardiovascular  Cardiovascular (WDL): All cardiovascular elements are within defined limits  Pulmonary  Respiratory (WDL): Exceptions to WDL  Cough: Mild symptoms, nonprescription intervention indicated(occ, dry)  Dyspnea: Shortness of breath with moderate exertion  Hypoxia: None  Gastrointestinal  Gastrointestinal (WDL): Exceptions to WDL  Anorexia: None  Constipation: Occasional or intermittent symptoms, occasional use of stool softeners, laxatives, dietary modification, or enema(alternates between diarrhea and constipation)  Diarrhea: Increase of <4 stools per day over baseline, mild increase in ostomy output compared to baseline(\"much better\")  Dysphagia: None  Esophagitis: None(\"very little\")  Nausea: Loss of appetite without alteration in eating habits(this morning-thinks ate breakfast too fast)  Pharyngitis: None  Vomiting: None  Dysgeusia: None  Dry Mouth: Symptomatic (e.g., dry or thick saliva) without significant dietary alteration, unstimulated saliva flow >0.2 " ml/min  Genitourinary  Genitourinary (WDL): All genitourinary elements are within defined limits  Lymphatic  Lymph (WDL): All lymph disorder elements are within defined limits  Musculoskeletal and Connective Tissue  Musculoskeletal and Connetive Tissue Disorders (WDL): Exceptions to WDL  Arthralgia: Mild pain(intermittent L great toe)  Bone Pain: None  Muscle Weakness : Symptomatic, perceived by patient but not evident on physical exam  Myalgia: None  Integumentary  Integumentary (WDL): Exceptions to WDL  Alopecia: Hair loss of >50% normal for that individual that is readily apparent to others, a wig or hair piece is necessary if the patient desires to completely camouflage the hair loss, associated with psychosocial impact  Rash Maculo-Papular: None  Pruritus: None  Urticaria: None  Palmar-Plantar Erythrodysesthesia Syndrome: None  Patient Coping  Patient Coping: Accepting  Distress Assessment  Distress Assessment Score: 3(due to tx today)  Accompanied by  Accompanied by: Alone  Oral Chemo Adherence       Past History  Past Medical History:   Diagnosis Date     Diabetes mellitus (H)      Hypercholesteremia      Hypertension      Sleep apnea        Physical Exam    Recent Vitals 1/3/2019   Height -   Weight 161 lbs 13 oz   BSA (m2) 1.9 m2   /57   Pulse 93   Temp 97.9   Temp src 1   SpO2 93   Some recent data might be hidden     General: alert, awake, not in acute distress  HEENT: Head: Normal, normocephalic, atraumatic.  Eye: Normal external eye, DASHAWN.    Ears: Non-tender.  Nose: Normal external nose, mucus membranes and septum.  Pharynx:  Normal pharynx.  Neck / Thyroid: Supple, no masses, nodes, nodules or enlargement.  Lymphatics: No abnormally enlarged lymph nodes.  Chest: Normal chest wall and respirations. Clear to auscultation.  Heart: S1 S2 RRR, no murmur.   Abdomen: abdomen is soft without significant tenderness, masses, organomegaly or guarding.    Extremities: normal strength, tone, and muscle mass.   Poor dorsiflexion of the left foot.  Skin: normal. no rash or abnormalities  CNS: non focal.    Lab Results    Recent Results (from the past 168 hour(s))   HM1 (CBC with Diff)   Result Value Ref Range    WBC 5.1 4.0 - 11.0 thou/uL    RBC 3.60 (L) 4.40 - 6.20 mill/uL    Hemoglobin 11.6 (L) 14.0 - 18.0 g/dL    Hematocrit 36.2 (L) 40.0 - 54.0 %     (H) 80 - 100 fL    MCH 32.2 27.0 - 34.0 pg    MCHC 32.0 32.0 - 36.0 g/dL    RDW 13.8 11.0 - 14.5 %    Platelets 191 140 - 440 thou/uL    MPV 9.6 8.5 - 12.5 fL    Neutrophils % 65 50 - 70 %    Lymphocytes % 19 (L) 20 - 40 %    Monocytes % 15 (H) 2 - 10 %    Eosinophils % 1 0 - 6 %    Basophils % 0 0 - 2 %    Neutrophils Absolute 3.3 2.0 - 7.7 thou/uL    Lymphocytes Absolute 1.0 0.8 - 4.4 thou/uL    Monocytes Absolute 0.7 0.0 - 0.9 thou/uL    Eosinophils Absolute 0.1 0.0 - 0.4 thou/uL    Basophils Absolute 0.0 0.0 - 0.2 thou/uL       Imaging    Ir Cholangiogram Exisiting Access    Result Date: 12/6/2018  [facility] RADIOLOGY LOCATION: [facility] DATE: 12/6/2018 PROCEDURE: 1. CHOLANGIOGRAM WITH BILIARY CATHETER REMOVAL INTERVENTIONAL RADIOLOGIST: Elliott Pressley MD HISTORY: 63 years-old Male with history of pancreatic cancer and malignant biliary obstruction status post biliary stent placement 7/19/2018. The patient underwent biliary plasty 11/29/2018. Following that time the catheter was capped. The patient has tolerated capping trial well without evidence of jaundice, pruritus, or leaking of bile from around the catheter site. The patient returns for cholangiogram with possible biliary catheter removal. CONSENT: The risks, benefits and alternatives of the procedure were discussed with the patient  in detail. All questions were answered. Informed consent was given to proceed with the procedure. SEDATION: None. CONTRAST: 10 mL Omni 350 ANTIBIOTICS: None. ADDITIONAL MEDICATIONS: None. FLUOROSCOPIC TIME: 0.2 RADIATION DOSE: Air Kerma: 10 mGy. COMPLICATIONS: No  immediate complications. PROCEDURE: A  image was obtained. Aspiration from the existing drainage catheter yielded bilious drainage. Contrast was slowly injected into the catheter and images were obtained in anteroposterior and oblique projections. At this point, the decision was made to remove the catheter. The securing suture was removed. The pigtail locking mechanism was disengaged. The catheter was removed in total. A clean and sterile dressing was applied. The patient tolerated the procedure well. FINDINGS: Aspiration from the existing drainage catheter yielded bilious output. Cholangiogram by a contrast injection through the catheter demonstrated patency of the biliary stent with prompt passage of contrast into the small bowel. The intrahepatic bile ducts were noted to be decompressed. A mild in-stent stenosis was noted along its proximal extent although no stasis at this level was evident.     Patency of the biliary stent with prompt passage of contrast into the small bowel. A mild proximal stenosis was noted although this was not noted to result in a functional stenosis. The patient understands that if he were to experience repeat jaundice or  signs of biliary obstruction may need repeat biliary catheter placement. The biliary catheter was removed. CPT codes for physician use only: 19934 [id number]     TT: 40 minutes and more than 50% was spent on coordination and counseling of care.    Signed by: Xochitl Quick MD

## 2021-06-22 NOTE — PROGRESS NOTES
Pt presented to clinic for labs and provider visit for abdominal pain. Pt has pancreas cancer. Marjorie Mcginnis RN

## 2021-06-22 NOTE — PROGRESS NOTES
[facility] Hematology and Oncology Progress Note    Patient: Gurpreet Gu  MRN: [id number]  Date of Service: 11/28/2018         Reason for Visit:    D1C10 neoadjuvant FOLFIRINOX chemotherapy.    Assessment and Plan:    1) Malignant neoplasm of head of pancreas (H)     Stage IB (cT2, cN0, cM0)     63 y.o.     2018, April - evaluation of left lower quadrant pain:    05/03/18 CT A&P - new ill-defined hypodensity in the pancreatic head, mass measuring about 3.5 cm.     06/07/2018 FNA of the pancreatic head mass - positive for adenocarcinoma.    06/22/2018 PET/CT - ill-defined mass along the upper anterior margin of the pancreas adjacent to the duodenum with moderate FDG avidity, SUV max 4.7.  No additional sites of suspicious FDG avidity.    Borderline resectable pancreatic cancer (3.8 x 4.1 cm by CT and 43 x 33 mm pancreatic head mass.    07/19/18 biliary drain placed by IR.    07/24/18 D1C1 FOLFIRINOX (dose reduced 5-FU and irinotecan due to hyperbilirubinemia) + Neulasta.    11/28/18:    CBC - WBC, ANC and Plts WNL.  HgB down a bit @ 9.2.    CMP - Alk Phos dropping @ 133.  AST, ALT and Bili WNL. Slightly elevated non-fasting BS.  Worsening hypoalbuminemia.    Significantly improved hyperbilirubinemia from obstructive jaundice.     Magnesium - WNL.    Continue oral magnesium oxide 400 mg once daily.    The patient presents in good spirits, looking quite good but noting return of mild ABD discomfort with biliary drain dressing saturated and malodorous.  With the discomfort he has now resumed 2 mg dilaudid and OxyContin analgesia.  His appetite has dropped a bit and his weight has dropped a few more pounds after losing 20-25 pounds since starting C1. Stable, manageable fatigue.  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  He notes minimal, mild nausea managed with rare antiemetics.  He's had no emesis.  He continues to note quite significant cold hypersensitivity related to the oxaliplatin for ~ 8 days.  Acute  onset (L) foot drop improved with time.  He states he has an appetite and believes he is eating good.  Loose stools continue post-op.       Will HOLD D1C10 neoadjuvant FOLFIRINOX chemotherapy + Neulasta until seen by IR regarding leakage around biliary drain and return of abdominal discomfort.    Received standard dosing irinotecan and 5-FU C2-C6.    Received dose reduced irinotecan since C7 due to persistent loose/diarrea stools.    Patient instructed to contact us if temp > 100.4F as antibiotics and even hospitalization may be indicated.    Intermittent dizziness -resolved.    /60 - .    Continue decreased antihypertensives:    Lisinopril from 10 to 5 mg daily    Norvasc from 5 to 2.5 mg daily.    Follow up with PCP    Diarrhea/constipation.    Alternates.    Continue dose reduced irinotecan 10%.    Continue Lomotil for diarrhea, 2 tabs after first episode diarrhea each day followed by 1 tab after each subsequent episode diarrhea each day.  Max 8 tabs daily.      General diet and fluids manage any constipation.    Continue Creon 3000/[id number] with each meal.     12/05/18 - follow up one week delayed C10 neoadjuvant FOLFIRINOX chemotherapy + Neulasta.    Treatment goal currently is curative with neoadjuvant FOLFIRINOX chemotherapy followed by reassessment for surgical resectability.        2) Medical cannabis:    Patient requested registry for pain - completed.      Has not returned their application.    3) Acute left foot drop:    Improving with time.  No lumbosacral radiculopathy or paresthesia.      ? related to/exacerbated by not well controlled diabetes + oxaliplatin-related neuropathy.  Possibly getting better.    08/30/18 MR L-spine - Unremarkable appearance of the cord and cauda equina nerve roots. Mild degenerative changes as above, without sites of high-grade spinal or neural foraminal stenosis. No evidence of bony metastatic disease.    Has not yet seen Neurology.  Cancelled original  appointment due to trip to see daughter.    4) Obstructive jaundice:    Not amendable for endoscopic stent placement.      07/19/2018 - Biliary drain placed by IR.      Still having significant output at this point although bilirubin is normalized.      He will be referred back to interventional radiology once the drainage is less - still a lot of drainage (2-3 full bags/day).        5) Diabetes type 2:    BS today @ 164.      On Glucophage + Jardiance + Amaryl.    PCP instructed on sliding scale insulin while receiving chemotherapy.  Has not used it yet.    6) Pain:    Previously noted mid-abdominal pain radiating to his back had resolved but returned this past week with biliary tube leakage.    Has now again been using dilaudid 2 mg every 6 hours, previously not even daily, and he has resumed OxyContin every 12 hours.     ECOG Performance:     ECOG Performance Status: 1     Distress Assessment:    Distress Assessment Score: 4    Pain:    Currently in Pain: Yes  Pain Score (Initial OR Reassessment): 4  Location: abdomen        TT > 25 minutes face to face with > 50% counseling and care coordination.    Ken Eid, CNP   ______________________________________________    Interim History:    The patient presents looking quite good but noting return of mild ABD discomfort with his biliary drain dressing saturated and malodorous.  He has now resumed 2 mg dilaudid and OxyContin analgesia.  His appetite has dropped a bit and his weight decreased a few more pounds after losing 20-25 pounds since starting C1.  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  He notes minimal, mild nausea managed with rare antiemetics.  He's had no emesis.  He continues to note quite significant cold hypersensitivity related to the oxaliplatin for ~ 8 days.  He denies any other neuropathies or paresthesias.  His acute onset (L) foot drop continues to improve with time.  He continues with loose  stools continue post-op.  He denies cough, fever, chills, unusual headaches, visual or mentation disturbance, bladder issues.     Review of Systems:    Constitutional  Constitutional (WDL): Exceptions to WDL  Fatigue: Fatigue not relieved by rest - Limiting instrumental ADL  Neurosensory  Neurosensory (WDL): Exceptions to WDL  Peripheral Motor Neuropathy: Asymptomatic, clinical or diagnostic observations only, intervention not indicated  Ataxia: Asymptomatic, clinical or diagnostic observations only, intervention not indicated  Eye   Eye Disorder (WDL): Exceptions to WDL  Blurred Vision: Intervention not indicated  Ear  Ear Disorder (WDL): All ear disorder elements are within defined limits  Cardiovascular  Cardiovascular (WDL): Exceptions to WDL  Edema: Yes  Edema Limbs: 5 - 10% inter-limb discrepancy in volume or circumference at point of greatest visible difference, swelling or obscuration of anatomic architecture on close inspection  Pulmonary  Respiratory (WDL): Within Defined Limits  Dyspnea: Shortness of breath with moderate exertion  Gastrointestinal  Gastrointestinal (WDL): Exceptions to WDL  Anorexia: Loss of appetite without alteration in eating habits  Nausea: Loss of appetite without alteration in eating habits  Genitourinary  Genitourinary (WDL): All genitourinary elements are within defined limits  Lymphatic  Lymph (WDL): All lymph disorder elements are within defined limits  Musculoskeletal and Connective Tissue  Musculoskeletal and Connetive Tissue Disorders (WDL): Exceptions to WDL  Arthralgia: Mild pain  Bone Pain: Mild pain(left foot big toe)  Muscle Weakness : Symptomatic, perceived by patient but not evident on physical exam  Myalgia: Mild pain  Integumentary  Integumentary (WDL): Exceptions to WDL  Alopecia: Hair loss of up to 50% of normal for that individual that is not obvious from a distance but only on close inspection, a different hair style may be required to cover the hair loss but it  does not require a wig or hair piece to camouflage  Patient Coping  Patient Coping: Accepting;Open/discussion  Distress Assessment  Distress Assessment Score: 4  Accompanied by  Accompanied by: Alone  Oral Chemo Adherence       Past Histories:    Past Medical History:   Diagnosis Date     Diabetes mellitus (H)      Hypercholesteremia      Hypertension      Sleep apnea        Physical Exam:    Recent Vitals 11/28/2018   Weight 154 lbs 6 oz   BSA (m2) 1.86 m2   /60   Pulse 101   Temp 98.6   Temp src 1   SpO2 98   Some recent data might be hidden     General:  Alert.  Pleasant.  No acute distress.    HEENT:  Anicteric sclera.  DASHAWN.  EOMI.  No mucosal lesions.  Lymphatics:  No abnormally enlarged lymph nodes.  Chest:   Lungs clear to auscultation.  Heart:   S1 S2 heard.  RRR.  No murmur heard.   Abdomen:  Soft.  Not tender.  Not distended.  No masses, organomegaly or guarding.  Biliary drain present and draining - dressing saturated.  Extremities:  No edema.  Skin:   No rash observed.  CNS:   Non focal.    Lab Results:    Reviewed with patient.    Recent Results (from the past 168 hour(s))   Magnesium   Result Value Ref Range    Magnesium 1.8 1.8 - 2.6 mg/dL   Comprehensive Metabolic Panel   Result Value Ref Range    Sodium 138 136 - 145 mmol/L    Potassium 3.9 3.5 - 5.0 mmol/L    Chloride 102 98 - 107 mmol/L    CO2 30 22 - 31 mmol/L    Anion Gap, Calculation 6 5 - 18 mmol/L    Glucose 152 (H) 70 - 125 mg/dL    BUN 11 8 - 22 mg/dL    Creatinine 0.62 (L) 0.70 - 1.30 mg/dL    GFR MDRD Af Amer >60 >60 mL/min/1.73m2    GFR MDRD Non Af Amer >60 >60 mL/min/1.73m2    Bilirubin, Total 0.2 0.0 - 1.0 mg/dL    Calcium 8.7 8.5 - 10.5 mg/dL    Protein, Total 5.5 (L) 6.0 - 8.0 g/dL    Albumin 2.8 (L) 3.5 - 5.0 g/dL    Alkaline Phosphatase 133 (H) 45 - 120 U/L    AST 15 0 - 40 U/L    ALT 16 0 - 45 U/L   HM1 (CBC with Diff)   Result Value Ref Range    WBC 9.8 4.0 - 11.0 thou/uL    RBC 2.76 (L) 4.40 - 6.20 mill/uL    Hemoglobin  9.2 (L) 14.0 - 18.0 g/dL    Hematocrit 29.0 (L) 40.0 - 54.0 %     (H) 80 - 100 fL    MCH 33.3 27.0 - 34.0 pg    MCHC 31.7 (L) 32.0 - 36.0 g/dL    RDW 14.8 (H) 11.0 - 14.5 %    Platelets 270 140 - 440 thou/uL    MPV 8.9 8.5 - 12.5 fL    Neutrophils % 79 (H) 50 - 70 %    Lymphocytes % 9 (L) 20 - 40 %    Monocytes % 12 (H) 2 - 10 %    Eosinophils % 0 0 - 6 %    Basophils % 0 0 - 2 %    Neutrophils Absolute 7.7 2.0 - 7.7 thou/uL    Lymphocytes Absolute 0.9 0.8 - 4.4 thou/uL    Monocytes Absolute 1.2 (H) 0.0 - 0.9 thou/uL    Eosinophils Absolute 0.0 0.0 - 0.4 thou/uL    Basophils Absolute 0.0 0.0 - 0.2 thou/uL         Imaging:    No new imaging.

## 2021-06-22 NOTE — PROGRESS NOTES
[facility] Hematology and Oncology Progress Note    Patient: Gurpreet Gu  MRN: [id number]  Date of Service: 12/13/2018         Reason for Visit:    Patient asked to be seen with new abdominal pain s/p biliary drain removal.    Assessment and Plan:    1) Malignant neoplasm of head of pancreas (H)     Stage IB (cT2, cN0, cM0)     63 y.o.     2018, April - evaluation of left lower quadrant pain:    05/03/18 CT A&P - new ill-defined hypodensity in the pancreatic head, mass measuring about 3.5 cm.     06/07/2018 FNA of the pancreatic head mass - positive for adenocarcinoma.    06/22/2018 PET/CT - ill-defined mass along the upper anterior margin of the pancreas adjacent to the duodenum with moderate FDG avidity, SUV max 4.7.  No additional sites of suspicious FDG avidity.    Borderline resectable pancreatic cancer (3.8 x 4.1 cm by CT and 43 x 33 mm pancreatic head mass.    07/19/18 biliary drain placed by IR.    07/24/18 D1C1 FOLFIRINOX (dose reduced 5-FU and irinotecan due to hyperbilirubinemia) + Neulasta.    11/08/18 CT CAP - Pancreatic head mass has significantly decreased in size.  External biliary drain and internal biliary stent with decompression of the intrahepatic biliary system.  Stable small hypodense right hepatic lobe lesion. No other focal liver lesions.  No adenopathy within the chest, abdomen or pelvis.    11/28/18 - HELD D1C10 neoadjuvant FOLFIRINOX chemotherapy + Neulasta for IR to replace biliary drain.    12/05/18 - proceeded one week delayed C10 neoadjuvant FOLFIRINOX chemotherapy + Neulasta.    C2 through C6 received standard dosing irinotecan and 5-FU.    C7 on - received dose reduced irinotecan due to persistent loose/diarrea stools.    C7, C9 and C10 - received ~20% dose reduced oxaliplatin.    12/13/18:    CBC - Neutrophilia (Neulasta).  Plts WNL.  HgB increased @ 10.9.    LFTs - alk phos up a bit @ 200.  Otherwise AST, ALT and total and direct bili WNL.    The patient presents in good spirits,  "looking quite good but noted return of ABD discomfort about 3 days after biliary drain was removed last week.  The pain, however is on the (L) not the (R) UQ.  He states the discomfort was initially \"severe\" requiring 2-4 mg dilaudid every 2-4 hours despite resuming the OxyContin every 12 hours.  He states the pain has now significantly decreased, but still on the OxyContin and occasional dilaudid.  Since the biliary tube was pulled his loose stools have firmed up and even borderlines now on mild constipation.  He is afebrile.  His weight has decreased a few pounds again, now ~35 pounds since C1.  He states he has an apetite and is eating the same, so not sure why weight has dropped.  His fatigue is stable and manageable.  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  He denies nausea and hasn't needed any antiemetics for quite some time.  He continues to note quite significant cold hypersensitivity related to the oxaliplatin for ~ 8 days.  Recently he has noted occasional pain in his (L) big toe.    With 11/08/18 CT CAP showing significant disease improvement, I discussed return of ABD symptoms with IR physician and if it could be related to removal of biliary drain:    He doubted any relationship to DCing biliary tube since total and direct bili's are WNL and his pain was in the (L), not the (R) UQ of ABD.      With significant changes in BM since biliary tube removed from multiple loose/diarrhea stools to formed and somewhat constipated BMs, the discomfort could be bowel motility related.  With pain mostly resolved now will monitor.  Instructed to begin MirALAX as needed.    Instructed patient to present to ED if return of ABD pain where CT A&P could be performed with immediate interpretation and needed interventions.    Intermittent dizziness resolved:    /77 - .    Continue decreased antihypertensives:    Lisinopril from 10 to 5 mg daily    Norvasc from 5 to 2.5 mg daily.    Follows with " PCP    Diarrhea:    Resolved.  Notes ~ 4 small formed stools/day since biliary drain removed.    Has not used any Lomotil for a quite awhile.      Continue Creon 3000/9500/29499 with each meal.     Continue dose reduced irinotecan 10%.    Uric acid WNL - monitor (L) big toe (? gouty symptoms).    Patient instructed to contact us if temp > 100.4F as antibiotics and even hospitalization may be indicated.    12/19/18 - follow up D1C11 neoadjuvant FOLFIRINOX chemotherapy + Neulasta.    Treatment goal currently is curative with neoadjuvant FOLFIRINOX chemotherapy followed by reassessment for surgical resectability.        2) Medical cannabis:    Patient requested registry for pain - completed.      Has not returned their application, but intends to.    3) Acute left foot drop:    Improving with time.  No lumbosacral radiculopathy or paresthesia.      ? related to/exacerbated by not well controlled diabetes + oxaliplatin-related neuropathy.  Possibly getting better.    08/30/18 MR L-spine - Unremarkable appearance of the cord and cauda equina nerve roots. Mild degenerative changes as above, without sites of high-grade spinal or neural foraminal stenosis. No evidence of bony metastatic disease.    Has not yet seen Neurology.  Cancelled original appointment due to trip to see daughter.    4) Obstructive jaundice:    Not amendable for endoscopic stent placement.      07/19/2018 - Biliary drain placed by IR.      11/29/18 - biliary drain replaced by IR.    11/29 - 12/06 capping trial.      No fevers, chills, right upper quadrant pain, jaundice, or leakage around the catheter.    12/06/18 cholangiogram:    Patency of the biliary stent with prompt passage of contrast into the small bowel - a mild proximal stenosis was noted although this was not noted to result in a functional stenosis.     Biliary catheter removed - the patient understands that if he were to experience repeat jaundice or signs of biliary obstruction that he  "may need repeat biliary catheter placement.      5) Diabetes type 2:     On Glucophage + Jardiance + Amaryl.    PCP instructed on sliding scale insulin while receiving chemotherapy.  Has not needed it yet.    ECOG Performance:     ECOG Performance Status: 1     Distress Assessment:    Distress Assessment Score: 4    Pain:    Currently in Pain: No/denies  Pain Score (Initial OR Reassessment): 2  Location: left abdomen, taking pain pills as often as he can        TT > 25 minutes face to face with > 50% counseling and care coordination.    Ken Eid, [facility]   ___________________________________________    Interim History:    The patient presents noting increasing abdominal discomfort with biliary tube DC'd one week ago.  His ABD discomfort initially mostly resolved after the biliary catheter was replaced two weeks ago.  He noted no fevers, chills, right upper quadrant pain, jaundice, or leakage around the catheter for a week.  Therefore cholagiogram was performed on 12/06 and the decision was made to remove the drain.  He states the ABD discomfort returned about 3 days after biliary drain was removed.  The pain, however, is on the (L) not the (R) UQ.  He states the discomfort was initially \"severe\" requiring 2-4 mg dilaudid every 2-4 hours despite resuming the OxyContin every 12 hours.  Over the past day or so the pain has significantly decreased, but he is still on the OxyContin and occasional dilaudid.  Since the biliary tube was pulled his loose stools have firmed up.  He now borderlines on mild constipation.  He is afebrile.  His weight has decreased a few pounds again, now ~35 pounds since C1.  He states he has an apetite and is eating the same, so not sure why weight has dropped.  His fatigue is stable and manageable.  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  He denies nausea and hasn't needed any antiemetics for quite some time.  He continues to note quite significant cold hypersensitivity related " to the oxaliplatin for ~ 8 days.  Recently he has noted occasional pain in his (L) big toe.  He denies cough, fever, chills, unusual headaches, visual or mentation disturbance, bladder issues.     Review of Systems:    Constitutional  Constitutional (WDL): Exceptions to WDL  Fatigue: Fatigue not relieved by rest - Limiting instrumental ADL  Weight Loss: to <10% from baseline, intervention not indicated  Neurosensory  Neurosensory (WDL): Exceptions to WDL  Peripheral Motor Neuropathy: Asymptomatic, clinical or diagnostic observations only, intervention not indicated  Ataxia: Asymptomatic, clinical or diagnostic observations only, intervention not indicated  Peripheral Sensory Neuropathy: Asymptomatic, loss of deep tendon reflexes or paresthesia  Eye   Eye Disorder (WDL): All eye disorder elements are within defined limits  Ear  Ear Disorder (WDL): Assessment not pertinent to visit  Cardiovascular  Cardiovascular (WDL): All cardiovascular elements are within defined limits  Pulmonary  Respiratory (WDL): Exceptions to WDL  Dyspnea: Shortness of breath with moderate exertion(with walking)  Gastrointestinal  Gastrointestinal (WDL): Exceptions to WDL  Nausea: Loss of appetite without alteration in eating habits  Dry Mouth: Symptomatic (e.g., dry or thick saliva) without significant dietary alteration, unstimulated saliva flow >0.2 ml/min  Genitourinary  Genitourinary (WDL): All genitourinary elements are within defined limits  Lymphatic  Lymph (WDL): All lymph disorder elements are within defined limits  Musculoskeletal and Connective Tissue  Musculoskeletal and Connetive Tissue Disorders (WDL): Exceptions to WDL  Arthralgia: Mild pain(big toe left foot)  Bone Pain: Mild pain  Muscle Weakness : Symptomatic, perceived by patient but not evident on physical exam  Integumentary  Integumentary (WDL): Exceptions to WDL  Alopecia: Hair loss of >50% normal for that individual that is readily apparent to others, a wig or hair  "piece is necessary if the patient desires to completely camouflage the hair loss, associated with psychosocial impact  Patient Coping  Patient Coping: Accepting  Distress Assessment  Distress Assessment Score: 4  Accompanied by  Accompanied by: Alone  Oral Chemo Adherence       Past Histories:    Past Medical History:   Diagnosis Date     Diabetes mellitus (H)      Hypercholesteremia      Hypertension      Sleep apnea        Physical Exam:    Recent Vitals 12/6/2018   Height 5' 10\"   Weight 157 lbs   BSA (m2) 1.88 m2   /68   Pulse 83   Temp 98.5   Temp src 1   SpO2 100   Some recent data might be hidden     General:  Alert.  Pleasant.  No acute distress.    HEENT:  Anicteric sclera.  DASHAWN.  EOMI.  No mucosal lesions.  Lymphatics:  No abnormally enlarged lymph nodes.  Chest:   Lungs clear to auscultation.  Heart:   S1 S2 heard.  RRR.  No murmur heard.   Abdomen:  Soft.  Not tender or distended.  No masses, organomegaly or guarding.  Distant BS.  Extremities:  No edema.  Skin:   No rash observed.  CNS:   Non focal.    Lab Results:    Reviewed with patient.    Recent Results (from the past 24 hour(s))   Hepatic Profile   Result Value Ref Range    Bilirubin, Total 0.2 0.0 - 1.0 mg/dL    Bilirubin, Direct <0.1 <=0.5 mg/dL    Protein, Total 5.7 (L) 6.0 - 8.0 g/dL    Albumin 3.3 (L) 3.5 - 5.0 g/dL    Alkaline Phosphatase 200 (H) 45 - 120 U/L    AST 11 0 - 40 U/L    ALT 19 0 - 45 U/L   HM1 (CBC with Diff)   Result Value Ref Range    WBC 22.8 (H) 4.0 - 11.0 thou/uL    RBC 3.27 (L) 4.40 - 6.20 mill/uL    Hemoglobin 10.9 (L) 14.0 - 18.0 g/dL    Hematocrit 33.9 (L) 40.0 - 54.0 %     (H) 80 - 100 fL    MCH 33.3 27.0 - 34.0 pg    MCHC 32.2 32.0 - 36.0 g/dL    RDW 14.8 (H) 11.0 - 14.5 %    Platelets 235 140 - 440 thou/uL    MPV 9.6 8.5 - 12.5 fL    Neutrophils % 80 (H) 50 - 70 %    Lymphocytes % 5 (L) 20 - 40 %    Monocytes % 14 (H) 2 - 10 %    Eosinophils % 0 0 - 6 %    Basophils % 0 0 - 2 %    Neutrophils Absolute " 18.1 (H) 2.0 - 7.7 thou/uL    Lymphocytes Absolute 1.2 0.8 - 4.4 thou/uL    Monocytes Absolute 3.2 (H) 0.0 - 0.9 thou/uL    Eosinophils Absolute 0.1 0.0 - 0.4 thou/uL    Basophils Absolute 0.1 0.0 - 0.2 thou/uL   Manual Differential   Result Value Ref Range    Platelet Estimate Normal Normal   Uric Acid   Result Value Ref Range    Uric Acid 4.4 3.0 - 8.0 mg/dL      Imaging:    No new imaging.

## 2021-06-22 NOTE — PROGRESS NOTES
Pt arrived ambulatory to clinic for Cycle # 11 Day # 3 of his chemotherapy regimen.  Port flushed easily with excellent blood return.  Pump was empty at time of D/C.  Port was flushed with NS and Heparin then de-accessed using 2x2 and papertape.  Placed on-body Neulasta injector on pt's RUQ of ABD per pt request.  Pt verbalized understanding of plan of care and return to clinic.

## 2021-06-22 NOTE — PROGRESS NOTES
Pt drain biliary drain replaced and capped on 11/29/18. Pt is due for follow up drain check in 1 week. [facility] Radiology called appt set up for 1:30 tomorrow 12/6 at [facility].  Pt also will fax la paperwork to us. Handicapped paperwork signed and given to pt. .Marjorie Mcginnis RN

## 2021-06-23 NOTE — TELEPHONE ENCOUNTER
Left message for pt that his creon and dilaudid meds have been refilled at his pharmacy. Also pt should ask Dr Magana when he has his surgical appt if a PET scan is something he would want before surgery. If so he would have great influence on insurance approval. Marjorie Mcginnis RN

## 2021-06-23 NOTE — PROGRESS NOTES
Gurpreet presents for D3C12 folfirinox PUMP d/c today. No new c/o. Pump complete and port heparinized and deaccessed. Neulasta on the body injector administered on his LUQ. He was congratulated on completion of chemo and left ambulatory per self. Fatimah Mike

## 2021-06-23 NOTE — PROGRESS NOTES
[facility] Hematology and Oncology Progress Note    Patient: Gurpreet Gu  MRN: [id number]  Date of Service: 1/23/2019      Reason for Visit    Chief Complaint   Patient presents with     HE Cancer     Pancreatic Cancer       Assessment and Plan  Cancer Staging  Malignant neoplasm of head of pancreas (H)  Staging form: Exocrine Pancreas, AJCC 8th Edition  - Clinical stage from 7/24/2018: Stage IB (cT2, cN0, cM0) - Signed by Xochitl Quick MD on 7/24/2018      ECOG Performance   ECOG Performance Status: 1     Distress Assessment  Distress Assessment Score: 5(due to upcoming Whipple surgery)    Pain  Currently in Pain: Yes  Pain Score (Initial OR Reassessment): 2  Location: abdomen    #.  Borderline resectable pancreatic cancer (3.8 x 4.1 cm by CT) pancreatic head mass   He has grade 1-2 peripheral neuropathy in his fingertips and toes.  Diarrhea has resolved with reduction of chemotherapy dosing.  Labs were adequate today.  He is feeling adequate to proceed and finish his last cycle of chemotherapy.       We will continue dose reduced oxaliplatin, irinotecan, and omission of bolus dose 5-FU.     Follow-up in about 2-3weeks with labs and imaging.  Will request MRI liver to further evaluate the 6 mm hypodense lesion in the right hepatic lobe.  If there is no progression of disease, he will be refer back to Dr. Magana for surgical evaluation.    #.  Relative hypotension, resolved.      #.  Diarrhea, related to chemotherapy   Diarrhea resolved with dose reduce irinotecan.  He will continue supportive care with Lomotil, pancreatic enzyme.     #.  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 Stable.   Decreased oxaliplatin dose to 65 mg/m .     #.  Obstructive jaundice, resolved      #.  Hypomagnesemia.  Normal today.   Continue oral magnesium oxide 400 mg twice daily.    #.  Mild hypokalemia, resolved.   Encouraged dietary supplementation.    #.  Diabetes type 2.   He is managing with oral hypoglycemic agent as well as insulin  prn at this point.      Problem List    1. Encounter for antineoplastic chemotherapy  CC OFFICE VISIT LONG    Infusion Appointment    NURSE VISIT    DISCONTINUED: sodium chloride 0.9% 250 mL infusion    DISCONTINUED: palonosetron injection 0.25 mg (ALOXI)    DISCONTINUED: fosaprepitant 150 mg, dexamethasone 12 mg in sodium chloride 0.9% 150 mL    DISCONTINUED: atropine injection 0.5 mg    DISCONTINUED: oxaliplatin 130 mg in dextrose 5% 250 mL chemo (ELOXATIN)    DISCONTINUED: dextrose 5% 500 mL infusion    DISCONTINUED: irinotecan 280 mg in dextrose 5% 500 mL chemo (CAMPTOSAR)    DISCONTINUED: leucovorin 750 mg in dextrose 5% 250 mL (WELLCOVORIN)    DISCONTINUED: fluorouracil 2,400 mg/m2 = 4,600 mg in sodium chloride 0.9% 242 mL (ADRUCIL)   2. Malignant neoplasm of head of pancreas (H)  CC OFFICE VISIT LONG    Infusion Appointment    NURSE VISIT    CT Chest Abdomen Pelvis With Oral With IV Cont    Comprehensive Metabolic Panel    HM1(CBC and Differential)    Carbohydrate Antigen 19-9 (CA 19-9)    DISCONTINUED: sodium chloride 0.9% 250 mL infusion    DISCONTINUED: palonosetron injection 0.25 mg (ALOXI)    DISCONTINUED: fosaprepitant 150 mg, dexamethasone 12 mg in sodium chloride 0.9% 150 mL    DISCONTINUED: atropine injection 0.5 mg    DISCONTINUED: oxaliplatin 130 mg in dextrose 5% 250 mL chemo (ELOXATIN)    DISCONTINUED: dextrose 5% 500 mL infusion    DISCONTINUED: irinotecan 280 mg in dextrose 5% 500 mL chemo (CAMPTOSAR)    DISCONTINUED: leucovorin 750 mg in dextrose 5% 250 mL (WELLCOVORIN)    DISCONTINUED: fluorouracil 2,400 mg/m2 = 4,600 mg in sodium chloride 0.9% 242 mL (ADRUCIL)   3. CA head of pancreas (H)  CC OFFICE VISIT LONG    Infusion Appointment    NURSE VISIT    CT Chest Abdomen Pelvis With Oral With IV Cont    Comprehensive Metabolic Panel    HM1(CBC and Differential)    Carbohydrate Antigen 19-9 (CA 19-9)    DISCONTINUED: sodium chloride 0.9% 250 mL infusion    DISCONTINUED: palonosetron injection  0.25 mg (ALOXI)    DISCONTINUED: fosaprepitant 150 mg, dexamethasone 12 mg in sodium chloride 0.9% 150 mL    DISCONTINUED: atropine injection 0.5 mg    DISCONTINUED: oxaliplatin 130 mg in dextrose 5% 250 mL chemo (ELOXATIN)    DISCONTINUED: dextrose 5% 500 mL infusion    DISCONTINUED: irinotecan 280 mg in dextrose 5% 500 mL chemo (CAMPTOSAR)    DISCONTINUED: leucovorin 750 mg in dextrose 5% 250 mL (WELLCOVORIN)    DISCONTINUED: fluorouracil 2,400 mg/m2 = 4,600 mg in sodium chloride 0.9% 242 mL (ADRUCIL)      ______________________________________________________________________________  Diagnosis  May 2018- new ill-defined hypodensity in the pancreatic head mass measuring about 3.5 cm by CT scan upon evaluation of left lower quadrant pain.  6/7/2018-FNA of the pancreatic head mass was positive for adenocarcinoma.  6/22/2018: PET/CT scan show ill-defined mass along the upper anterior margin of the pancreas adjacent to the duodenum with moderate FDG avidity, SUV max 4.7.  No additional sites of suspicious FDG avidity.    Treatment to date  7/24/2018 -neoadjuvant chemotherapy with FOLFIRINOX.  Dose reduced in first cycle due to hyperbilirubinemia :5-FU to 50% (1400 mg/m  from 2800 mg/m ), irinotecan dose to 135 mg/m  from 180 mg/m .  Increased to full dose starting cycle #2 till C#6 of chemotherapy.    -Decreased irinotecan dose to 135 mg/m  from 180 mg/m  due to persistent diarrhea (watery about 12 times per day) throughout the cycle, and decreased oxaliplatin to 65 mg/m  due to persistent and worsening neuropathy in hands> feet and intolerable cold sensitivity starting cycle #7.    History of Present Illness    Gurpreet presents today for 11th cycle of chemotherapy.    He has gained about 11 pounds.  His biliary drain is out.  No further abdominal pain.  Slightly nauseated today and felt it was related to eating his breakfast too fast.  Diarrhea was much better after 2 weeks delay in chemotherapy because of the family  visiting him during the holiday season.  Overall he is doing really well.  He was very nervous about the next step of reimaging and surgery.    Pain Status  Currently in Pain: Yes    Review of Systems    Constitutional  Constitutional (WDL): Exceptions to WDL  Fatigue: Fatigue not relieved by rest - Limiting instrumental ADL  Fever: None  Chills: None  Weight Gain: 5 - <10% from baseline(7# 1/3/19)  Weight Loss: None  Neurosensory  Neurosensory (WDL): Exceptions to WDL  Peripheral Motor Neuropathy: Asymptomatic, clinical or diagnostic observations only, intervention not indicated  Ataxia: Asymptomatic, clinical or diagnostic observations only, intervention not indicated  Peripheral Sensory Neuropathy: Asymptomatic, loss of deep tendon reflexes or paresthesia(hands and feet, increased in feet)  Confusion: None  Syncope: None  Eye   Eye Disorder (WDL): All eye disorder elements are within defined limits  Ear  Ear Disorder (WDL): All ear disorder elements are within defined limits  Cardiovascular  Cardiovascular (WDL): Exceptions to WDL  Edema: No(intermitten, bilat LE)  Phlebitis: None  Superficial thrombophlebitis: None  Pulmonary  Respiratory (WDL): Within Defined Limits  Gastrointestinal  Gastrointestinal (WDL): Exceptions to WDL  Constipation: Occasional or intermittent symptoms, occasional use of stool softeners, laxatives, dietary modification, or enema(alternates between constipation and diarrhea)  Diarrhea: Increase of <4 stools per day over baseline, mild increase in ostomy output compared to baseline(alternates between constipation and diarrhea)  Dysphagia: None  Esophagitis: Asymptomatic, clinical or diagnostic observations only, intervention not indicated  Nausea: Loss of appetite without alteration in eating habits  Pharyngitis: None  Vomiting: None  Dysgeusia: None  Dry Mouth: Symptomatic (e.g., dry or thick saliva) without significant dietary alteration, unstimulated saliva flow >0.2  ml/min  Genitourinary  Genitourinary (WDL): All genitourinary elements are within defined limits  Lymphatic  Lymph (WDL): All lymph disorder elements are within defined limits  Musculoskeletal and Connective Tissue  Musculoskeletal and Connetive Tissue Disorders (WDL): All Musculoskeletal and Connetive Tissue Disorder elements are within defined limits  Integumentary  Integumentary (WDL): Exceptions to WDL  Alopecia: Hair loss of >50% normal for that individual that is readily apparent to others, a wig or hair piece is necessary if the patient desires to completely camouflage the hair loss, associated with psychosocial impact  Rash Maculo-Papular: None  Pruritus: None  Urticaria: None  Palmar-Plantar Erythrodysesthesia Syndrome: None  Flushing: None  Patient Coping  Patient Coping: Accepting  Distress Assessment  Distress Assessment Score: 5(due to upcoming Whipple surgery)  Accompanied by  Accompanied by: Alone  Oral Chemo Adherence       Past History  Past Medical History:   Diagnosis Date     Diabetes mellitus (H)      Hypercholesteremia      Hypertension      Sleep apnea        Physical Exam    Recent Vitals 1/23/2019   Weight 168 lbs 8 oz   BSA (m2) 1.94 m2   /64   Pulse 95   Temp 97.6   Temp src 1   SpO2 97   Some recent data might be hidden     General: alert, awake, not in acute distress  HEENT: Head: Normal, normocephalic, atraumatic.  Eye: Normal external eye, conjunctiva, lids cornea, DASHAWN.  Ears:  Non-tender.  Nose: Normal external nose, mucus membranes and septum.  Pharynx: Dental Hygiene adequate. Normal buccal mucosa. Normal pharynx.  Neck / Thyroid: Supple, no masses, nodes, nodules or enlargement.  Lymphatics: No abnormally enlarged lymph nodes.  Chest: Normal chest wall and respirations. Clear to auscultation.  Heart: S1 S2 RRR, no murmur.   Abdomen: abdomen is soft without significant tenderness, masses, organomegaly or guarding.  Midline ventral hernia noted.  No clinically evident of  ascites  Extremities: normal strength, tone, and muscle mass  Skin: normal. no rash or abnormalities  CNS: non focal.    Lab Results    Recent Results (from the past 168 hour(s))   Magnesium   Result Value Ref Range    Magnesium 1.9 1.8 - 2.6 mg/dL   Comprehensive Metabolic Panel   Result Value Ref Range    Sodium 142 136 - 145 mmol/L    Potassium 4.4 3.5 - 5.0 mmol/L    Chloride 108 (H) 98 - 107 mmol/L    CO2 25 22 - 31 mmol/L    Anion Gap, Calculation 9 5 - 18 mmol/L    Glucose 174 (H) 70 - 125 mg/dL    BUN 11 8 - 22 mg/dL    Creatinine 0.68 (L) 0.70 - 1.30 mg/dL    GFR MDRD Af Amer >60 >60 mL/min/1.73m2    GFR MDRD Non Af Amer >60 >60 mL/min/1.73m2    Bilirubin, Total 0.3 0.0 - 1.0 mg/dL    Calcium 8.8 8.5 - 10.5 mg/dL    Protein, Total 5.5 (L) 6.0 - 8.0 g/dL    Albumin 3.3 (L) 3.5 - 5.0 g/dL    Alkaline Phosphatase 99 45 - 120 U/L    AST 13 0 - 40 U/L    ALT 20 0 - 45 U/L   HM1 (CBC with Diff)   Result Value Ref Range    WBC 8.2 4.0 - 11.0 thou/uL    RBC 3.55 (L) 4.40 - 6.20 mill/uL    Hemoglobin 11.4 (L) 14.0 - 18.0 g/dL    Hematocrit 35.7 (L) 40.0 - 54.0 %     (H) 80 - 100 fL    MCH 32.1 27.0 - 34.0 pg    MCHC 31.9 (L) 32.0 - 36.0 g/dL    RDW 14.1 11.0 - 14.5 %    Platelets 166 140 - 440 thou/uL    MPV 9.0 8.5 - 12.5 fL    Neutrophils % 79 (H) 50 - 70 %    Lymphocytes % 9 (L) 20 - 40 %    Monocytes % 10 2 - 10 %    Eosinophils % 1 0 - 6 %    Basophils % 0 0 - 2 %    Neutrophils Absolute 6.4 2.0 - 7.7 thou/uL    Lymphocytes Absolute 0.8 0.8 - 4.4 thou/uL    Monocytes Absolute 0.9 0.0 - 0.9 thou/uL    Eosinophils Absolute 0.1 0.0 - 0.4 thou/uL    Basophils Absolute 0.0 0.0 - 0.2 thou/uL       Imaging    No results found.    TT: 40 minutes and more than 50% was spent on coordination and counseling of care.    Signed by: Xochitl Quick MD

## 2021-06-24 NOTE — PROGRESS NOTES
[facility] Hematology and Oncology Progress Note    Patient: Gurpreet Gu  MRN: [id number]  Date of Service: 2/14/2019      Reason for Visit    Chief Complaint   Patient presents with     HE Cancer     Pancreatic Cancer       Assessment and Plan  Cancer Staging  Malignant neoplasm of head of pancreas (H)  Staging form: Exocrine Pancreas, AJCC 8th Edition  - Clinical stage from 7/24/2018: Stage IB (cT2, cN0, cM0) - Signed by Xochitl Quick MD on 7/24/2018      ECOG Performance   ECOG Performance Status: 1     Distress Assessment  Distress Assessment Score: 5(anxious about test results today)    Pain  Currently in Pain: No/denies  Pain Score (Initial OR Reassessment): No/Denies Pain    #.  Borderline resectable pancreatic cancer (3.8 x 4.1 cm by CT) pancreatic head mass   Completed 12 cycles of neoadjuvant chemotherapy with FOLFIRINOX.  I reviewed the posttreatment CT scan and MRI liver.  He had significant reduction in size of pancreatic head mass during midcycle assessment in November 2018 and the current assessment showed ill-defined pancreatic head mass with has not increased from the comparison study from November 2018.  No new lymphadenopathy in the chest abdomen or pelvis.  MRI liver was completed for evaluation of 6 mm hypodense lesion in the right hepatic lobe evident by prior CT scan and it is currently 4 mm by MRI with no definite enhancements, or cyst suggesting nonspecific liver lesion.  CA-19-9 is pending today.     I explained to him that he completed and achieved adequate response from neoadjuvant chemotherapy and I referred him back to Dr. Magana to discuss about resectability at this point.     I advised him to keep the port until I follow-up with him after meeting with Dr. Magana or after surgery.    #.  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 Stable.   Expectant recovery although patient is made aware that there could be a persistent nerve damage.     #.  Obstructive jaundice, resolved      #.   Hypomagnesemia.  Normal today.   Continue oral magnesium oxide 400 mg twice daily.    #.  Diabetes type 2.   He is managing with oral hypoglycemic agent as well as insulin prn at this point.      Problem List    1. CA head of pancreas (H)  Ambulatory referral to General Surgery      ______________________________________________________________________________  Diagnosis  May 2018- new ill-defined hypodensity in the pancreatic head mass measuring about 3.5 cm by CT scan upon evaluation of left lower quadrant pain.  6/7/2018-FNA of the pancreatic head mass was positive for adenocarcinoma.  6/22/2018: PET/CT scan show ill-defined mass along the upper anterior margin of the pancreas adjacent to the duodenum with moderate FDG avidity, SUV max 4.7.  No additional sites of suspicious FDG avidity.    Treatment to date  7/24/2018 -1/23/2019: Completed neoadjuvant chemotherapy with FOLFIRINOX.   -Dose reduced in first cycle due to hyperbilirubinemia :5-FU to 50% (1400 mg/m  from 2800 mg/m ), irinotecan dose to 135 mg/m  from 180 mg/m .  Increased to full dose starting cycle #2 till C#6 of chemotherapy.    -Decreased irinotecan dose to 162 mg/m  from 180 mg/m  due to persistent diarrhea (watery about 12 times per day) throughout the cycle, and decreased oxaliplatin to 65 mg/m  due to persistent and worsening neuropathy in hands> feet and intolerable cold sensitivity starting cycle #7.  Further dose reduction of irinotecan to 150 mg/m  from cycle # 9-12.    History of Present Illness    Gurpreet presents today for follow-up after neoadjuvant chemotherapy.  He has gained about 5 pounds in the last 3 weeks.  He fell on ice a week ago.  He is still feeling a bit numb and somewhat painful in his feet but overall stable.  He is very nervous and worried about upcoming surgery.    Pain Status  Currently in Pain: No/denies    Review of Systems    Constitutional  Constitutional (WDL): Exceptions to WDL  Fatigue: Fatigue not relieved by  rest - Limiting instrumental ADL  Fever: None  Chills: None  Weight Gain: 5 - <10% from baseline(5# 1/23/19)  Weight Loss: None  Neurosensory  Neurosensory (WDL): Exceptions to WDL  Peripheral Motor Neuropathy: Moderate symptoms, limiting instrumental ADL(left dropped foot)  Ataxia: Moderate symptoms, limiting instrumental ADL  Peripheral Sensory Neuropathy: Asymptomatic, loss of deep tendon reflexes or paresthesia(fingers, bottom of feet, toes)  Confusion: None  Syncope: None  Eye   Eye Disorder (WDL): All eye disorder elements are within defined limits  Ear  Ear Disorder (WDL): All ear disorder elements are within defined limits  Cardiovascular  Cardiovascular (WDL): All cardiovascular elements are within defined limits  Pulmonary  Respiratory (WDL): Within Defined Limits  Gastrointestinal  Gastrointestinal (WDL): Exceptions to WDL  Anorexia: None  Constipation: Occasional or intermittent symptoms, occasional use of stool softeners, laxatives, dietary modification, or enema  Diarrhea: Increase of <4 stools per day over baseline, mild increase in ostomy output compared to baseline  Dysphagia: None  Esophagitis: Asymptomatic, clinical or diagnostic observations only, intervention not indicated  Nausea: Loss of appetite without alteration in eating habits  Pharyngitis: None  Vomiting: None  Dysgeusia: Altered taste but no change in diet  Dry Mouth: None  Genitourinary  Genitourinary (WDL): All genitourinary elements are within defined limits  Lymphatic  Lymph (WDL): All lymph disorder elements are within defined limits  Musculoskeletal and Connective Tissue  Musculoskeletal and Connetive Tissue Disorders (WDL): All Musculoskeletal and Connetive Tissue Disorder elements are within defined limits  Integumentary  Integumentary (WDL): Exceptions to WDL(bruise left hip, left abd-slipped down 4 stairs on ice.  Fell on butt. Didn't hit head. No LOC)  Alopecia: Hair loss of up to 50% of normal for that individual that is not  obvious from a distance but only on close inspection, a different hair style may be required to cover the hair loss but it does not require a wig or hair piece to camouflage  Rash Maculo-Papular: None  Pruritus: None  Urticaria: None  Palmar-Plantar Erythrodysesthesia Syndrome: None  Flushing: None  Patient Coping  Patient Coping: Accepting  Distress Assessment  Distress Assessment Score: 5(anxious about test results today)  Accompanied by  Accompanied by: Alone  Oral Chemo Adherence       Past History  Past Medical History:   Diagnosis Date     Diabetes mellitus (H)      Hypercholesteremia      Hypertension      Sleep apnea        Physical Exam    Recent Vitals 2/14/2019   Weight 172 lbs 6 oz   BSA (m2) 1.97 m2   /68   Pulse 88   Temp 97.4   Temp src 1   SpO2 99   Some recent data might be hidden     General: alert, awake, not in acute distress  HEENT: Head: Normal, normocephalic, atraumatic.  Eye: Normal external eye, conjunctiva, lids cornea, DASHAWN.  Ears:  Non-tender.  Nose: Normal external nose, mucus membranes and septum.  Pharynx: Dental Hygiene adequate. Normal buccal mucosa. Normal pharynx.  Neck / Thyroid: Supple, no masses, nodes, nodules or enlargement.  Lymphatics: No abnormally enlarged lymph nodes.  Chest: Normal chest wall and respirations. Clear to auscultation.  Heart: S1 S2 RRR, no murmur.   Abdomen: abdomen is soft without significant tenderness, masses, organomegaly or guarding.  Midline ventral hernia noted.  No clinically evident of ascites  Extremities: normal strength, tone, and muscle mass  Skin: normal. no rash or abnormalities.  Thinning of head hair.  CNS: non focal.    Lab Results    Recent Results (from the past 168 hour(s))   Comprehensive Metabolic Panel   Result Value Ref Range    Sodium 141 136 - 145 mmol/L    Potassium 4.1 3.5 - 5.0 mmol/L    Chloride 105 98 - 107 mmol/L    CO2 29 22 - 31 mmol/L    Anion Gap, Calculation 7 5 - 18 mmol/L    Glucose 96 70 - 125 mg/dL    BUN 14  8 - 22 mg/dL    Creatinine 0.63 (L) 0.70 - 1.30 mg/dL    GFR MDRD Af Amer >60 >60 mL/min/1.73m2    GFR MDRD Non Af Amer >60 >60 mL/min/1.73m2    Bilirubin, Total 0.2 0.0 - 1.0 mg/dL    Calcium 8.9 8.5 - 10.5 mg/dL    Protein, Total 5.9 (L) 6.0 - 8.0 g/dL    Albumin 3.4 (L) 3.5 - 5.0 g/dL    Alkaline Phosphatase 103 45 - 120 U/L    AST 20 0 - 40 U/L    ALT 36 0 - 45 U/L   HM1 (CBC with Diff)   Result Value Ref Range    WBC 7.9 4.0 - 11.0 thou/uL    RBC 3.59 (L) 4.40 - 6.20 mill/uL    Hemoglobin 11.3 (L) 14.0 - 18.0 g/dL    Hematocrit 34.8 (L) 40.0 - 54.0 %    MCV 97 80 - 100 fL    MCH 31.5 27.0 - 34.0 pg    MCHC 32.5 32.0 - 36.0 g/dL    RDW 14.2 11.0 - 14.5 %    Platelets 168 140 - 440 thou/uL    MPV 9.0 8.5 - 12.5 fL    Neutrophils % 67 50 - 70 %    Lymphocytes % 16 (L) 20 - 40 %    Monocytes % 14 (H) 2 - 10 %    Eosinophils % 3 0 - 6 %    Basophils % 1 0 - 2 %    Neutrophils Absolute 5.3 2.0 - 7.7 thou/uL    Lymphocytes Absolute 1.3 0.8 - 4.4 thou/uL    Monocytes Absolute 1.1 (H) 0.0 - 0.9 thou/uL    Eosinophils Absolute 0.2 0.0 - 0.4 thou/uL    Basophils Absolute 0.0 0.0 - 0.2 thou/uL       Imaging    Ct Chest Abdomen Pelvis With Oral With Iv Cont    Result Date: 2/12/2019  [facility] RADIOLOGY EXAM: CT CHEST ABDOMEN PELVIS W ORAL W IV CONTRAST LOCATION: [facility] DATE/TIME: 2/12/2019 3:04 PM INDICATION: Pancreatic cancer, restaging. COMPARISON: 11/8/2018 TECHNIQUE: Helical thin-section CT scan of the chest, abdomen, and pelvis was performed before and after injection of IV contrast. Multiplanar reformats were obtained. Dose reduction techniques were used. CONTRAST: Iohexol (Omni) 100 mL FINDINGS: CHEST: No new pulmonary nodules. No new thoracic lymphadenopathy. The main pulmonary artery is mildly enlarged at 33 mm in diameter.  ABDOMEN: Again seen is a metallic biliary stent. There is mild intrahepatic bile duct dilatation which is not changed. A percutaneous biliary drain is been removed. Pneumobilia is  noted. The previously described hypoattenuating lesion in the medial right  hepatic lobe adjacent to the posterior right portal vein is not appreciated on this study and would be expected on series 2 image 107. Normal spleen. The pancreas is atrophic. No pancreatic duct dilatation. There is ill-defined soft tissue at the pancreatic head and neck which has not increased from the comparison study. Normal kidneys and ureters. No new abdominal lymphadenopathy. There is mild wall thickening of the stomach. A gastrojejunal anastomosis is noted. There is mild wall thickening of  the proximal small bowel. PELVIS: Normal urinary bladder. Normal size of the prostate gland. Seminal vesicles are symmetric. There is colonic diverticulosis. No acute diverticulitis. No appendicitis. MUSCULOSKELETAL: Negative.     CONCLUSION: 1.  The pancreatic head mass is ill-defined and has not increased in size from the comparison study. A small lesion in the medial right hepatic lobe is not appreciated on this study. See the dedicated MRI report for additional information. 2.  Suspect gastritis and enteritis. 3.  No new lymphadenopathy in the chest, abdomen, or pelvis. No new pulmonary nodules.     Mr Liver With Without Contrast    Result Date: 2/12/2019  MR LIVER W WO CONTRAST 2/12/2019 1:23 PM INDICATION: 6 mm lesion in the right hepatic lobe, pancreatic cancer, pre surgical evaluation TECHNIQUE: Routine MRI liver protocol with multiple axial and coronal T1 and T2 sequences. Exam without and with IV contrast. IV CONTRAST: Gadavist 7.5 COMPARISON: 11/8/2018 FINDINGS: The liver is normal in size. No hepatic steatosis. The tiny posterior lower right hepatic lobe lesion is only seen on a few sequences and measures approximately 4 mm (series 10 image 56, for example). This previously measured approximately 9 mm on CT. There is no evidence of enhancement. No other hepatic lesions identified. Normal spleen, adrenal glands, and gallbladder. A bile  duct stent is present. There is mild intrahepatic bile duct dilatation. The stent appears patent. No pancreatic duct dilatation identified. The pancreatic mass is not well assessed on this study. No hydronephrosis. There is colonic diverticulosis.     CONCLUSION: 1.  A tiny 4 mm right hepatic lobe lesion appears slightly smaller than on the comparison study and is nonspecific. No definite enhancement. 2.  The pancreatic mass is not well assessed on this study. 3.  The CT study will be dictated separately.    TT: 40 minutes and more than 50% was spent on coordination and counseling of care.    Signed by: Xochitl Quick MD

## 2021-06-24 NOTE — TELEPHONE ENCOUNTER
"Message received from patient:    \"Please order me   1. Hydromorphone     Thank you for everything that you have done for me.   Gurpreet Gu \"    Last refilled by LINA Eid CNP on 2/1/19.  Quantity #60. No refills.    Message sent to LINA Eid CNP/SERENITY Bang RN  "

## 2024-02-10 NOTE — PROGRESS NOTES
Interval History: no new complaints.    Review of Systems   HENT:  Negative for ear discharge and ear pain.    Eyes:  Negative for discharge and itching.   Endocrine: Negative for cold intolerance and heat intolerance.   Neurological:  Negative for seizures and syncope.     Objective:     Vital Signs (Most Recent):  Temp: 98.6 °F (37 °C) (02/10/24 1130)  Pulse: 64 (02/10/24 1130)  Resp: 16 (02/10/24 1130)  BP: 127/60 (02/10/24 1130)  SpO2: (!) 94 % (02/10/24 1130) Vital Signs (24h Range):  Temp:  [97.7 °F (36.5 °C)-98.9 °F (37.2 °C)] 98.6 °F (37 °C)  Pulse:  [53-66] 64  Resp:  [16-19] 16  SpO2:  [93 %-98 %] 94 %  BP: (120-139)/(56-64) 127/60     Weight: 75.3 kg (166 lb)  Body mass index is 26 kg/m².    Intake/Output Summary (Last 24 hours) at 2/10/2024 1218  Last data filed at 2/10/2024 1040  Gross per 24 hour   Intake 510.83 ml   Output --   Net 510.83 ml           Physical Exam  Vitals and nursing note reviewed.   Constitutional:       General: He is not in acute distress.     Appearance: He is ill-appearing. He is not toxic-appearing.   HENT:      Head: Normocephalic and atraumatic.   Neck:      Comments: R chest wall THDC in place  Cardiovascular:      Rate and Rhythm: Normal rate and regular rhythm.   Pulmonary:      Effort: Pulmonary effort is normal.      Breath sounds: Normal breath sounds.   Abdominal:      General: Bowel sounds are normal. There is no distension.      Tenderness: There is no abdominal tenderness. There is no guarding or rebound.      Hernia: A hernia (umbilical, reducible) is present.   Musculoskeletal:      Right lower leg: No edema.      Left lower leg: No edema.   Skin:     General: Skin is warm and dry.   Neurological:      Mental Status: He is alert.             Significant Labs: All pertinent labs within the past 24 hours have been reviewed.  BMP:   Recent Labs   Lab 02/09/24  0843   *      K 4.5      CO2 27   BUN 33*   CREATININE 5.5*   CALCIUM 8.8       CBC:  Pt presented to clinic for provider visit, labs and possible chemo for pancreatic cancer. Marjorie Mcginnis RN     Recent Labs   Lab 02/09/24  0843   WBC 15.47*   HGB 7.3*   HCT 24.0*            Significant Imaging: I have reviewed all pertinent imaging results/findings within the past 24 hours.